# Patient Record
Sex: MALE | Race: WHITE | Employment: OTHER | URBAN - METROPOLITAN AREA
[De-identification: names, ages, dates, MRNs, and addresses within clinical notes are randomized per-mention and may not be internally consistent; named-entity substitution may affect disease eponyms.]

---

## 2022-08-27 ENCOUNTER — HOSPITAL ENCOUNTER (INPATIENT)
Age: 67
LOS: 4 days | Discharge: HOME | DRG: 180 | End: 2022-08-31
Attending: INTERNAL MEDICINE | Admitting: INTERNAL MEDICINE
Payer: MEDICARE

## 2022-08-27 ENCOUNTER — HOSPITAL ENCOUNTER (EMERGENCY)
Age: 67
Discharge: ANOTHER HOSPITAL | End: 2022-08-27
Attending: EMERGENCY MEDICINE
Payer: MEDICARE

## 2022-08-27 ENCOUNTER — APPOINTMENT (OUTPATIENT)
Dept: GENERAL RADIOLOGY | Age: 67
End: 2022-08-27
Payer: MEDICARE

## 2022-08-27 ENCOUNTER — APPOINTMENT (OUTPATIENT)
Dept: CT IMAGING | Age: 67
End: 2022-08-27
Payer: MEDICARE

## 2022-08-27 VITALS
WEIGHT: 230 LBS | HEART RATE: 92 BPM | BODY MASS INDEX: 30.48 KG/M2 | DIASTOLIC BLOOD PRESSURE: 93 MMHG | HEIGHT: 73 IN | RESPIRATION RATE: 32 BRPM | TEMPERATURE: 99.2 F | OXYGEN SATURATION: 95 % | SYSTOLIC BLOOD PRESSURE: 151 MMHG

## 2022-08-27 DIAGNOSIS — I71.43 ANEURYSM OF INFRARENAL ABDOMINAL AORTA: ICD-10-CM

## 2022-08-27 DIAGNOSIS — J90 LOCULATED PLEURAL EFFUSION: Primary | ICD-10-CM

## 2022-08-27 DIAGNOSIS — R07.81 PLEURITIC CHEST PAIN: ICD-10-CM

## 2022-08-27 DIAGNOSIS — J98.11 ATELECTASIS: ICD-10-CM

## 2022-08-27 DIAGNOSIS — J18.9 PNEUMONIA OF LEFT LOWER LOBE DUE TO INFECTIOUS ORGANISM: ICD-10-CM

## 2022-08-27 LAB
ALBUMIN SERPL-MCNC: 3.2 G/DL (ref 3.5–5.2)
ALP BLD-CCNC: 64 U/L (ref 40–129)
ALT SERPL-CCNC: 16 U/L (ref 0–40)
ANION GAP SERPL CALCULATED.3IONS-SCNC: 13 MMOL/L (ref 7–16)
APTT: 37.8 SEC (ref 24.5–35.1)
AST SERPL-CCNC: 18 U/L (ref 0–39)
BASOPHILS ABSOLUTE: 0.06 E9/L (ref 0–0.2)
BASOPHILS RELATIVE PERCENT: 0.4 % (ref 0–2)
BILIRUB SERPL-MCNC: 0.5 MG/DL (ref 0–1.2)
BUN BLDV-MCNC: 8 MG/DL (ref 6–23)
CALCIUM SERPL-MCNC: 9.3 MG/DL (ref 8.6–10.2)
CHLORIDE BLD-SCNC: 97 MMOL/L (ref 98–107)
CO2: 23 MMOL/L (ref 22–29)
CREAT SERPL-MCNC: 0.7 MG/DL (ref 0.7–1.2)
EKG ATRIAL RATE: 100 BPM
EKG P AXIS: 64 DEGREES
EKG P-R INTERVAL: 140 MS
EKG Q-T INTERVAL: 318 MS
EKG QRS DURATION: 82 MS
EKG QTC CALCULATION (BAZETT): 410 MS
EKG R AXIS: -21 DEGREES
EKG T AXIS: 76 DEGREES
EKG VENTRICULAR RATE: 100 BPM
EOSINOPHILS ABSOLUTE: 0.04 E9/L (ref 0.05–0.5)
EOSINOPHILS RELATIVE PERCENT: 0.3 % (ref 0–6)
GFR AFRICAN AMERICAN: >60
GFR NON-AFRICAN AMERICAN: >60 ML/MIN/1.73
GLUCOSE BLD-MCNC: 117 MG/DL (ref 74–99)
HCT VFR BLD CALC: 42.5 % (ref 37–54)
HEMOGLOBIN: 14.7 G/DL (ref 12.5–16.5)
IMMATURE GRANULOCYTES #: 0.11 E9/L
IMMATURE GRANULOCYTES %: 0.8 % (ref 0–5)
INR BLD: 1.2
LACTIC ACID, SEPSIS: 1.4 MMOL/L (ref 0.5–1.9)
LYMPHOCYTES ABSOLUTE: 1.32 E9/L (ref 1.5–4)
LYMPHOCYTES RELATIVE PERCENT: 9.7 % (ref 20–42)
MCH RBC QN AUTO: 30.2 PG (ref 26–35)
MCHC RBC AUTO-ENTMCNC: 34.6 % (ref 32–34.5)
MCV RBC AUTO: 87.4 FL (ref 80–99.9)
MONOCYTES ABSOLUTE: 1.22 E9/L (ref 0.1–0.95)
MONOCYTES RELATIVE PERCENT: 9 % (ref 2–12)
NEUTROPHILS ABSOLUTE: 10.85 E9/L (ref 1.8–7.3)
NEUTROPHILS RELATIVE PERCENT: 79.8 % (ref 43–80)
PDW BLD-RTO: 14.6 FL (ref 11.5–15)
PLATELET # BLD: 417 E9/L (ref 130–450)
PMV BLD AUTO: 9.5 FL (ref 7–12)
POTASSIUM REFLEX MAGNESIUM: 4 MMOL/L (ref 3.5–5)
PRO-BNP: 313 PG/ML (ref 0–125)
PROTHROMBIN TIME: 13.5 SEC (ref 9.3–12.4)
RBC # BLD: 4.86 E12/L (ref 3.8–5.8)
SARS-COV-2, NAAT: NOT DETECTED
SODIUM BLD-SCNC: 133 MMOL/L (ref 132–146)
TOTAL PROTEIN: 8 G/DL (ref 6.4–8.3)
TROPONIN, HIGH SENSITIVITY: 12 NG/L (ref 0–11)
TROPONIN, HIGH SENSITIVITY: 12 NG/L (ref 0–11)
WBC # BLD: 13.6 E9/L (ref 4.5–11.5)

## 2022-08-27 PROCEDURE — 84484 ASSAY OF TROPONIN QUANT: CPT

## 2022-08-27 PROCEDURE — 2580000003 HC RX 258: Performed by: STUDENT IN AN ORGANIZED HEALTH CARE EDUCATION/TRAINING PROGRAM

## 2022-08-27 PROCEDURE — 80053 COMPREHEN METABOLIC PANEL: CPT

## 2022-08-27 PROCEDURE — 85730 THROMBOPLASTIN TIME PARTIAL: CPT

## 2022-08-27 PROCEDURE — 96365 THER/PROPH/DIAG IV INF INIT: CPT

## 2022-08-27 PROCEDURE — 6360000002 HC RX W HCPCS: Performed by: FAMILY MEDICINE

## 2022-08-27 PROCEDURE — 96375 TX/PRO/DX INJ NEW DRUG ADDON: CPT

## 2022-08-27 PROCEDURE — 83605 ASSAY OF LACTIC ACID: CPT

## 2022-08-27 PROCEDURE — 2140000000 HC CCU INTERMEDIATE R&B

## 2022-08-27 PROCEDURE — 6360000002 HC RX W HCPCS: Performed by: EMERGENCY MEDICINE

## 2022-08-27 PROCEDURE — 6360000002 HC RX W HCPCS: Performed by: STUDENT IN AN ORGANIZED HEALTH CARE EDUCATION/TRAINING PROGRAM

## 2022-08-27 PROCEDURE — 6370000000 HC RX 637 (ALT 250 FOR IP): Performed by: FAMILY MEDICINE

## 2022-08-27 PROCEDURE — 36415 COLL VENOUS BLD VENIPUNCTURE: CPT

## 2022-08-27 PROCEDURE — 87635 SARS-COV-2 COVID-19 AMP PRB: CPT

## 2022-08-27 PROCEDURE — 2580000003 HC RX 258: Performed by: FAMILY MEDICINE

## 2022-08-27 PROCEDURE — 87040 BLOOD CULTURE FOR BACTERIA: CPT

## 2022-08-27 PROCEDURE — 96366 THER/PROPH/DIAG IV INF ADDON: CPT

## 2022-08-27 PROCEDURE — 2500000003 HC RX 250 WO HCPCS: Performed by: FAMILY MEDICINE

## 2022-08-27 PROCEDURE — 93005 ELECTROCARDIOGRAM TRACING: CPT | Performed by: STUDENT IN AN ORGANIZED HEALTH CARE EDUCATION/TRAINING PROGRAM

## 2022-08-27 PROCEDURE — 85025 COMPLETE CBC W/AUTO DIFF WBC: CPT

## 2022-08-27 PROCEDURE — 71045 X-RAY EXAM CHEST 1 VIEW: CPT

## 2022-08-27 PROCEDURE — 71275 CT ANGIOGRAPHY CHEST: CPT

## 2022-08-27 PROCEDURE — 96376 TX/PRO/DX INJ SAME DRUG ADON: CPT

## 2022-08-27 PROCEDURE — 96374 THER/PROPH/DIAG INJ IV PUSH: CPT

## 2022-08-27 PROCEDURE — 2580000003 HC RX 258: Performed by: EMERGENCY MEDICINE

## 2022-08-27 PROCEDURE — 99285 EMERGENCY DEPT VISIT HI MDM: CPT

## 2022-08-27 PROCEDURE — 6360000004 HC RX CONTRAST MEDICATION: Performed by: RADIOLOGY

## 2022-08-27 PROCEDURE — 74174 CTA ABD&PLVS W/CONTRAST: CPT

## 2022-08-27 PROCEDURE — 85610 PROTHROMBIN TIME: CPT

## 2022-08-27 PROCEDURE — 83880 ASSAY OF NATRIURETIC PEPTIDE: CPT

## 2022-08-27 PROCEDURE — 6370000000 HC RX 637 (ALT 250 FOR IP): Performed by: STUDENT IN AN ORGANIZED HEALTH CARE EDUCATION/TRAINING PROGRAM

## 2022-08-27 RX ORDER — ENOXAPARIN SODIUM 100 MG/ML
30 INJECTION SUBCUTANEOUS 2 TIMES DAILY
Status: DISCONTINUED | OUTPATIENT
Start: 2022-08-28 | End: 2022-08-31 | Stop reason: HOSPADM

## 2022-08-27 RX ORDER — NITROGLYCERIN 0.4 MG/1
0.4 TABLET SUBLINGUAL ONCE
Status: COMPLETED | OUTPATIENT
Start: 2022-08-27 | End: 2022-08-27

## 2022-08-27 RX ORDER — SODIUM CHLORIDE 9 MG/ML
INJECTION, SOLUTION INTRAVENOUS PRN
Status: DISCONTINUED | OUTPATIENT
Start: 2022-08-27 | End: 2022-08-31 | Stop reason: HOSPADM

## 2022-08-27 RX ORDER — SODIUM CHLORIDE 0.9 % (FLUSH) 0.9 %
10 SYRINGE (ML) INJECTION EVERY 12 HOURS SCHEDULED
Status: DISCONTINUED | OUTPATIENT
Start: 2022-08-27 | End: 2022-08-31 | Stop reason: HOSPADM

## 2022-08-27 RX ORDER — SODIUM CHLORIDE 9 MG/ML
INJECTION, SOLUTION INTRAVENOUS ONCE
Status: COMPLETED | OUTPATIENT
Start: 2022-08-27 | End: 2022-08-27

## 2022-08-27 RX ORDER — FENTANYL CITRATE 0.05 MG/ML
100 INJECTION, SOLUTION INTRAMUSCULAR; INTRAVENOUS ONCE
Status: COMPLETED | OUTPATIENT
Start: 2022-08-27 | End: 2022-08-27

## 2022-08-27 RX ORDER — FENTANYL CITRATE 50 UG/ML
50 INJECTION, SOLUTION INTRAMUSCULAR; INTRAVENOUS
Status: DISCONTINUED | OUTPATIENT
Start: 2022-08-27 | End: 2022-08-31 | Stop reason: HOSPADM

## 2022-08-27 RX ORDER — ASPIRIN 81 MG/1
324 TABLET, CHEWABLE ORAL ONCE
Status: COMPLETED | OUTPATIENT
Start: 2022-08-27 | End: 2022-08-27

## 2022-08-27 RX ORDER — AMLODIPINE BESYLATE 5 MG/1
5 TABLET ORAL DAILY
Status: DISCONTINUED | OUTPATIENT
Start: 2022-08-28 | End: 2022-08-31 | Stop reason: HOSPADM

## 2022-08-27 RX ORDER — ACETAMINOPHEN 325 MG/1
650 TABLET ORAL EVERY 6 HOURS PRN
Status: DISCONTINUED | OUTPATIENT
Start: 2022-08-27 | End: 2022-08-31 | Stop reason: HOSPADM

## 2022-08-27 RX ORDER — PROMETHAZINE HYDROCHLORIDE 12.5 MG/1
12.5 TABLET ORAL EVERY 6 HOURS PRN
Status: DISCONTINUED | OUTPATIENT
Start: 2022-08-27 | End: 2022-08-31 | Stop reason: HOSPADM

## 2022-08-27 RX ORDER — POLYETHYLENE GLYCOL 3350 17 G/17G
17 POWDER, FOR SOLUTION ORAL DAILY PRN
Status: DISCONTINUED | OUTPATIENT
Start: 2022-08-27 | End: 2022-08-31 | Stop reason: HOSPADM

## 2022-08-27 RX ORDER — ACETAMINOPHEN 650 MG/1
650 SUPPOSITORY RECTAL EVERY 6 HOURS PRN
Status: DISCONTINUED | OUTPATIENT
Start: 2022-08-27 | End: 2022-08-31 | Stop reason: HOSPADM

## 2022-08-27 RX ORDER — ATORVASTATIN CALCIUM 20 MG/1
20 TABLET, FILM COATED ORAL DAILY
Status: DISCONTINUED | OUTPATIENT
Start: 2022-08-28 | End: 2022-08-29

## 2022-08-27 RX ORDER — ONDANSETRON 2 MG/ML
4 INJECTION INTRAMUSCULAR; INTRAVENOUS EVERY 6 HOURS PRN
Status: DISCONTINUED | OUTPATIENT
Start: 2022-08-27 | End: 2022-08-31 | Stop reason: HOSPADM

## 2022-08-27 RX ORDER — SODIUM CHLORIDE 0.9 % (FLUSH) 0.9 %
10 SYRINGE (ML) INJECTION PRN
Status: DISCONTINUED | OUTPATIENT
Start: 2022-08-27 | End: 2022-08-31 | Stop reason: HOSPADM

## 2022-08-27 RX ORDER — OXYCODONE HYDROCHLORIDE AND ACETAMINOPHEN 5; 325 MG/1; MG/1
1 TABLET ORAL EVERY 6 HOURS PRN
Status: DISCONTINUED | OUTPATIENT
Start: 2022-08-27 | End: 2022-08-31 | Stop reason: HOSPADM

## 2022-08-27 RX ORDER — FENTANYL CITRATE 0.05 MG/ML
100 INJECTION, SOLUTION INTRAMUSCULAR; INTRAVENOUS
Status: DISCONTINUED | OUTPATIENT
Start: 2022-08-27 | End: 2022-08-27 | Stop reason: HOSPADM

## 2022-08-27 RX ORDER — FENTANYL CITRATE 0.05 MG/ML
50 INJECTION, SOLUTION INTRAMUSCULAR; INTRAVENOUS ONCE
Status: COMPLETED | OUTPATIENT
Start: 2022-08-27 | End: 2022-08-27

## 2022-08-27 RX ADMIN — FENTANYL CITRATE 100 MCG: 50 INJECTION INTRAMUSCULAR; INTRAVENOUS at 16:48

## 2022-08-27 RX ADMIN — FENTANYL CITRATE 100 MCG: 50 INJECTION INTRAMUSCULAR; INTRAVENOUS at 14:02

## 2022-08-27 RX ADMIN — FENTANYL CITRATE 50 MCG: 0.05 INJECTION, SOLUTION INTRAMUSCULAR; INTRAVENOUS at 21:39

## 2022-08-27 RX ADMIN — Medication 10 ML: at 21:41

## 2022-08-27 RX ADMIN — CEFTRIAXONE SODIUM 1000 MG: 1 INJECTION, POWDER, FOR SOLUTION INTRAMUSCULAR; INTRAVENOUS at 11:30

## 2022-08-27 RX ADMIN — AZITHROMYCIN MONOHYDRATE 500 MG: 500 INJECTION, POWDER, LYOPHILIZED, FOR SOLUTION INTRAVENOUS at 11:29

## 2022-08-27 RX ADMIN — FENTANYL CITRATE 100 MCG: 50 INJECTION INTRAMUSCULAR; INTRAVENOUS at 19:01

## 2022-08-27 RX ADMIN — OXYCODONE AND ACETAMINOPHEN 1 TABLET: 5; 325 TABLET ORAL at 22:37

## 2022-08-27 RX ADMIN — FENTANYL CITRATE 100 MCG: 50 INJECTION INTRAMUSCULAR; INTRAVENOUS at 17:59

## 2022-08-27 RX ADMIN — FENTANYL CITRATE 50 MCG: 50 INJECTION INTRAMUSCULAR; INTRAVENOUS at 08:40

## 2022-08-27 RX ADMIN — IOPAMIDOL 75 ML: 755 INJECTION, SOLUTION INTRAVENOUS at 10:04

## 2022-08-27 RX ADMIN — ASPIRIN 81 MG CHEWABLE TABLET 324 MG: 81 TABLET CHEWABLE at 08:22

## 2022-08-27 RX ADMIN — FENTANYL CITRATE 100 MCG: 50 INJECTION INTRAMUSCULAR; INTRAVENOUS at 19:51

## 2022-08-27 RX ADMIN — NITROGLYCERIN 0.4 MG: 0.4 TABLET SUBLINGUAL at 08:20

## 2022-08-27 RX ADMIN — SODIUM CHLORIDE: 9 INJECTION, SOLUTION INTRAVENOUS at 11:54

## 2022-08-27 RX ADMIN — FENTANYL CITRATE 100 MCG: 50 INJECTION INTRAMUSCULAR; INTRAVENOUS at 11:17

## 2022-08-27 RX ADMIN — DOXYCYCLINE 100 MG: 100 INJECTION, POWDER, LYOPHILIZED, FOR SOLUTION INTRAVENOUS at 21:41

## 2022-08-27 ASSESSMENT — PAIN SCALES - GENERAL
PAINLEVEL_OUTOF10: 8
PAINLEVEL_OUTOF10: 8
PAINLEVEL_OUTOF10: 9
PAINLEVEL_OUTOF10: 8
PAINLEVEL_OUTOF10: 8
PAINLEVEL_OUTOF10: 9
PAINLEVEL_OUTOF10: 10
PAINLEVEL_OUTOF10: 8
PAINLEVEL_OUTOF10: 8
PAINLEVEL_OUTOF10: 9
PAINLEVEL_OUTOF10: 9
PAINLEVEL_OUTOF10: 7
PAINLEVEL_OUTOF10: 10
PAINLEVEL_OUTOF10: 7
PAINLEVEL_OUTOF10: 10

## 2022-08-27 ASSESSMENT — PAIN DESCRIPTION - ONSET
ONSET: ON-GOING

## 2022-08-27 ASSESSMENT — PAIN DESCRIPTION - LOCATION
LOCATION: CHEST

## 2022-08-27 ASSESSMENT — PAIN DESCRIPTION - FREQUENCY
FREQUENCY: CONTINUOUS

## 2022-08-27 ASSESSMENT — ENCOUNTER SYMPTOMS
SHORTNESS OF BREATH: 0
ABDOMINAL PAIN: 0
SORE THROAT: 0
BACK PAIN: 0
VOMITING: 0
NAUSEA: 0
RHINORRHEA: 0
COUGH: 1
DIARRHEA: 0

## 2022-08-27 ASSESSMENT — PAIN DESCRIPTION - DESCRIPTORS
DESCRIPTORS: SHARP
DESCRIPTORS: SHARP
DESCRIPTORS: SHARP;ACHING;DISCOMFORT

## 2022-08-27 ASSESSMENT — PAIN DESCRIPTION - ORIENTATION
ORIENTATION: LEFT;ANTERIOR;MID
ORIENTATION: LEFT;MID;ANTERIOR
ORIENTATION: LEFT;ANTERIOR

## 2022-08-27 ASSESSMENT — PAIN DESCRIPTION - PAIN TYPE
TYPE: ACUTE PAIN

## 2022-08-27 ASSESSMENT — PAIN - FUNCTIONAL ASSESSMENT
PAIN_FUNCTIONAL_ASSESSMENT: 0-10
PAIN_FUNCTIONAL_ASSESSMENT: 0-10

## 2022-08-27 NOTE — ED PROVIDER NOTES
[address] [facility]   ED Provider Note  Department of Emergency Medicine     ED Room:       Written by: Ralph Horner DO  Patient Name: Jeronimo Uriarte  Attending Provider: Mateusz Montejo DO  Admit Date: 2022  7:35 AM  MRN: [id number]    : 1955        Chief Complaint   Patient presents with    Chest Pain     Chest pressure that started 2 days ago and today the pain is not going away. - Chief complaint    HPI   Jeronimo Uriarte is a 74 y.o. male presenting to the ED for evaluation of Chest Pain (Chest pressure that started 2 days ago and today the pain is not going away.)      History obtained from patient. Patient has a past medical history of HTN, HLD, history of intracranial hemorrhage in 2014; takes amlodipine. Patient is presenting for evaluation of chest pain that has been occurring episodically for the past 3 days but since this morning has been persistent; patient describes pain as middle and left side of his chest, feels like someone \"hit me with a sledgehammer\", worse when he breathes in deep. Symptoms are not affected by exertion or changes in position. Patient states he did take ibuprofen a few times over the past 2 days with improved symptoms but today he has not had any yet. States it is the most severe it has been today. Pain is not radiating. Denies any back pain. Denies any recent long travel; denies any lower extremity edema or tenderness, denies any history of DVT or PE. Patient does not take any blood thinning medications. Denies any history of ACS. Complaints are severe in severity and persistent. Does endorse cough, chronic but now coughing up green mucous. Denies SOB at rest but does report difficulty taking a deep breath due to pain. Review of Systems   Constitutional:  Negative for chills and fever. HENT:  Negative for rhinorrhea and sore throat. Eyes:  Negative for visual disturbance. Respiratory:  Positive for cough.  Negative for shortness of breath. Cardiovascular:  Positive for chest pain. Negative for palpitations and leg swelling. Gastrointestinal:  Negative for abdominal pain, diarrhea, nausea and vomiting. Genitourinary:  Negative for dysuria and frequency. Musculoskeletal:  Negative for back pain and myalgias. Skin:  Negative for rash and wound. Neurological:  Negative for weakness, numbness and headaches. Psychiatric/Behavioral:  Negative for confusion. All other systems reviewed and are negative. Physical Exam  Vitals and nursing note reviewed. Constitutional:       General: He is not in acute distress. Appearance: He is ill-appearing. He is not toxic-appearing. HENT:      Head: Normocephalic and atraumatic. Right Ear: External ear normal.      Left Ear: External ear normal.      Nose: Nose normal. No rhinorrhea. Mouth/Throat:      Mouth: Mucous membranes are moist.      Pharynx: Oropharynx is clear. Eyes:      Extraocular Movements: Extraocular movements intact. Conjunctiva/sclera: Conjunctivae normal.      Pupils: Pupils are equal, round, and reactive to light. Cardiovascular:      Rate and Rhythm: Normal rate and regular rhythm. Pulses: Normal pulses. Heart sounds: Normal heart sounds. Pulmonary:      Effort: Pulmonary effort is normal. No respiratory distress. Breath sounds: Rhonchi (b/l) present. No wheezing or rales. Comments: Intermittent cough    Abdominal:      General: Bowel sounds are normal.      Palpations: Abdomen is soft. Tenderness: There is no abdominal tenderness. There is no right CVA tenderness, left CVA tenderness, guarding or rebound. Musculoskeletal:         General: No tenderness. Normal range of motion. Cervical back: Normal range of motion and neck supple. Right lower leg: No edema. Left lower leg: No edema. Skin:     General: Skin is warm and dry. Capillary Refill: Capillary refill takes less than 2 seconds. Coloration: Skin is not jaundiced or pale. Findings: No rash. Neurological:      General: No focal deficit present. Mental Status: He is alert and oriented to person, place, and time. Sensory: No sensory deficit. Motor: No weakness. Gait: Gait normal.   Psychiatric:         Mood and Affect: Mood normal.         Behavior: Behavior normal.        Procedures       [facility]       ED Course as of 08/27/22 1634   Sat Aug 27, 2022   0838 Sublingual nitro did not change symptoms at all. Patient still having significant pain  50 mcg IV fentanyl ordered [VG]   0928 Pt having improved pain with certain positions, he leans forward which then gives him relief when sitting back up.   [SO]   1124 Given patient's incidentally identified 5.5 cm abdominal aortic aneurysm, and that he requires admission for evaluation with pulmonology and likely bronchoscopy, we will initiate transfer to [facility] given the availability of vascular surgery at that facility as well. [VG]   1131 Spoke with Dr. Neville Jaquez at [facility] via access center, discussed case, patient is accepted for transfer and admission at [facility]. [VG]      ED Course User Index  [SO] Francine Francois,   [VG] Giana Celestin DO         Medical Decision Making: This is a 74 y.o. male presenting for evaluation of chest pain x3 days, on the left, worse when he breathes in. Alert and oriented on arrival, ill-appearing and uncomfortable due to chest pain. EKG did not show acute ischemic changes. Patient given 1 dose of sublingual nitro without improvement. Subsequently given 1 dose of IV fentanyl with significant improvement. Ultimately, it was found that patient had left-sided infiltrate and effusion; we obtained a CTA which did show large pleural effusion on the left with some infiltrate as well. He was treated with azithromycin and Rocephin. Patient symptoms improved throughout this encounter.   Given that he was reporting initiate transfer to [facility] given the availability of vascular surgery at that facility as well. [VG]   [phone] with Dr. Slim Brownlee at [facility] via access center, discussed case, patient is accepted for transfer and admission at [facility]. [VG]      ED Course User Index  [SO] Alonso Martines DO  [VG] Bryan Crane,        --------------------------------------------- PAST HISTORY ---------------------------------------------  Past Medical History:  has a past medical history of Hepatitis C, Hyperlipidemia, Hypertension, and Intracranial hemorrhage ([facility] [address] Past Surgical History:  has a past surgical history that includes back surgery. Social History:  reports that he has been smoking cigarettes. He has a 22.50 pack-year smoking history. He has never used smokeless tobacco. He reports current alcohol use of about 8.3 standard drinks per week. He reports that he does not use drugs. Family History: family history is not on file. Unless otherwise noted, family history is non contributory. The patients home medications have been reviewed. Allergies: Patient has no known allergies.     -------------------------------------------------- RESULTS -------------------------------------------------    Lab  Results for orders placed or performed during the hospital encounter of 08/27/22   COVID-19, Rapid    Specimen: Nasopharyngeal Swab   Result Value Ref Range    SARS-CoV-2, NAAT Not Detected Not Detected   CBC with Auto Differential   Result Value Ref Range    WBC 13.6 (H) 4.5 - 11.5 E9/L    RBC 4.86 3.80 - 5.80 E12/L    Hemoglobin 14.7 12.5 - 16.5 g/dL    Hematocrit 42.5 37.0 - 54.0 %    MCV 87.4 80.0 - 99.9 fL    MCH 30.2 26.0 - 35.0 pg    MCHC 34.6 (H) 32.0 - 34.5 %    RDW 14.6 11.5 - 15.0 fL    Platelets 553 295 - 716 E9/L    MPV 9.5 7.0 - 12.0 fL    Neutrophils % 79.8 43.0 - 80.0 %    Immature Granulocytes % 0.8 0.0 - 5.0 %    Lymphocytes % 9.7 (L) 20.0 - 42.0 %    Monocytes % 9.0 2.0 - 12.0 %    Eosinophils % 0.3 0.0 - 6.0 %    Basophils % 0.4 0.0 - 2.0 %    Neutrophils Absolute 10.85 (H) 1.80 - 7.30 E9/L    Immature Granulocytes # 0.11 E9/L    Lymphocytes Absolute 1.32 (L) 1.50 - 4.00 E9/L    Monocytes Absolute 1.22 (H) 0.10 - 0.95 E9/L    Eosinophils Absolute 0.04 (L) 0.05 - 0.50 E9/L    Basophils Absolute 0.06 0.00 - 0.20 E9/L   Comprehensive Metabolic Panel w/ Reflex to MG   Result Value Ref Range    Sodium 133 132 - 146 mmol/L    Potassium reflex Magnesium 4.0 3.5 - 5.0 mmol/L    Chloride 97 (L) 98 - 107 mmol/L    CO2 23 22 - 29 mmol/L    Anion Gap 13 7 - 16 mmol/L    Glucose 117 (H) 74 - 99 mg/dL    BUN 8 6 - 23 mg/dL    Creatinine 0.7 0.7 - 1.2 mg/dL    GFR Non-African American >60 >=60 mL/min/1.73    GFR African American >60     Calcium 9.3 8.6 - 10.2 mg/dL    Total Protein 8.0 6.4 - 8.3 g/dL    Albumin 3.2 (L) 3.5 - 5.2 g/dL    Total Bilirubin 0.5 0.0 - 1.2 mg/dL    Alkaline Phosphatase 64 40 - 129 U/L    ALT 16 0 - 40 U/L    AST 18 0 - 39 U/L   Troponin   Result Value Ref Range    Troponin, High Sensitivity 12 (H) 0 - 11 ng/L   Brain Natriuretic Peptide   Result Value Ref Range    Pro- (H) 0 - 125 pg/mL   Protime-INR   Result Value Ref Range    Protime 13.5 (H) 9.3 - 12.4 sec    INR 1.2    APTT   Result Value Ref Range    aPTT 37.8 (H) 24.5 - 35.1 sec   Troponin   Result Value Ref Range    Troponin, High Sensitivity 12 (H) 0 - 11 ng/L   Lactate, Sepsis   Result Value Ref Range    Lactic Acid, Sepsis 1.4 0.5 - 1.9 mmol/L   EKG 12 Lead   Result Value Ref Range    Ventricular Rate 100 BPM    Atrial Rate 100 BPM    P-R Interval 140 ms    QRS Duration 82 ms    Q-T Interval 318 ms    QTc Calculation (Bazett) 410 ms    P Axis 64 degrees    R Axis -21 degrees    T Axis 76 degrees       Radiology  CTA CHEST W CONTRAST   Final Result   Collapse of most of the left upper lobe radiating to the left hilum. There   are mediastinal lymph nodes. Central left hilar tumor is not excluded. Recommend bronchoscopy. Loculated left pleural effusion and significant infiltrate in the left lower   lobe. Mild fatty infiltration in the liver. Infrarenal abdominal aortic aneurysm measuring 5.5 cm. Surgical consultation   is recommended. This is new since the prior study      Prostatic enlargement etiology indeterminate. CTA ABDOMEN PELVIS W CONTRAST   Final Result   Collapse of most of the left upper lobe radiating to the left hilum. There   are mediastinal lymph nodes. Central left hilar tumor is not excluded. Recommend bronchoscopy. Loculated left pleural effusion and significant infiltrate in the left lower   lobe. Mild fatty infiltration in the liver. Infrarenal abdominal aortic aneurysm measuring 5.5 cm. Surgical consultation   is recommended. This is new since the prior study      Prostatic enlargement etiology indeterminate. XR CHEST PORTABLE   Final Result   Significant size loculated left effusion is suspected. There is some areas   of scarring/atelectasis. Recommend follow-up chest CT post contrast.  Other   etiologies are not excluded             Interpreted by the radiologist unless otherwise specified.    ------------------------- NURSING NOTES AND VITALS REVIEWED ---------------------------  Date / Time Roomed:  8/27/2022  7:35 AM  ED Bed Assignment:  08/08    The nursing notes within the ED encounter and vital signs as below have been reviewed by myself.    Patient Vitals for the past 24 hrs:   BP Temp Temp src Pulse Resp SpO2 Height Weight   08/27/22 1333 (!) 136/100 -- -- -- -- -- -- --   08/27/22 1329 (!) 109/94 -- -- 94 (!) 31 96 % -- --   08/27/22 1213 (!) 147/86 -- -- 91 20 96 % 6' 1\" (1.854 m) 230 lb (104.3 kg)   08/27/22 0836 (!) 138/91 -- -- (!) 101 26 95 % -- --   08/27/22 0821 (!) 154/85 -- -- -- -- -- -- --   08/27/22 0737 (!) 157/94 -- -- -- -- -- -- 210 lb (95.3 kg)   08/27/22 0725 -- 97.4 °F (36.3 °C) Oral 93 24 98 % -- -- Oxygen Saturation Interpretation: Normal    The patients available past medical records and past encounters were reviewed. ------------------------------------------ PROGRESS NOTES ------------------------------------------  Re-evaluation(s):  Please see ED course    I have spoken with the patient and discussed todays results, in addition to providing specific details for the plan of care and counseling regarding the diagnosis and prognosis. Their questions are answered at this time and they are agreeable with the plan. I have discussed the risks and benefits of transfer and they wish to proceed with the transfer. --------------------------------- ADDITIONAL PROVIDER NOTES ---------------------------------  Consultations:  Please see ED course    Reason for transfer: Loculated pleural effusion will need bronchoscopy, incidentally identified AAA infrarenal 5.5 cm, will need surgical consult; services not available at this facility. This patient's ED course included: a personal history and physicial examination, re-evaluation prior to disposition, multiple bedside re-evaluations, IV medications, cardiac monitoring, continuous pulse oximetry, and complex medical decision making and emergency management    This patient has remained hemodynamically stable during their ED course. Please note that the withdrawal or failure to initiate urgent interventions for this patient would likely result in a life threatening deterioration or permanent disability. Accordingly this patient received 35 minutes of critical care time, excluding separately billable procedures. Clinical Impression  1. Loculated pleural effusion    2. Pleuritic chest pain    3. Pneumonia of left lower lobe due to infectious organism    4. Aneurysm of infrarenal abdominal aorta (HCC)          Disposition  Patient's disposition: Transfer to [facility]. Transferred by: ALS. Patient's condition is serious. Roland Welch D.O. PGY-3     Resident Physician     Emergency Medicine      8/27/2022 7:59 AM      NOTE: This report was transcribed using voice recognition software.  Every effort was made to ensure accuracy; however, inadvertent computerized transcription errors may be present             Roland Welch DO  Resident  08/27/22 152

## 2022-08-28 PROBLEM — I10 PRIMARY HYPERTENSION: Status: ACTIVE | Noted: 2022-08-28

## 2022-08-28 PROBLEM — Z01.810 ENCOUNTER FOR PREOPERATIVE ASSESSMENT FOR NONCORONARY CARDIAC SURGERY: Status: ACTIVE | Noted: 2022-08-28

## 2022-08-28 PROBLEM — I71.40 ABDOMINAL AORTIC ANEURYSM (AAA) WITHOUT RUPTURE (HCC): Status: ACTIVE | Noted: 2022-08-28

## 2022-08-28 LAB
ALBUMIN SERPL-MCNC: 3.1 G/DL (ref 3.5–5.2)
ALP BLD-CCNC: 70 U/L (ref 40–129)
ALT SERPL-CCNC: 12 U/L (ref 0–40)
ANION GAP SERPL CALCULATED.3IONS-SCNC: 11 MMOL/L (ref 7–16)
ANION GAP SERPL CALCULATED.3IONS-SCNC: 13 MMOL/L (ref 7–16)
ANION GAP SERPL CALCULATED.3IONS-SCNC: 14 MMOL/L (ref 7–16)
AST SERPL-CCNC: 12 U/L (ref 0–39)
BASOPHILS ABSOLUTE: 0 E9/L (ref 0–0.2)
BASOPHILS RELATIVE PERCENT: 0.5 % (ref 0–2)
BILIRUB SERPL-MCNC: 0.4 MG/DL (ref 0–1.2)
BUN BLDV-MCNC: 9 MG/DL (ref 6–23)
CALCIUM SERPL-MCNC: 9 MG/DL (ref 8.6–10.2)
CALCIUM SERPL-MCNC: 9.1 MG/DL (ref 8.6–10.2)
CALCIUM SERPL-MCNC: 9.2 MG/DL (ref 8.6–10.2)
CHLORIDE BLD-SCNC: 92 MMOL/L (ref 98–107)
CHLORIDE BLD-SCNC: 93 MMOL/L (ref 98–107)
CHLORIDE BLD-SCNC: 95 MMOL/L (ref 98–107)
CHLORIDE URINE RANDOM: 21 MMOL/L
CO2: 21 MMOL/L (ref 22–29)
CO2: 21 MMOL/L (ref 22–29)
CO2: 22 MMOL/L (ref 22–29)
CREAT SERPL-MCNC: 0.7 MG/DL (ref 0.7–1.2)
CREATININE URINE: 181 MG/DL (ref 40–278)
EOSINOPHILS ABSOLUTE: 0 E9/L (ref 0.05–0.5)
EOSINOPHILS RELATIVE PERCENT: 0.3 % (ref 0–6)
GFR AFRICAN AMERICAN: >60
GFR NON-AFRICAN AMERICAN: >60 ML/MIN/1.73
GLUCOSE BLD-MCNC: 103 MG/DL (ref 74–99)
GLUCOSE BLD-MCNC: 113 MG/DL (ref 74–99)
GLUCOSE BLD-MCNC: 130 MG/DL (ref 74–99)
HCT VFR BLD CALC: 38.7 % (ref 37–54)
HEMOGLOBIN: 13.1 G/DL (ref 12.5–16.5)
L. PNEUMOPHILA SEROGP 1 UR AG: NORMAL
LACTIC ACID: 0.9 MMOL/L (ref 0.5–2.2)
LYMPHOCYTES ABSOLUTE: 1.29 E9/L (ref 1.5–4)
LYMPHOCYTES RELATIVE PERCENT: 9.5 % (ref 20–42)
MCH RBC QN AUTO: 29.2 PG (ref 26–35)
MCHC RBC AUTO-ENTMCNC: 33.9 % (ref 32–34.5)
MCV RBC AUTO: 86.2 FL (ref 80–99.9)
METAMYELOCYTES RELATIVE PERCENT: 0.9 % (ref 0–1)
MONOCYTES ABSOLUTE: 1.55 E9/L (ref 0.1–0.95)
MONOCYTES RELATIVE PERCENT: 12.2 % (ref 2–12)
NEUTROPHILS ABSOLUTE: 10.06 E9/L (ref 1.8–7.3)
NEUTROPHILS RELATIVE PERCENT: 77.4 % (ref 43–80)
OSMOLALITY URINE: 456 MOSM/KG (ref 300–900)
OSMOLALITY: 265 MOSM/KG (ref 285–310)
OSMOLALITY: 266 MOSM/KG (ref 285–310)
PDW BLD-RTO: 14.1 FL (ref 11.5–15)
PLATELET # BLD: 437 E9/L (ref 130–450)
PMV BLD AUTO: 9 FL (ref 7–12)
POLYCHROMASIA: ABNORMAL
POTASSIUM REFLEX MAGNESIUM: 4.1 MMOL/L (ref 3.5–5)
POTASSIUM SERPL-SCNC: 4.4 MMOL/L (ref 3.5–5)
POTASSIUM SERPL-SCNC: 4.5 MMOL/L (ref 3.5–5)
POTASSIUM, UR: 26.9 MMOL/L
PROCALCITONIN: 1.46 NG/ML (ref 0–0.08)
PROTEIN PROTEIN: 43 MG/DL (ref 0–12)
PROTEIN/CREAT RATIO: 0.2
PROTEIN/CREAT RATIO: 0.2 (ref 0–0.2)
RBC # BLD: 4.49 E12/L (ref 3.8–5.8)
SODIUM BLD-SCNC: 127 MMOL/L (ref 132–146)
SODIUM BLD-SCNC: 127 MMOL/L (ref 132–146)
SODIUM BLD-SCNC: 128 MMOL/L (ref 132–146)
SODIUM URINE: <20 MMOL/L
STREP PNEUMONIAE ANTIGEN, URINE: NORMAL
TOTAL PROTEIN: 7.4 G/DL (ref 6.4–8.3)
TSH SERPL DL<=0.05 MIU/L-ACNC: 4.52 UIU/ML (ref 0.27–4.2)
URIC ACID, SERUM: 3.8 MG/DL (ref 3.4–7)
WBC # BLD: 12.9 E9/L (ref 4.5–11.5)

## 2022-08-28 PROCEDURE — 6370000000 HC RX 637 (ALT 250 FOR IP): Performed by: FAMILY MEDICINE

## 2022-08-28 PROCEDURE — 84443 ASSAY THYROID STIM HORMONE: CPT

## 2022-08-28 PROCEDURE — 83605 ASSAY OF LACTIC ACID: CPT

## 2022-08-28 PROCEDURE — 2580000003 HC RX 258: Performed by: FAMILY MEDICINE

## 2022-08-28 PROCEDURE — 80048 BASIC METABOLIC PNL TOTAL CA: CPT

## 2022-08-28 PROCEDURE — 99222 1ST HOSP IP/OBS MODERATE 55: CPT | Performed by: INTERNAL MEDICINE

## 2022-08-28 PROCEDURE — 82436 ASSAY OF URINE CHLORIDE: CPT

## 2022-08-28 PROCEDURE — 84300 ASSAY OF URINE SODIUM: CPT

## 2022-08-28 PROCEDURE — 83935 ASSAY OF URINE OSMOLALITY: CPT

## 2022-08-28 PROCEDURE — 85025 COMPLETE CBC W/AUTO DIFF WBC: CPT

## 2022-08-28 PROCEDURE — 87206 SMEAR FLUORESCENT/ACID STAI: CPT

## 2022-08-28 PROCEDURE — 84133 ASSAY OF URINE POTASSIUM: CPT

## 2022-08-28 PROCEDURE — 87449 NOS EACH ORGANISM AG IA: CPT

## 2022-08-28 PROCEDURE — 84550 ASSAY OF BLOOD/URIC ACID: CPT

## 2022-08-28 PROCEDURE — 6360000002 HC RX W HCPCS: Performed by: FAMILY MEDICINE

## 2022-08-28 PROCEDURE — 87070 CULTURE OTHR SPECIMN AEROBIC: CPT

## 2022-08-28 PROCEDURE — 84156 ASSAY OF PROTEIN URINE: CPT

## 2022-08-28 PROCEDURE — APPSS60 APP SPLIT SHARED TIME 46-60 MINUTES: Performed by: NURSE PRACTITIONER

## 2022-08-28 PROCEDURE — 99223 1ST HOSP IP/OBS HIGH 75: CPT | Performed by: SURGERY

## 2022-08-28 PROCEDURE — 2580000003 HC RX 258: Performed by: INTERNAL MEDICINE

## 2022-08-28 PROCEDURE — 83930 ASSAY OF BLOOD OSMOLALITY: CPT

## 2022-08-28 PROCEDURE — 2140000000 HC CCU INTERMEDIATE R&B

## 2022-08-28 PROCEDURE — 80053 COMPREHEN METABOLIC PANEL: CPT

## 2022-08-28 PROCEDURE — 82570 ASSAY OF URINE CREATININE: CPT

## 2022-08-28 PROCEDURE — 84145 PROCALCITONIN (PCT): CPT

## 2022-08-28 PROCEDURE — 2500000003 HC RX 250 WO HCPCS: Performed by: FAMILY MEDICINE

## 2022-08-28 PROCEDURE — 36415 COLL VENOUS BLD VENIPUNCTURE: CPT

## 2022-08-28 RX ORDER — SODIUM CHLORIDE 9 MG/ML
INJECTION, SOLUTION INTRAVENOUS CONTINUOUS
Status: DISCONTINUED | OUTPATIENT
Start: 2022-08-28 | End: 2022-08-30

## 2022-08-28 RX ADMIN — CEFTRIAXONE 1000 MG: 1 INJECTION, POWDER, FOR SOLUTION INTRAMUSCULAR; INTRAVENOUS at 13:17

## 2022-08-28 RX ADMIN — ENOXAPARIN SODIUM 30 MG: 100 INJECTION SUBCUTANEOUS at 10:34

## 2022-08-28 RX ADMIN — OXYCODONE AND ACETAMINOPHEN 1 TABLET: 5; 325 TABLET ORAL at 04:40

## 2022-08-28 RX ADMIN — Medication 10 ML: at 10:35

## 2022-08-28 RX ADMIN — OXYCODONE AND ACETAMINOPHEN 1 TABLET: 5; 325 TABLET ORAL at 23:46

## 2022-08-28 RX ADMIN — DOXYCYCLINE 100 MG: 100 INJECTION, POWDER, LYOPHILIZED, FOR SOLUTION INTRAVENOUS at 21:39

## 2022-08-28 RX ADMIN — FENTANYL CITRATE 50 MCG: 0.05 INJECTION, SOLUTION INTRAMUSCULAR; INTRAVENOUS at 16:09

## 2022-08-28 RX ADMIN — Medication 10 ML: at 21:38

## 2022-08-28 RX ADMIN — OXYCODONE AND ACETAMINOPHEN 1 TABLET: 5; 325 TABLET ORAL at 10:33

## 2022-08-28 RX ADMIN — ATORVASTATIN CALCIUM 20 MG: 20 TABLET, FILM COATED ORAL at 10:34

## 2022-08-28 RX ADMIN — FENTANYL CITRATE 50 MCG: 0.05 INJECTION, SOLUTION INTRAMUSCULAR; INTRAVENOUS at 02:55

## 2022-08-28 RX ADMIN — DOXYCYCLINE 100 MG: 100 INJECTION, POWDER, LYOPHILIZED, FOR SOLUTION INTRAVENOUS at 10:36

## 2022-08-28 RX ADMIN — OXYCODONE AND ACETAMINOPHEN 1 TABLET: 5; 325 TABLET ORAL at 17:05

## 2022-08-28 RX ADMIN — SODIUM CHLORIDE: 9 INJECTION, SOLUTION INTRAVENOUS at 16:11

## 2022-08-28 RX ADMIN — AMLODIPINE BESYLATE 5 MG: 5 TABLET ORAL at 10:34

## 2022-08-28 RX ADMIN — ENOXAPARIN SODIUM 30 MG: 100 INJECTION SUBCUTANEOUS at 21:38

## 2022-08-28 ASSESSMENT — PAIN DESCRIPTION - PAIN TYPE
TYPE: ACUTE PAIN

## 2022-08-28 ASSESSMENT — PAIN SCALES - GENERAL
PAINLEVEL_OUTOF10: 1
PAINLEVEL_OUTOF10: 7
PAINLEVEL_OUTOF10: 6
PAINLEVEL_OUTOF10: 7
PAINLEVEL_OUTOF10: 6

## 2022-08-28 ASSESSMENT — PAIN DESCRIPTION - FREQUENCY
FREQUENCY: CONTINUOUS

## 2022-08-28 ASSESSMENT — PAIN DESCRIPTION - DESCRIPTORS
DESCRIPTORS: SHARP;TIGHTNESS;STABBING
DESCRIPTORS: SHARP
DESCRIPTORS: ACHING;DISCOMFORT
DESCRIPTORS: SHARP

## 2022-08-28 ASSESSMENT — PAIN DESCRIPTION - ONSET
ONSET: ON-GOING

## 2022-08-28 ASSESSMENT — PAIN DESCRIPTION - ORIENTATION
ORIENTATION: LEFT;ANTERIOR;MID
ORIENTATION: LEFT;ANTERIOR;MID
ORIENTATION: RIGHT
ORIENTATION: LEFT

## 2022-08-28 ASSESSMENT — PAIN DESCRIPTION - LOCATION
LOCATION: CHEST
LOCATION: ABDOMEN
LOCATION: CHEST

## 2022-08-28 NOTE — CONSULTS
VASCULAR SURGERY CONSULT NOTE    Reason for Consult:  AAA 5.5cm    HPI:    Karen Ge is a 74 y.o. male who is admitted to the hospital on 8/27 after transfer from [facility] presented with chest pain ongoing for 3 days. Worse with inspiration, and also reported severe tearing chest pain. .  Work-up showed patient had left-sided infiltrate and effusion, left upper lobe collapse and large loculated pleural effusion. Started on azithromycin, Rocephin. CTA revealed no evidence of dissection but showed incidentally identified 5.5 cm infrarenal AAA. Pt complained of LUQ pain and left sided neck pain for 3-4 days, intermittently, without any precipitating factors. Took a few advil that helped alleviate the pain. Denies any. Pt denies and CP. Denies N/V or changes in bowel habits. Has to sit upright to sleep because of bad LUQ pain w coughing or laying back. Denies any hx of claudication or exertional pain/symptoms, vision loss, headaches or lightheadedness. Never seen vascular surgery or cardiology before. [facility] Vascular surgery is consulted for evaluation and treatment of patient's incidentally found 5.5cm infrarenal AAA. PMH: htn  PSH: EGD and Colonoscopy 3mo ago, normal. Several excisions on face and ears for basal cell. No other surgeries.    SocHx: 1/2 ppd 15+ years, social etoh beer only, no drugs        ROS: Negative if blank [], Positive if [x]  General Vascular   [] Fevers [] Claudication (Blocks)   [] Chills [] Rest Pain   [] Weight Loss [] Tissue Loss   [] Chest Pain [] Clotting Disorder   [x] SOB at rest [x] Leg Swelling   [x] SOB with exertion [] DVT/PE      [] Nausea    [] Vomiting [] Stroke/TIA   [] Abdominal Pain [] Focal weakness   [] Melena [] Slurred Speech   [] Hematochezia [] Vision Changes   [] Hematuria    [] Dysuria [] Hx of Central Catheters   [] Wears Glasses/Contacts [] Dialysis and If so date initiated   [] Blindness    [] Right Hand Dominant   [] Difficulty BMI 30.34 kg/m²   CONSTITUTIONAL:  awake, alert, cooperative, no apparent distress, and appears stated age  NEURO:  Normal  EYES:  lids and lashes normal, sclera clear and conjunctiva normal  ENT:  normocepalic, without obvious abnormality  NECK:  supple, symmetrical, trachea midline, no jugular venous distension, carotid pulse 2+  LUNGS:  moderately increased work of breathing  CARDIOVASCULAR:  regular rate and rhythm  ABDOMEN:  firm, protuberant, tenderness noted in the left upper quadrant, Aorta is not palpable  SKIN:  normal skin color, texture, turgor    EXTREMITIES:    R UE Swelling absent   Incisions absent         5/5 Strength, Intact Sensation    L UE Swelling absent   Incisions absent         5/5 Strength, Intact Sensation    R LE Edema present     Incisions absent    Varicose veins present med malleolus   Wounds absent    Normal Capillary Refill   5/5 Strength, Intact Sensation    L LE Edema present     Incisions absent    Varicose veins present med malleolus   Wounds absent   Normal Capillary Refill   5/5 Strength, Intact Sensation    R brachial 2 L brachial 2   R radial 2 L radial 2   R posterior tibial 0 L posterior tibial 0   R dorsalis pedis 0 L dorsalis pedis 0       LABS:    Lab Results   Component Value Date    WBC 12.9 (H) 08/28/2022    HGB 13.1 08/28/2022    HCT 38.7 08/28/2022     08/28/2022    PROTIME 13.5 (H) 08/27/2022    INR 1.2 08/27/2022    K 4.1 08/28/2022    BUN 9 08/28/2022    CREATININE 0.7 08/28/2022       RADIOLOGY:  XR CHEST PORTABLE    Result Date: 8/27/2022  EXAMINATION: ONE XRAY VIEW OF THE CHEST 8/27/2022 8:27 am COMPARISON: 11/23/2014 HISTORY: ORDERING SYSTEM PROVIDED HISTORY: chest pain TECHNOLOGIST PROVIDED HISTORY: Reason for exam:->chest pain FINDINGS: There is some scarring/atelectasis in the left hemithorax and significant sized loculated effusion. Right lung is clear. Heart size is normal.     Significant size loculated left effusion is suspected.   There is some areas of scarring/atelectasis. Recommend follow-up chest CT post contrast.  Other etiologies are not excluded     CTA CHEST W CONTRAST    Result Date: 8/27/2022  EXAMINATION: CTA OF THE CHEST; CTA OF THE ABDOMEN AND PELVIS WITH CONTRAST 8/27/2022 9:55 am TECHNIQUE: CTA of the chest was performed after the administration of intravenous contrast.  Multiplanar reformatted images are provided for review. MIP images are provided for review. Automated exposure control, iterative reconstruction, and/or weight based adjustment of the mA/kV was utilized to reduce the radiation dose to as low as reasonably achievable.; CTA of the abdomen and pelvis was performed with the administration of intravenous contrast. Multiplanar reformatted images are provided for review. MIP images are provided for review. Automated exposure control, iterative reconstruction, and/or weight based adjustment of the mA/kV was utilized to reduce the radiation dose to as low as reasonably achievable. COMPARISON: 11/23/2014 HISTORY: ORDERING SYSTEM PROVIDED HISTORY: chest pain; eval dissection TECHNOLOGIST PROVIDED HISTORY: Reason for exam:->chest pain; eval dissection Decision Support Exception - unselect if not a suspected or confirmed emergency medical condition->Emergency Medical Condition (MA) FINDINGS: Pulmonary Arteries: Pulmonary arteries are suboptimally opacified for evaluation. No obvious evidence of intraluminal filling defect to suggest pulmonary embolism. Main pulmonary artery is normal in caliber. Mediastinum: There are superior mediastinal lymph nodes on the left. Largest measures 2 cm. .  The heart and pericardium demonstrate no acute abnormality. There is no acute abnormality of the thoracic aorta. Some low-attenuation areas in the thyroid gland are not felt to be significant. There is mild coronary arterial vascular plaque. There is some plaque in the descending thoracic aorta.   No evidence of thoracic aortic dissection or aneurysm. Lungs/pleura: There is collapse of much of the left upper lobe. This radiates to the left hilar region. .  There is a moderate size left pleural effusion with loculated components. There is interstitial and parenchymal infiltrate in the left lower lobe. Abdomen and pelvis: There is fatty infiltration in the liver. Spleen and pancreas are normal.  Kidneys are unremarkable. Origin of the celiac axis and SMA and single renal artery to each kidney is unremarkable. There is an infrarenal abdominal aortic aneurysm measuring 5.5 cm. This tapers at the bifurcation. There is mild arterial megaly of the common iliac arteries bilaterally. There is borderline wall prominence of the bladder. There is prostatic enlargement etiology indeterminate. No abdominal or pelvic lymphadenopathy or fluid collections are noted. Bowel loops are unremarkable. There are mild degenerative changes in the thoracic and lumbar spine. Soft Tissues/Bones: No acute bone or soft tissue abnormality. Collapse of most of the left upper lobe radiating to the left hilum. There are mediastinal lymph nodes. Central left hilar tumor is not excluded. Recommend bronchoscopy. Loculated left pleural effusion and significant infiltrate in the left lower lobe. Mild fatty infiltration in the liver. Infrarenal abdominal aortic aneurysm measuring 5.5 cm. Surgical consultation is recommended. This is new since the prior study Prostatic enlargement etiology indeterminate. CTA ABDOMEN PELVIS W CONTRAST    Result Date: 8/27/2022  EXAMINATION: CTA OF THE CHEST; CTA OF THE ABDOMEN AND PELVIS WITH CONTRAST 8/27/2022 9:55 am TECHNIQUE: CTA of the chest was performed after the administration of intravenous contrast.  Multiplanar reformatted images are provided for review. MIP images are provided for review.  Automated exposure control, iterative reconstruction, and/or weight based adjustment of the mA/kV was utilized to reduce the radiation dose to as low as reasonably achievable.; CTA of the abdomen and pelvis was performed with the administration of intravenous contrast. Multiplanar reformatted images are provided for review. MIP images are provided for review. Automated exposure control, iterative reconstruction, and/or weight based adjustment of the mA/kV was utilized to reduce the radiation dose to as low as reasonably achievable. COMPARISON: 11/23/2014 HISTORY: ORDERING SYSTEM PROVIDED HISTORY: chest pain; eval dissection TECHNOLOGIST PROVIDED HISTORY: Reason for exam:->chest pain; eval dissection Decision Support Exception - unselect if not a suspected or confirmed emergency medical condition->Emergency Medical Condition (MA) FINDINGS: Pulmonary Arteries: Pulmonary arteries are suboptimally opacified for evaluation. No obvious evidence of intraluminal filling defect to suggest pulmonary embolism. Main pulmonary artery is normal in caliber. Mediastinum: There are superior mediastinal lymph nodes on the left. Largest measures 2 cm. .  The heart and pericardium demonstrate no acute abnormality. There is no acute abnormality of the thoracic aorta. Some low-attenuation areas in the thyroid gland are not felt to be significant. There is mild coronary arterial vascular plaque. There is some plaque in the descending thoracic aorta. No evidence of thoracic aortic dissection or aneurysm. Lungs/pleura: There is collapse of much of the left upper lobe. This radiates to the left hilar region. .  There is a moderate size left pleural effusion with loculated components. There is interstitial and parenchymal infiltrate in the left lower lobe. Abdomen and pelvis: There is fatty infiltration in the liver. Spleen and pancreas are normal.  Kidneys are unremarkable. Origin of the celiac axis and SMA and single renal artery to each kidney is unremarkable. There is an infrarenal abdominal aortic aneurysm measuring 5.5 cm. This tapers at the bifurcation.   There is mild arterial megaly of the common iliac arteries bilaterally. There is borderline wall prominence of the bladder. There is prostatic enlargement etiology indeterminate. No abdominal or pelvic lymphadenopathy or fluid collections are noted. Bowel loops are unremarkable. There are mild degenerative changes in the thoracic and lumbar spine. Soft Tissues/Bones: No acute bone or soft tissue abnormality. Collapse of most of the left upper lobe radiating to the left hilum. There are mediastinal lymph nodes. Central left hilar tumor is not excluded. Recommend bronchoscopy. Loculated left pleural effusion and significant infiltrate in the left lower lobe. Mild fatty infiltration in the liver. Infrarenal abdominal aortic aneurysm measuring 5.5 cm. Surgical consultation is recommended. This is new since the prior study Prostatic enlargement etiology indeterminate. ASSESSMENT/PLAN:   5.0cm incidentally found infrarenal abdominal aortic aneurysm, community acquired pneumonia    Defer to pulmonology for pleural effusion, CAP  Cardiology consult  F/u w Dr. Catalina Meyer for possible outpatient EVAR     Gabe Beard DO  Surgery Resident PGY-1  8/28/2022  7:37 AM    Pt seen and examined    Denies abdominal or back pain    Pt is assx from 5.1 cm AAA    Cardiology for risk stratification    Once actue issues are fully addressed than would plan on evar as outpt in future    Discussed open vs. Evar with pt  Agreeable to evar in future    I reviewed the procedure with the patient and family as available. I discussed the procedure, risks, benefits, complications, and alternatives of the procedure. They understand and consent.   All questions were answered    Tootie Purcell MD

## 2022-08-28 NOTE — PROGRESS NOTES
CTA of Abd and Pelvis Reviewed   Preop (mm)/Patency   Proximal Aorta 31   At Renals 26   R Renal artery higher   L Renal artery lower   AAA 51   Aortic Bifurcation 27   R Common Iliac 18   R External Iliac 8   R Internal Iliac patent   R Common Femoral 11   L Common Iliac 16   L External Iliac 8   L Internal Iliac patent   L Common Femoral 10   Plan  EVAR    Patient would be candidate for stent graft in future - would be done as outpt as pt is assx    31 graft  20 limb right  18 limb left    Katie Pendleton MD

## 2022-08-28 NOTE — PROGRESS NOTES
This patient is on medication that requires renal, weight, and/or indication dose adjustment. Date Body Weight IBW  Adjusted BW SCr  CrCl Dialysis status   8/27/2022   Ideal body weight: 79.9 kg (176 lb 2.4 oz)  Adjusted ideal body weight: 89.7 kg (197 lb 11 oz) Serum creatinine: 0.7 mg/dL 08/27/22 0814  Estimated creatinine clearance: 133 mL/min N/a       Pharmacy has dose-adjusted the following medication(s):    Date Previous Order Adjusted Order   8/27/2022 Lovenox 40mg daily Lovenox 30mg twice daily       These changes were made per protocol according to the [facility] Clinical Guidance for Pharmacists. *Please note this dose may need readjusted if patient's condition changes. Please contact pharmacy with any questions regarding these changes.     HELENA Wright [facility]  8/27/2022  9:19 PM

## 2022-08-28 NOTE — CONSULTS
Pulmonary Consultation    Admit Date: 8/27/2022  Requesting Physician: No primary care provider on file. Reason for consultation:  Upper lobe mass/atelectasis  HPI:  The patient is a 82-year-old white male who presented to [address] yesterday because of chest pain and some shortness of breath. The pain is localized to the left side of the chest and is aggravated by deep inspiration. There is been associated cough productive of clear mucus but there is been no associated hemoptysis. Patient is a lifelong smoker continuing to smoke up until just a few days ago. In the emergency room at [place], [place], the patient underwent a CT scan of the chest.  As seen below, evidenced complete atelectasis of the left upper lobe with a suggestion of endobronchial obstruction. The patient does admit to weight loss but denies any bone pain. PMH:    Past Medical History:   Diagnosis Date    Hepatitis C     Hyperlipidemia     Hypertension     Intracranial hemorrhage (HCC)      PSH:   Past Surgical History:   Procedure Laterality Date    BACK SURGERY         Review of Systems:   Constitutional: As noted in the HPI. Eyes: No visual changes or diplopia. No scleral icterus. ENT: No headaches, hearing loss or vertigo. No nasal congestion, or sore throat. Cardiovascular: No chest pain, dyspnea on exertion, or palpitations. Respiratory: See above  Gastrointestinal: No abdominal pain, nausea or emesis. No diarrhea or rectal bleeding or melena. No change in bowel habits. Genitourinary: No dysuria, urinary frequency, or incontinence. No hematuria. Musculoskeletal: No gait disturbance, weakness or joint complaints. Integumentary: No rash or pruritis. No abnormal pigmentation, hair or nail changes. Neurological: No headache, diplopia, dizziness, tremor, change in muscle strength, numbness or tingling.  No change in gait, balance, coordination, mood, affect, memory, mentation, behavior. Psychiatric: No anxiety or depression. Endocrine: No temperature intolerance, excessive thirst, fluid intake, urinary frequency, excessive appetite, or recent weight change. Hematologic/Lymphatic: No abnormal bruising or bleeding, blood clots or swollen lymph nodes. No anemia, fever, chills, night sweats, or swollen glands. Allergic/Immunologic: No seasonal or perenial allergies. No history of hives or atopic dermatitis. Social History:  Alcohol: No  Tobacco:   Ongoing  Employment:  no silica or asbestos exposure  Family:  No family history of lung disease    Medications:   sodium chloride        amLODIPine  5 mg Oral Daily    atorvastatin  20 mg Oral Daily    sodium chloride flush  10 mL IntraVENous 2 times per day    enoxaparin  30 mg SubCUTAneous BID    cefTRIAXone (ROCEPHIN) IV  1,000 mg IntraVENous Q24H    doxycycline (VIBRAMYCIN) IV  100 mg IntraVENous Q12H       Vitals:  Tmax:  VITALS:  BP (!) 142/87   Pulse 81   Temp 98.4 °F (36.9 °C) (Oral)   Resp 18   Ht 6' 1\" (1.854 m)   Wt 230 lb (104.3 kg)   SpO2 91%   BMI 30.34 kg/m²   24HR INTAKE/OUTPUT:    Intake/Output Summary (Last 24 hours) at 2022 1009  Last data filed at 2022 0402  Gross per 24 hour   Intake 100 ml   Output --   Net 100 ml     CURRENT PULSE OXIMETRY:  SpO2: 91 %  24HR PULSE OXIMETRY RANGE:  SpO2  Av.5 %  Min: 91 %  Max: 96 %    EXAM:  General: No distress. Alert. Eyes: PERRL. No sclera icterus. No conjunctival injection. ENT: No discharge. Pharynx clear. Neck: Trachea midline. Normal thyroid. No jvd, no hjr. Resp: No wheezing. No accessory muscle use. Left anterior rales. No rhonchi. CV: Regular rate. Regular rhythm. No murmur No rub. Abd: Non-tender. Non-distended. No masses. No organmegaly. Normal bowel sounds. Skin: Warm and dry. No nodule on exposed extremities. No rash on exposed extremities. Lymph: No cervical LAD. No supraclavicular LAD. Ext: No joint deformity. No clubbing.  No cyanosis. No edema  Neuro: Awake. Follows commands. Positive pupils/gag/corneals. Normal pain response. Lab Results:  CBC:   Recent Labs     08/27/22  0814 08/28/22 0417   WBC 13.6* 12.9*   HGB 14.7 13.1   HCT 42.5 38.7   MCV 87.4 86.2    437       BMP:  Recent Labs     08/27/22  0814 08/28/22 0417    127*   K 4.0 4.1   CL 97* 95*   CO2 23 21*   BUN 8 9   CREATININE 0.7 0.7    ALB:3,BILIDIR:3,BILITOT:3,ALKPHOS:3)@    PT/INR:   Recent Labs     08/27/22 0814   PROTIME 13.5*   INR 1.2       Cultures:  Sputum: not available  Blood: not available    ABG:   No results for input(s): PH, PO2, PCO2, HCO3, BE, O2SAT, METHB, O2HB, COHB, O2CON, HHB, THB in the last 72 hours. Films:     No orders to display   . Assessment:  Left upper lobe collapse with associated volume loss and likely endobronchial obstruction. Malignancy is of primary concern. Plan:  Bronchoscopy with biopsy      Thanks for letting us see this patient in consultation. Total time in reviewing the previous admissions and records, reviewing the current x-rays, labs, and discussing with clinical staff including nursing and physicians, exceeded 50 minutes. Please contact us with any questions. Office [phone] or after hours through [employer], x [phone]. Please note that voice recognition technology was used (while wearing a Covid mandated mask) in the preparation of this note and make therefore it may contain inadvertent transcription errors. If the patient is a COVID 19 isolation patient, the above physical exam reflects that of the examining physician for the day. Karen Cameron MD,  M.D., F.C.C.P.     [facility] [address] [facility], [address] [place] [address]

## 2022-08-28 NOTE — PROGRESS NOTES
Received into [address] via cart with [facility] form [facility]. Tele applied. Oriented to environment. Assessment per flowsheet.

## 2022-08-28 NOTE — CONSULTS
[facility] MD Larisa Coronel, MD Gabi Sheikh, FELICIANO Mendoza, CAROLE  Consultation  Patient's Name: Suleman Russell  3:46 PM  8/28/2022    Nephrologist: Larisa Tristan MD    Reason for Consult: Hyponatremia  Requesting Physician:  No primary care provider on file. Chief Complaint: Cough    History Obtained From: Patient, chart    History of Present Ilness:    Mr. Nato Mccracken is a pleasant 62-year-old gentleman presented to the emergency department with progressive cough productive of thick-yellowish occasionally greenish sputum without hemoptysis. He notes he has \"bad\" chest pain with coughing and with deep inspiration localizing to his anterior ribs bilaterally. No fever or chill. No dyspnea on room air. Easy fatigability but denies dyspnea with exertion, as well. No anterior or substernal chest pain. No palpitations. No abdominal pain or cramping. He has been diagnosed with pneumonia and started empirically on antibiotics including ceftriaxone and doxycycline. May have received a dose of azithromycin yesterday, as well. Serum sodium presentation yesterday morning was 133 mmol/L, dropping to 127 mmol/L as of this morning. Notes that he does not \"drink a lot of water. \"  Denies nausea or vomiting, recent diarrhea, headache or lightheadedness, shortness of breath, peripheral swelling. He has taken quite a bit of ibuprofen in the last several days due to pain with deep breaths, but otherwise does not usually do so. Denies dysuria or hematuria. No SSRI. No thiazide that he knows of (he does take an antihypertensive medication, but he does not recall the name). Past Medical History:   Diagnosis Date    Hepatitis C     Hyperlipidemia     Hypertension     Intracranial hemorrhage ([facility] [address]        Past Surgical History:   Procedure Laterality Date    BACK SURGERY         History reviewed. No pertinent family history.      reports that he has been smoking cigarettes. He has a 22.50 pack-year smoking history. He has never used smokeless tobacco. He reports current alcohol use of about 8.3 standard drinks per week. He reports that he does not use drugs. Allergies:  Patient has no known allergies. Current Medications:    perflutren lipid microspheres (DEFINITY) injection 1.65 mg, ONCE PRN  amLODIPine (NORVASC) tablet 5 mg, Daily  atorvastatin (LIPITOR) tablet 20 mg, Daily  sodium chloride flush 0.9 % injection 10 mL, 2 times per day  sodium chloride flush 0.9 % injection 10 mL, PRN  0.9 % sodium chloride infusion, PRN  enoxaparin Sodium (LOVENOX) injection 30 mg, BID  promethazine (PHENERGAN) tablet 12.5 mg, Q6H PRN   Or  ondansetron (ZOFRAN) injection 4 mg, Q6H PRN  polyethylene glycol (GLYCOLAX) packet 17 g, Daily PRN  acetaminophen (TYLENOL) tablet 650 mg, Q6H PRN   Or  acetaminophen (TYLENOL) suppository 650 mg, Q6H PRN  cefTRIAXone (ROCEPHIN) 1,000 mg in sterile water 10 mL IV syringe, Q24H  doxycycline (VIBRAMYCIN) 100 mg in dextrose 5 % 100 mL IVPB ([id number]), Q12H  oxyCODONE-acetaminophen (PERCOCET) 5-325 MG per tablet 1 tablet, Q6H PRN  fentaNYL (SUBLIMAZE) injection 50 mcg, Q2H PRN        Review of Systems:   Pertinent items are noted in HPI.     Physical exam:   BP (!) 142/87   Pulse 81   Temp 98.4 °F (36.9 °C) (Oral)   Resp 18   Ht 6' 1\" (1.854 m)   Wt 230 lb (104.3 kg)   SpO2 91%   BMI 30.34 kg/m²   Age-appropriate white gentleman in no apparent distress  NC/AT EOMI sclera and conjunctiva clear and anicteric mucous membranes are moist  Neck soft supple trachea midline no bruit no JVD  Coarse breath sounds, crackles left base, no wheeze  Regular rhythm normal S1 and S2 no murmur no rub  Abdomen soft nontender nondistended normal active bowel sounds  Distal extremities are without edema  No rash or lesion on visible portions of integument  Pulses 2-3+ x4  Moves all 4 extremities  Cranial nerves II through XII grossly intact  Awake, alert, interactive and appropriate      Data:   Labs:  CBC with Differential:    Lab Results   Component Value Date/Time    WBC 12.9 08/28/2022 04:17 AM    RBC 4.49 08/28/2022 04:17 AM    HGB 13.1 08/28/2022 04:17 AM    HCT 38.7 08/28/2022 04:17 AM     08/28/2022 04:17 AM    MCV 86.2 08/28/2022 04:17 AM    MCH 29.2 08/28/2022 04:17 AM    MCHC 33.9 08/28/2022 04:17 AM    RDW 14.1 08/28/2022 04:17 AM    SEGSPCT 71 04/01/2013 09:30 AM    METASPCT 0.9 08/28/2022 04:17 AM    LYMPHOPCT 9.5 08/28/2022 04:17 AM    MONOPCT 12.2 08/28/2022 04:17 AM    BASOPCT 0.5 08/28/2022 04:17 AM    MONOSABS 1.55 08/28/2022 04:17 AM    LYMPHSABS 1.29 08/28/2022 04:17 AM    EOSABS 0.00 08/28/2022 04:17 AM    BASOSABS 0.00 08/28/2022 04:17 AM     CMP:    Lab Results   Component Value Date/Time     08/28/2022 01:49 PM    K 4.4 08/28/2022 01:49 PM    K 4.1 08/28/2022 04:17 AM    CL 92 08/28/2022 01:49 PM    CO2 22 08/28/2022 01:49 PM    BUN 9 08/28/2022 01:49 PM    CREATININE 0.7 08/28/2022 01:49 PM    GFRAA >60 08/28/2022 01:49 PM    LABGLOM >60 08/28/2022 01:49 PM    GLUCOSE 103 08/28/2022 01:49 PM    PROT 7.4 08/28/2022 04:17 AM    LABALBU 3.1 08/28/2022 04:17 AM    CALCIUM 9.2 08/28/2022 01:49 PM    BILITOT 0.4 08/28/2022 04:17 AM    ALKPHOS 70 08/28/2022 04:17 AM    AST 12 08/28/2022 04:17 AM    ALT 12 08/28/2022 04:17 AM     Ionized Calcium:  No results found for: IONCA  Magnesium:  No results found for: MG  Phosphorus:  No results found for: PHOS  U/A:    Lab Results   Component Value Date/Time    COLORU DARK YELLOW 04/01/2013 09:30 AM    PHUR 5.5 04/01/2013 09:30 AM    WBCUA >20 04/01/2013 09:30 AM    RBCUA 10-20 04/01/2013 09:30 AM    BACTERIA MANY 04/01/2013 09:30 AM    CLARITYU CLOUDY 04/01/2013 09:30 AM    SPECGRAV 1.015 04/01/2013 09:30 AM    LEUKOCYTESUR MODERATE 04/01/2013 09:30 AM    UROBILINOGEN 2.0 04/01/2013 09:30 AM    BILIRUBINUR SMALL 04/01/2013 09:30 AM    BLOODU LARGE 04/01/2013 09:30 AM GLUCOSEU NEGATIVE 04/01/2013 09:30 AM         Imaging:  No orders to display       Assessment  Hyponatremia, hypoosmolar, euvolemic versus mildly hypovolemic. Urinary indices are consistent with hypovolemic hyponatremia. Urine osmolality is pending at the time of this dictation. Given the pneumonia and interstitial infiltration, consider an SIADH-like syndrome, though doubt it    Recommendations  NS drip at conservative rate  Follow labs, UO  No SSRIs or thiazides or NSAIDs while here  Continue supportive care      Thank you for the opportunity to participate in the care of your pleasant patient. We look forward to following along with you.         Electronically signed by Juli Castro MD on 8/28/2022
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
## 2022-08-28 NOTE — PROGRESS NOTES
Dr. Nya Coyne notifies of consult thru answering service. Instructed to add patient to list that he will see patient in morning.

## 2022-08-28 NOTE — H&P
Hospitalist History & Physical      PCP: No primary care provider on file. Date of Service: Pt seen/examined on 8/27/2022     Chief Complaint:  had no chief complaint listed for this encounter. History Of Present Illness:    Mr. Daja Warner, a 74y.o. year old male  who  has a past medical history of Hepatitis C, Hyperlipidemia, Hypertension, and Intracranial hemorrhage ([address] Patient transferred from [address] [place] [address] where he presented with chest pain that been ongoing for 3 days. Worse with inspiration. EKG did not show any acute changes. Work-up revealed the patient had left-sided infiltrate and effusion as well as left upper lobe collapse. Also large loculated pleural effusion. Patient was started on azithromycin and Rocephin. Patient reported severe tearing chest pain. CTA revealed no evidence of dissection but did show an incidentally identified 5.5 cm infrarenal abdominal aortic aneurysm. Patient was then transferred to [facility] for further evaluation by vascular surgery and pulmonology. Past Medical History:   Diagnosis Date    Hepatitis C     Hyperlipidemia     Hypertension     Intracranial hemorrhage ([address]        Past Surgical History:   Procedure Laterality Date    BACK SURGERY         Prior to Admission medications    Medication Sig Start Date End Date Taking? Authorizing Provider   amLODIPine (NORVASC) 5 MG tablet Take 5 mg by mouth daily. Historical Provider, MD   atorvastatin (LIPITOR) 40 MG tablet Take 20 mg by mouth daily. Historical Provider, MD         Allergies:  Patient has no known allergies. Social History:    TOBACCO:   reports that he has been smoking cigarettes. He has a 22.50 pack-year smoking history. He has never used smokeless tobacco.  ETOH:   reports current alcohol use of about 8.3 standard drinks per week. Family History:    Reviewed in detail and negative for DM, CAD, Cancer, CVA. Positive as follows\"  History reviewed.  No pertinent family history. REVIEW OF SYSTEMS:   Pertinent positives as noted in the HPI. All other systems reviewed and negative. PHYSICAL EXAM:  BP (!) 150/91   Pulse 96   Temp 99.2 °F (37.3 °C) (Oral)   Resp 22   SpO2 96%   General appearance: No apparent distress, appears stated age and cooperative. HEENT: Normal cephalic, atraumatic without obvious deformity. Pupils equal, round, and reactive to light. Extra ocular muscles intact. Conjunctivae/corneas clear. Neck: Supple, with full range of motion. No jugular venous distention. Trachea midline. Respiratory: Coarse bilaterally  Cardiovascular: Regular rate and rhythm  Abdomen: Soft, nontender, nondistended  Musculoskeletal: No clubbing, cyanosis, edema of bilateral lower extremities. Brisk capillary refill. Skin: Normal skin color. No rashes or lesions. Neurologic:  Neurovascularly intact without any focal sensory/motor deficits. Cranial nerves: II-XII intact, grossly non-focal.  Psychiatric: Alert and oriented, thought content appropriate, normal insight    Reviewed EKG and CXR personally      CBC:   Recent Labs     08/27/22 0814   WBC 13.6*   RBC 4.86   HGB 14.7   HCT 42.5   MCV 87.4   RDW 14.6        BMP:   Recent Labs     08/27/22 0814      K 4.0   CL 97*   CO2 23   BUN 8   CREATININE 0.7     LFT:  Recent Labs     08/27/22 0814   PROT 8.0   ALKPHOS 64   ALT 16   AST 18   BILITOT 0.5     CE:  No results for input(s): Pamla Clines in the last 72 hours. PT/INR:   Recent Labs     08/27/22 0814   INR 1.2   APTT 37.8*     BNP: No results for input(s): BNP in the last 72 hours.   ESR: No results found for: SEDRATE  CRP: No results found for: CRP  D Dimer: No results found for: DDIMER   Folate and B12: No results found for: [id number], No results found for: FOLATE  Lactic Acid:   Lab Results   Component Value Date    LACTA 2.2 11/23/2014     Thyroid Studies: No results found for: TSH, L5IIQEO, Z1SATIC, THYROIDAB    Oupatient labs:  Lab Results   Component Value Date    INR 1.2 08/27/2022       Urinalysis:    Lab Results   Component Value Date/Time    NITRU POSITIVE 04/01/2013 09:30 AM    WBCUA >20 04/01/2013 09:30 AM    BACTERIA MANY 04/01/2013 09:30 AM    RBCUA 10-20 04/01/2013 09:30 AM    BLOODU LARGE 04/01/2013 09:30 AM    SPECGRAV 1.015 04/01/2013 09:30 AM    GLUCOSEU NEGATIVE 04/01/2013 09:30 AM       Imaging:  XR CHEST PORTABLE    Result Date: 8/27/2022  EXAMINATION: ONE XRAY VIEW OF THE CHEST 8/27/2022 8:27 am COMPARISON: 11/23/2014 HISTORY: ORDERING SYSTEM PROVIDED HISTORY: chest pain TECHNOLOGIST PROVIDED HISTORY: Reason for exam:->chest pain FINDINGS: There is some scarring/atelectasis in the left hemithorax and significant sized loculated effusion. Right lung is clear. Heart size is normal.     Significant size loculated left effusion is suspected. There is some areas of scarring/atelectasis. Recommend follow-up chest CT post contrast.  Other etiologies are not excluded     CTA CHEST W CONTRAST    Result Date: 8/27/2022  EXAMINATION: CTA OF THE CHEST; CTA OF THE ABDOMEN AND PELVIS WITH CONTRAST 8/27/2022 9:55 am TECHNIQUE: CTA of the chest was performed after the administration of intravenous contrast.  Multiplanar reformatted images are provided for review. MIP images are provided for review. Automated exposure control, iterative reconstruction, and/or weight based adjustment of the mA/kV was utilized to reduce the radiation dose to as low as reasonably achievable.; CTA of the abdomen and pelvis was performed with the administration of intravenous contrast. Multiplanar reformatted images are provided for review. MIP images are provided for review. Automated exposure control, iterative reconstruction, and/or weight based adjustment of the mA/kV was utilized to reduce the radiation dose to as low as reasonably achievable.  COMPARISON: 11/23/2014 HISTORY: ORDERING SYSTEM PROVIDED HISTORY: chest pain; eval dissection TECHNOLOGIST PROVIDED HISTORY: Reason for exam:->chest pain; eval dissection Decision Support Exception - unselect if not a suspected or confirmed emergency medical condition->Emergency Medical Condition (MA) FINDINGS: Pulmonary Arteries: Pulmonary arteries are suboptimally opacified for evaluation. No obvious evidence of intraluminal filling defect to suggest pulmonary embolism. Main pulmonary artery is normal in caliber. Mediastinum: There are superior mediastinal lymph nodes on the left. Largest measures 2 cm. .  The heart and pericardium demonstrate no acute abnormality. There is no acute abnormality of the thoracic aorta. Some low-attenuation areas in the thyroid gland are not felt to be significant. There is mild coronary arterial vascular plaque. There is some plaque in the descending thoracic aorta. No evidence of thoracic aortic dissection or aneurysm. Lungs/pleura: There is collapse of much of the left upper lobe. This radiates to the left hilar region. .  There is a moderate size left pleural effusion with loculated components. There is interstitial and parenchymal infiltrate in the left lower lobe. Abdomen and pelvis: There is fatty infiltration in the liver. Spleen and pancreas are normal.  Kidneys are unremarkable. Origin of the celiac axis and SMA and single renal artery to each kidney is unremarkable. There is an infrarenal abdominal aortic aneurysm measuring 5.5 cm. This tapers at the bifurcation. There is mild arterial megaly of the common iliac arteries bilaterally. There is borderline wall prominence of the bladder. There is prostatic enlargement etiology indeterminate. No abdominal or pelvic lymphadenopathy or fluid collections are noted. Bowel loops are unremarkable. There are mild degenerative changes in the thoracic and lumbar spine. Soft Tissues/Bones: No acute bone or soft tissue abnormality. Collapse of most of the left upper lobe radiating to the left hilum.   There are mediastinal lymph nodes. Central left hilar tumor is not excluded. Recommend bronchoscopy. Loculated left pleural effusion and significant infiltrate in the left lower lobe. Mild fatty infiltration in the liver. Infrarenal abdominal aortic aneurysm measuring 5.5 cm. Surgical consultation is recommended. This is new since the prior study Prostatic enlargement etiology indeterminate. CTA ABDOMEN PELVIS W CONTRAST    Result Date: 8/27/2022  EXAMINATION: CTA OF THE CHEST; CTA OF THE ABDOMEN AND PELVIS WITH CONTRAST 8/27/2022 9:55 am TECHNIQUE: CTA of the chest was performed after the administration of intravenous contrast.  Multiplanar reformatted images are provided for review. MIP images are provided for review. Automated exposure control, iterative reconstruction, and/or weight based adjustment of the mA/kV was utilized to reduce the radiation dose to as low as reasonably achievable.; CTA of the abdomen and pelvis was performed with the administration of intravenous contrast. Multiplanar reformatted images are provided for review. MIP images are provided for review. Automated exposure control, iterative reconstruction, and/or weight based adjustment of the mA/kV was utilized to reduce the radiation dose to as low as reasonably achievable. COMPARISON: 11/23/2014 HISTORY: ORDERING SYSTEM PROVIDED HISTORY: chest pain; eval dissection TECHNOLOGIST PROVIDED HISTORY: Reason for exam:->chest pain; eval dissection Decision Support Exception - unselect if not a suspected or confirmed emergency medical condition->Emergency Medical Condition (MA) FINDINGS: Pulmonary Arteries: Pulmonary arteries are suboptimally opacified for evaluation. No obvious evidence of intraluminal filling defect to suggest pulmonary embolism. Main pulmonary artery is normal in caliber. Mediastinum: There are superior mediastinal lymph nodes on the left. Largest measures 2 cm. .  The heart and pericardium demonstrate no acute abnormality.  There is no acute abnormality of the thoracic aorta. Some low-attenuation areas in the thyroid gland are not felt to be significant. There is mild coronary arterial vascular plaque. There is some plaque in the descending thoracic aorta. No evidence of thoracic aortic dissection or aneurysm. Lungs/pleura: There is collapse of much of the left upper lobe. This radiates to the left hilar region. .  There is a moderate size left pleural effusion with loculated components. There is interstitial and parenchymal infiltrate in the left lower lobe. Abdomen and pelvis: There is fatty infiltration in the liver. Spleen and pancreas are normal.  Kidneys are unremarkable. Origin of the celiac axis and SMA and single renal artery to each kidney is unremarkable. There is an infrarenal abdominal aortic aneurysm measuring 5.5 cm. This tapers at the bifurcation. There is mild arterial megaly of the common iliac arteries bilaterally. There is borderline wall prominence of the bladder. There is prostatic enlargement etiology indeterminate. No abdominal or pelvic lymphadenopathy or fluid collections are noted. Bowel loops are unremarkable. There are mild degenerative changes in the thoracic and lumbar spine. Soft Tissues/Bones: No acute bone or soft tissue abnormality. Collapse of most of the left upper lobe radiating to the left hilum. There are mediastinal lymph nodes. Central left hilar tumor is not excluded. Recommend bronchoscopy. Loculated left pleural effusion and significant infiltrate in the left lower lobe. Mild fatty infiltration in the liver. Infrarenal abdominal aortic aneurysm measuring 5.5 cm. Surgical consultation is recommended. This is new since the prior study Prostatic enlargement etiology indeterminate.        ASSESSMENT:  -Left upper lung collapse  -Loculated pleural effusion  -Community-acquired pneumonia  -5.5 cm infrarenal abdominal aorta aneurysm  -Leukocytosis  -Hypertension  -Hyperlipidemia      PLAN:  FEN admit to medicine  -Consult vascular surgery  -Consult pulmonology  -N.p.o. after midnight  -Rocephin and doxycycline  -Respiratory cultures  -Telemetry  -Continue home medications        Diet: No diet orders on file  Code Status: Prior  Surrogate decision maker confirmed with patient:   Extended Emergency Contact Information  Primary Emergency Contact: Martha Marsh  Home Phone: [phone]  Relation: Other  Secondary Emergency Contact: Marielle Singh  Address: [address] [place] [address], [address] Phone: [phone]  Relation: Spouse    DVT Prophylaxis: []Lovenox []Heparin []PCD [] [address] []Encouraged ambulation  Disposition: []Med/Surg [] Intermediate [] ICU/CCU  Admit status: [] Observation [] Inpatient     +++++++++++++++++++++++++++++++++++++++++++++++++  Andrew Hough DO  +++++++++++++++++++++++++++++++++++++++++++++++++  NOTE: This report was transcribed using voice recognition software. Every effort was made to ensure accuracy; however, inadvertent computerized transcription errors may be present.

## 2022-08-28 NOTE — PLAN OF CARE
Problem: Discharge Planning  Goal: Discharge to home or other facility with appropriate resources  Outcome: Progressing  Flowsheets (Taken 8/27/2022 2036)  Discharge to home or other facility with appropriate resources:   Identify barriers to discharge with patient and caregiver   Arrange for needed discharge resources and transportation as appropriate   Identify discharge learning needs (meds, wound care, etc)   Refer to discharge planning if patient needs post-hospital services based on physician order or complex needs related to functional status, cognitive ability or social support system   Arrange for interpreters to assist at discharge as needed     Problem: Chronic Conditions and Co-morbidities  Goal: Patient's chronic conditions and co-morbidity symptoms are monitored and maintained or improved  Outcome: Progressing

## 2022-08-29 ENCOUNTER — ANESTHESIA (OUTPATIENT)
Dept: ENDOSCOPY | Age: 67
DRG: 180 | End: 2022-08-29
Payer: MEDICARE

## 2022-08-29 ENCOUNTER — ANESTHESIA EVENT (OUTPATIENT)
Dept: ENDOSCOPY | Age: 67
DRG: 180 | End: 2022-08-29
Payer: MEDICARE

## 2022-08-29 PROBLEM — J90 LOCULATED PLEURAL EFFUSION: Status: ACTIVE | Noted: 2022-08-29

## 2022-08-29 LAB
ALBUMIN SERPL-MCNC: 2.6 G/DL (ref 3.5–5.2)
ALP BLD-CCNC: 55 U/L (ref 40–129)
ALT SERPL-CCNC: 16 U/L (ref 0–40)
ANION GAP SERPL CALCULATED.3IONS-SCNC: 10 MMOL/L (ref 7–16)
AST SERPL-CCNC: 24 U/L (ref 0–39)
BASOPHILS ABSOLUTE: 0.11 E9/L (ref 0–0.2)
BASOPHILS RELATIVE PERCENT: 0.9 % (ref 0–2)
BILIRUB SERPL-MCNC: 0.4 MG/DL (ref 0–1.2)
BUN BLDV-MCNC: 9 MG/DL (ref 6–23)
CALCIUM SERPL-MCNC: 8.7 MG/DL (ref 8.6–10.2)
CHLORIDE BLD-SCNC: 95 MMOL/L (ref 98–107)
CO2: 24 MMOL/L (ref 22–29)
CREAT SERPL-MCNC: 0.7 MG/DL (ref 0.7–1.2)
EOSINOPHILS ABSOLUTE: 0.11 E9/L (ref 0.05–0.5)
EOSINOPHILS RELATIVE PERCENT: 0.9 % (ref 0–6)
GFR AFRICAN AMERICAN: >60
GFR NON-AFRICAN AMERICAN: >60 ML/MIN/1.73
GLUCOSE BLD-MCNC: 102 MG/DL (ref 74–99)
HCT VFR BLD CALC: 33.8 % (ref 37–54)
HEMOGLOBIN: 12.4 G/DL (ref 12.5–16.5)
LYMPHOCYTES ABSOLUTE: 0.96 E9/L (ref 1.5–4)
LYMPHOCYTES RELATIVE PERCENT: 7.8 % (ref 20–42)
MAGNESIUM: 1.8 MG/DL (ref 1.6–2.6)
MCH RBC QN AUTO: 33.1 PG (ref 26–35)
MCHC RBC AUTO-ENTMCNC: 36.7 % (ref 32–34.5)
MCV RBC AUTO: 90.1 FL (ref 80–99.9)
MONOCYTES ABSOLUTE: 1.32 E9/L (ref 0.1–0.95)
MONOCYTES RELATIVE PERCENT: 11.3 % (ref 2–12)
NEUTROPHILS ABSOLUTE: 9.48 E9/L (ref 1.8–7.3)
NEUTROPHILS RELATIVE PERCENT: 79.1 % (ref 43–80)
NUCLEATED RED BLOOD CELLS: 0.9 /100 WBC
OVALOCYTES: ABNORMAL
PDW BLD-RTO: 14.5 FL (ref 11.5–15)
PHOSPHORUS: 3.5 MG/DL (ref 2.5–4.5)
PLATELET # BLD: 430 E9/L (ref 130–450)
PMV BLD AUTO: 8.9 FL (ref 7–12)
POIKILOCYTES: ABNORMAL
POLYCHROMASIA: ABNORMAL
POTASSIUM REFLEX MAGNESIUM: 4.6 MMOL/L (ref 3.5–5)
POTASSIUM SERPL-SCNC: 4.6 MMOL/L (ref 3.5–5)
RBC # BLD: 3.75 E12/L (ref 3.8–5.8)
ROULEAUX: ABNORMAL
SODIUM BLD-SCNC: 129 MMOL/L (ref 132–146)
TOTAL PROTEIN: 6.9 G/DL (ref 6.4–8.3)
WBC # BLD: 12 E9/L (ref 4.5–11.5)

## 2022-08-29 PROCEDURE — 6370000000 HC RX 637 (ALT 250 FOR IP): Performed by: FAMILY MEDICINE

## 2022-08-29 PROCEDURE — 3700000000 HC ANESTHESIA ATTENDED CARE: Performed by: INTERNAL MEDICINE

## 2022-08-29 PROCEDURE — 3700000001 HC ADD 15 MINUTES (ANESTHESIA): Performed by: INTERNAL MEDICINE

## 2022-08-29 PROCEDURE — 7100000001 HC PACU RECOVERY - ADDTL 15 MIN: Performed by: INTERNAL MEDICINE

## 2022-08-29 PROCEDURE — 88341 IMHCHEM/IMCYTCHM EA ADD ANTB: CPT

## 2022-08-29 PROCEDURE — 7100000000 HC PACU RECOVERY - FIRST 15 MIN: Performed by: INTERNAL MEDICINE

## 2022-08-29 PROCEDURE — 6360000002 HC RX W HCPCS: Performed by: NURSE ANESTHETIST, CERTIFIED REGISTERED

## 2022-08-29 PROCEDURE — 3609011300 HC BRONCHOSCOPY BRONCHIAL/ENDOBRNCL BX 1+ SITES: Performed by: INTERNAL MEDICINE

## 2022-08-29 PROCEDURE — 99233 SBSQ HOSP IP/OBS HIGH 50: CPT | Performed by: INTERNAL MEDICINE

## 2022-08-29 PROCEDURE — 2500000003 HC RX 250 WO HCPCS: Performed by: INTERNAL MEDICINE

## 2022-08-29 PROCEDURE — 0BB88ZX EXCISION OF LEFT UPPER LOBE BRONCHUS, VIA NATURAL OR ARTIFICIAL OPENING ENDOSCOPIC, DIAGNOSTIC: ICD-10-PCS | Performed by: INTERNAL MEDICINE

## 2022-08-29 PROCEDURE — 83735 ASSAY OF MAGNESIUM: CPT

## 2022-08-29 PROCEDURE — 36415 COLL VENOUS BLD VENIPUNCTURE: CPT

## 2022-08-29 PROCEDURE — 99232 SBSQ HOSP IP/OBS MODERATE 35: CPT

## 2022-08-29 PROCEDURE — 88305 TISSUE EXAM BY PATHOLOGIST: CPT

## 2022-08-29 PROCEDURE — 84100 ASSAY OF PHOSPHORUS: CPT

## 2022-08-29 PROCEDURE — 2580000003 HC RX 258

## 2022-08-29 PROCEDURE — 85025 COMPLETE CBC W/AUTO DIFF WBC: CPT

## 2022-08-29 PROCEDURE — 6360000002 HC RX W HCPCS: Performed by: FAMILY MEDICINE

## 2022-08-29 PROCEDURE — 2140000000 HC CCU INTERMEDIATE R&B

## 2022-08-29 PROCEDURE — 2709999900 HC NON-CHARGEABLE SUPPLY: Performed by: INTERNAL MEDICINE

## 2022-08-29 PROCEDURE — 2700000000 HC OXYGEN THERAPY PER DAY

## 2022-08-29 PROCEDURE — 80053 COMPREHEN METABOLIC PANEL: CPT

## 2022-08-29 PROCEDURE — 2500000003 HC RX 250 WO HCPCS: Performed by: FAMILY MEDICINE

## 2022-08-29 PROCEDURE — 6370000000 HC RX 637 (ALT 250 FOR IP): Performed by: INTERNAL MEDICINE

## 2022-08-29 PROCEDURE — 88342 IMHCHEM/IMCYTCHM 1ST ANTB: CPT

## 2022-08-29 PROCEDURE — 2580000003 HC RX 258: Performed by: FAMILY MEDICINE

## 2022-08-29 RX ORDER — SODIUM CHLORIDE 9 MG/ML
INJECTION, SOLUTION INTRAVENOUS PRN
Status: CANCELLED | OUTPATIENT
Start: 2022-08-29

## 2022-08-29 RX ORDER — SODIUM CHLORIDE 0.9 % (FLUSH) 0.9 %
5-40 SYRINGE (ML) INJECTION PRN
Status: CANCELLED | OUTPATIENT
Start: 2022-08-29

## 2022-08-29 RX ORDER — PROPOFOL 10 MG/ML
INJECTION, EMULSION INTRAVENOUS PRN
Status: DISCONTINUED | OUTPATIENT
Start: 2022-08-29 | End: 2022-08-29 | Stop reason: SDUPTHER

## 2022-08-29 RX ORDER — SODIUM CHLORIDE 0.9 % (FLUSH) 0.9 %
5-40 SYRINGE (ML) INJECTION EVERY 12 HOURS SCHEDULED
Status: CANCELLED | OUTPATIENT
Start: 2022-08-29

## 2022-08-29 RX ORDER — SODIUM CHLORIDE 9 MG/ML
INJECTION, SOLUTION INTRAVENOUS CONTINUOUS PRN
Status: DISCONTINUED | OUTPATIENT
Start: 2022-08-29 | End: 2022-08-29 | Stop reason: SDUPTHER

## 2022-08-29 RX ORDER — LIDOCAINE HYDROCHLORIDE 10 MG/ML
INJECTION, SOLUTION EPIDURAL; INFILTRATION; INTRACAUDAL; PERINEURAL PRN
Status: DISCONTINUED | OUTPATIENT
Start: 2022-08-29 | End: 2022-08-29 | Stop reason: ALTCHOICE

## 2022-08-29 RX ORDER — ATORVASTATIN CALCIUM 40 MG/1
40 TABLET, FILM COATED ORAL DAILY
Status: DISCONTINUED | OUTPATIENT
Start: 2022-08-29 | End: 2022-08-31 | Stop reason: HOSPADM

## 2022-08-29 RX ADMIN — ENOXAPARIN SODIUM 30 MG: 100 INJECTION SUBCUTANEOUS at 08:35

## 2022-08-29 RX ADMIN — OXYCODONE AND ACETAMINOPHEN 1 TABLET: 5; 325 TABLET ORAL at 19:00

## 2022-08-29 RX ADMIN — ENOXAPARIN SODIUM 30 MG: 100 INJECTION SUBCUTANEOUS at 21:48

## 2022-08-29 RX ADMIN — CEFTRIAXONE 1000 MG: 1 INJECTION, POWDER, FOR SOLUTION INTRAMUSCULAR; INTRAVENOUS at 12:42

## 2022-08-29 RX ADMIN — OXYCODONE AND ACETAMINOPHEN 1 TABLET: 5; 325 TABLET ORAL at 12:42

## 2022-08-29 RX ADMIN — Medication 10 ML: at 08:36

## 2022-08-29 RX ADMIN — ATORVASTATIN CALCIUM 40 MG: 40 TABLET, FILM COATED ORAL at 08:36

## 2022-08-29 RX ADMIN — Medication 10 ML: at 21:49

## 2022-08-29 RX ADMIN — PROPOFOL 340 MG: 10 INJECTION, EMULSION INTRAVENOUS at 14:40

## 2022-08-29 RX ADMIN — DOXYCYCLINE 100 MG: 100 INJECTION, POWDER, LYOPHILIZED, FOR SOLUTION INTRAVENOUS at 10:51

## 2022-08-29 RX ADMIN — DOXYCYCLINE 100 MG: 100 INJECTION, POWDER, LYOPHILIZED, FOR SOLUTION INTRAVENOUS at 21:49

## 2022-08-29 RX ADMIN — AMLODIPINE BESYLATE 5 MG: 5 TABLET ORAL at 08:36

## 2022-08-29 RX ADMIN — OXYCODONE AND ACETAMINOPHEN 1 TABLET: 5; 325 TABLET ORAL at 05:52

## 2022-08-29 RX ADMIN — SODIUM CHLORIDE: 9 INJECTION, SOLUTION INTRAVENOUS at 14:10

## 2022-08-29 ASSESSMENT — PAIN DESCRIPTION - ORIENTATION
ORIENTATION: LEFT

## 2022-08-29 ASSESSMENT — PAIN SCALES - GENERAL
PAINLEVEL_OUTOF10: 7
PAINLEVEL_OUTOF10: 6
PAINLEVEL_OUTOF10: 0

## 2022-08-29 ASSESSMENT — PAIN DESCRIPTION - LOCATION
LOCATION: ABDOMEN

## 2022-08-29 ASSESSMENT — PAIN DESCRIPTION - DESCRIPTORS
DESCRIPTORS: ACHING;DISCOMFORT;NAGGING
DESCRIPTORS: ACHING

## 2022-08-29 NOTE — CARE COORDINATION
8/29 Care Coordination: Pt presented to the emergency department for evaluation of Chest Pain. Patient has a past medical history of HTN, HLD, history of intracranial hemorrhage in 2014. CT scan showed evidenced complete atelectasis of the left upper lobe with a suggestion of endobronchial obstruction. CTA revealed no evidence of dissection but did show an incidentally identified 5.5 cm infrarenal abdominal aortic aneurysm. Patient was then transferred to [facility] for further evaluation by vascular surgery and pulmonology. Plan for bronch today. Called room no answer. Per note pt was Independent PTA, works on his [employer]. Will try to visit pt later. CM/SW will continue to follow for discharge planning.    Larry CARRERON,RN-CV-BC  [phone]
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
## 2022-08-29 NOTE — OP NOTE
Operative Note      Patient: Adrienne Mesa  YOB: 1955  MRN: [id number]    Date of Procedure: 8/29/2022    Pre-Op Diagnosis: Atelectasis [J98.11]    Post-Op Diagnosis:  endobronchial mass       Procedure(s):  BRONCHOSCOPY BIOPSY BRONCHUS    Surgeon(s):  Magali Hauser MD    Assistant:   * No surgical staff found *    Anesthesia: Monitor Anesthesia Care    Estimated Blood Loss (mL): Minimal    Complications: None    Specimens:   ID Type Source Tests Collected by Time Destination   A : ILENE BX Tissue Bronchial Biopsy SURGICAL PATHOLOGY Magali Hauser MD 8/29/2022 1444        Implants:  * No implants in log *      Drains: * No LDAs found *    Findings: endobronchial mass obstructing left upper lobe bronchi    Detailed Description of Procedure:   Bronchoscope was passed through the oral cavity. Vocal cords were visualized and inspected, they moved symmetrically. Scope was passed through the cords and all airways were visualized and inspected. Minimal whitish secretions were seen in all airways and suctioned. An obstructing endobronchial mass was seen at the left upper lobe. TBB were done and samples to be sent to pathology. Minimal bleeding seen from biopsy, washing done, no further bleeding seen prior to removal of scope. Pt tolerated the procedure, will be sent to PACU for observation.     Electronically signed by Shorty Salazar MD on 8/29/2022 at 2:56 PM

## 2022-08-29 NOTE — PROGRESS NOTES
Hospitalist Progress Note      SYNOPSIS: Patient admitted on 2022 for Pneumonia      SUBJECTIVE:    Patient seen and examined. Discussed CT abdomen and CT chest results and need for bronchoscopy. Records reviewed. Mr. Juan Diego Billings, a 74y.o. year old male  who  has a past medical history of Hepatitis C, Hyperlipidemia, Hypertension, and Intracranial hemorrhage ([facility] [address] Patient transferred from [facility] where he presented with chest pain that been ongoing for 3 days. Worse with inspiration. EKG did not show any acute changes. Work-up revealed the patient had left-sided infiltrate and effusion as well as left upper lobe collapse. Also large loculated pleural effusion. Patient was started on azithromycin and Rocephin. Patient reported severe tearing chest pain. CTA revealed no evidence of dissection but did show an incidentally identified 5.5 cm infrarenal abdominal aortic aneurysm. Patient was then transferred to [facility] for further evaluation by vascular surgery and pulmonology. Stable overnight. No other overnight issues reported. Temp (24hrs), Av.7 °F (37.1 °C), Min:98.4 °F (36.9 °C), Max:98.8 °F (37.1 °C)    DIET: Diet NPO Exceptions are: Sips of Water with Meds  Diet NPO Exceptions are: Sips of Water with Meds  CODE: Full Code    Intake/Output Summary (Last 24 hours) at 2022 1213  Last data filed at 2022 2307  Gross per 24 hour   Intake 0 ml   Output --   Net 0 ml       OBJECTIVE:    /75   Pulse 71   Temp 98.8 °F (37.1 °C)   Resp 16   Ht 6' 1\" (1.854 m)   Wt 230 lb (104.3 kg)   SpO2 95%   BMI 30.34 kg/m²     General appearance: No apparent distress, appears stated age and cooperative. HEENT:  Conjunctivae/corneas clear. Neck: Supple. No jugular venous distention.    Respiratory: Clear to auscultation bilaterally, normal respiratory effort  Cardiovascular: Regular rate rhythm, normal S1-S2  Abdomen: Soft, nontender, nondistended  Musculoskeletal: No clubbing, cyanosis, no bilateral lower extremity edema. Brisk capillary refill. Skin:  No rashes  on visible skin  Neurologic: awake, alert and following commands       Assessment  1. Large left loculated effusion with left upper lung collapse  2. Community-acquired pneumonia  3.  5.5 cm infrarenal abdominal aortic aneurysm  4. Hyponatremia  6. Hypertension    Plan   1. slight improvement in sodium 129 today. CT abdomen pelvis ordered by vascular surgery given infrarenal abdominal aortic aneurysm per vascular surgery will likely benefit from stent graft in the future. CT abdomen pelvis and CT thorax with contrast showed cholelithiasis with left upper lobe artery to the left helium with mistakenly feeds central hilar left smoker not excluded recommended bronchoscopy. Loculated left effusion with severe infiltrates in the left lung. 5.5 cm infrarenal abdominal aortic aneurysm. 2. Cardiology was consulted for preoperative cardiac evaluation. Echocardiogram ordered. Once stable from a respiratory standpoint he would likely benefit from stress test prior to vascular surgery. Nephrology following for hyponatremia. 3. Per pulmonology plan for bronchoscopy with biopsy given concern for malignancy concern left upper lobe lung collapse with possible endobronchial obstruction.           DISPOSITION:     Medications:  REVIEWED DAILY    Infusion Medications    sodium chloride 75 mL/hr at 08/29/22 0835    sodium chloride       Scheduled Medications    atorvastatin  40 mg Oral Daily    amLODIPine  5 mg Oral Daily    sodium chloride flush  10 mL IntraVENous 2 times per day    enoxaparin  30 mg SubCUTAneous BID    cefTRIAXone (ROCEPHIN) IV  1,000 mg IntraVENous Q24H    doxycycline (VIBRAMYCIN) IV  100 mg IntraVENous Q12H     PRN Meds: regadenoson, perflutren lipid microspheres, sodium chloride flush, sodium chloride, promethazine **OR** ondansetron, polyethylene glycol, acetaminophen **OR** acetaminophen, oxyCODONE-acetaminophen, fentanNYL    Labs:     Recent Labs     08/27/22  0814 08/28/22 0417 08/29/22 0413   WBC 13.6* 12.9* 12.0*   HGB 14.7 13.1 12.4*   HCT 42.5 38.7 33.8*    437 430       Recent Labs     08/28/22  1349 08/28/22  1952 08/29/22 0413   * 128* 129*   K 4.4 4.5 4.6  4.6   CL 92* 93* 95*   CO2 22 21* 24   BUN 9 9 9   CREATININE 0.7 0.7 0.7   CALCIUM 9.2 9.1 8.7   PHOS  --   --  3.5       Recent Labs     08/27/22  0814 08/28/22 0417 08/29/22 0413   PROT 8.0 7.4 6.9   ALKPHOS 64 70 55   ALT 16 12 16   AST 18 12 24   BILITOT 0.5 0.4 0.4       Recent Labs     08/27/22 0814   INR 1.2       No results for input(s): CKTOTAL, TROPONINI in the last 72 hours. Chronic labs:    Lab Results   Component Value Date    TSH 4.520 (H) 08/28/2022    INR 1.2 08/27/2022       Radiology: REVIEWED DAILY    +++++++++++++++++++++++++++++++++++++++++++++++++  Sascha Dhaliwal MD  [facility] Physician - [address], [place]  +++++++++++++++++++++++++++++++++++++++++++++++++  NOTE: This report was transcribed using voice recognition software. Every effort was made to ensure accuracy; however, inadvertent computerized transcription errors may be present.

## 2022-08-29 NOTE — PROGRESS NOTES
Vascular Surgery Progress Note    Pt is being seen in f/u today regarding AAA 5.5 cm    Subjective  Pt s/e. Resting comfortably in bed. Denies pain. Going for bronchoscopy today. Needs ECHO and he says he is going for stress test tomorrow. Understands his AAA will be repaired as an outpt.      Current Medications:    sodium chloride 75 mL/hr at 08/29/22 0835    sodium chloride        regadenoson, perflutren lipid microspheres, sodium chloride flush, sodium chloride, promethazine **OR** ondansetron, polyethylene glycol, acetaminophen **OR** acetaminophen, oxyCODONE-acetaminophen, fentanNYL    atorvastatin  40 mg Oral Daily    amLODIPine  5 mg Oral Daily    sodium chloride flush  10 mL IntraVENous 2 times per day    enoxaparin  30 mg SubCUTAneous BID    cefTRIAXone (ROCEPHIN) IV  1,000 mg IntraVENous Q24H    doxycycline (VIBRAMYCIN) IV  100 mg IntraVENous Q12H      PHYSICAL EXAM:    /75   Pulse 71   Temp 98.8 °F (37.1 °C)   Resp 16   Ht 6' 1\" (1.854 m)   Wt 230 lb (104.3 kg)   SpO2 95%   BMI 30.34 kg/m²     Intake/Output Summary (Last 24 hours) at 8/29/2022 1114  Last data filed at 8/28/2022 2307  Gross per 24 hour   Intake 0 ml   Output --   Net 0 ml        Gen Awake, alert, oriented x3, in no apparent distress   CVS S1S2   Resp No increased work of breathing   Abd Soft, non-tender, non-distended   R LE DP/PT biphasic, fem 2+   2+ edema  L LE DP/PT biphasic, fem 2+   2+ edema    LABS:    Lab Results   Component Value Date    WBC 12.0 (H) 08/29/2022    HGB 12.4 (L) 08/29/2022    HCT 33.8 (L) 08/29/2022     08/29/2022    PROTIME 13.5 (H) 08/27/2022    INR 1.2 08/27/2022    APTT 37.8 (H) 08/27/2022    K 4.6 08/29/2022    K 4.6 08/29/2022    BUN 9 08/29/2022    CREATININE 0.7 08/29/2022     A/P PNA with loculated effusion, Assx 5.1 cm AAA  Continue statin  Emphasized importance of tobacco cessation  For bronchoscopy today with pulmonary  Hyponatremia: Na 129 - nephrology following  Abx per primary: IV rocephin and doxy  Discussed with patient symptoms of abdominal pain, back pain and need to go to ER immediately if they if any symptoms developed  Will plan on EVAR in the future once acute issues resolved  Per Cardiology will need Stress test and ECHO prior to EVAR    Jenni Perry, BRITTNEY - CNP

## 2022-08-30 ENCOUNTER — APPOINTMENT (OUTPATIENT)
Dept: NUCLEAR MEDICINE | Age: 67
DRG: 180 | End: 2022-08-30
Attending: INTERNAL MEDICINE
Payer: MEDICARE

## 2022-08-30 ENCOUNTER — APPOINTMENT (OUTPATIENT)
Dept: NON INVASIVE DIAGNOSTICS | Age: 67
DRG: 180 | End: 2022-08-30
Attending: INTERNAL MEDICINE
Payer: MEDICARE

## 2022-08-30 PROBLEM — J98.11 ATELECTASIS: Status: ACTIVE | Noted: 2022-08-30

## 2022-08-30 LAB
ALBUMIN SERPL-MCNC: 2.4 G/DL (ref 3.5–5.2)
ALP BLD-CCNC: 59 U/L (ref 40–129)
ALT SERPL-CCNC: 27 U/L (ref 0–40)
ANION GAP SERPL CALCULATED.3IONS-SCNC: 10 MMOL/L (ref 7–16)
AST SERPL-CCNC: 38 U/L (ref 0–39)
BASOPHILS ABSOLUTE: 0.04 E9/L (ref 0–0.2)
BASOPHILS RELATIVE PERCENT: 0.3 % (ref 0–2)
BILIRUB SERPL-MCNC: 0.4 MG/DL (ref 0–1.2)
BUN BLDV-MCNC: 7 MG/DL (ref 6–23)
CALCIUM SERPL-MCNC: 8.7 MG/DL (ref 8.6–10.2)
CHLORIDE BLD-SCNC: 100 MMOL/L (ref 98–107)
CO2: 23 MMOL/L (ref 22–29)
CREAT SERPL-MCNC: 0.6 MG/DL (ref 0.7–1.2)
CULTURE, RESPIRATORY: NORMAL
EOSINOPHILS ABSOLUTE: 0.12 E9/L (ref 0.05–0.5)
EOSINOPHILS RELATIVE PERCENT: 1 % (ref 0–6)
GFR AFRICAN AMERICAN: >60
GFR NON-AFRICAN AMERICAN: >60 ML/MIN/1.73
GLUCOSE BLD-MCNC: 99 MG/DL (ref 74–99)
HCT VFR BLD CALC: 33.2 % (ref 37–54)
HEMOGLOBIN: 11.9 G/DL (ref 12.5–16.5)
IMMATURE GRANULOCYTES #: 0.11 E9/L
IMMATURE GRANULOCYTES %: 1 % (ref 0–5)
LV EF: 60 %
LV EF: 65 %
LVEF MODALITY: NORMAL
LVEF MODALITY: NORMAL
LYMPHOCYTES ABSOLUTE: 1.38 E9/L (ref 1.5–4)
LYMPHOCYTES RELATIVE PERCENT: 12 % (ref 20–42)
MAGNESIUM: 1.9 MG/DL (ref 1.6–2.6)
MCH RBC QN AUTO: 32.2 PG (ref 26–35)
MCHC RBC AUTO-ENTMCNC: 35.8 % (ref 32–34.5)
MCV RBC AUTO: 90 FL (ref 80–99.9)
MONOCYTES ABSOLUTE: 1.61 E9/L (ref 0.1–0.95)
MONOCYTES RELATIVE PERCENT: 14 % (ref 2–12)
NEUTROPHILS ABSOLUTE: 8.28 E9/L (ref 1.8–7.3)
NEUTROPHILS RELATIVE PERCENT: 71.7 % (ref 43–80)
PDW BLD-RTO: 14.6 FL (ref 11.5–15)
PHOSPHORUS: 3.7 MG/DL (ref 2.5–4.5)
PLATELET # BLD: 452 E9/L (ref 130–450)
PMV BLD AUTO: 8.7 FL (ref 7–12)
POLYCHROMASIA: ABNORMAL
POTASSIUM REFLEX MAGNESIUM: 4.3 MMOL/L (ref 3.5–5)
POTASSIUM SERPL-SCNC: 4.3 MMOL/L (ref 3.5–5)
RBC # BLD: 3.69 E12/L (ref 3.8–5.8)
SMEAR, RESPIRATORY: NORMAL
SODIUM BLD-SCNC: 133 MMOL/L (ref 132–146)
TOTAL PROTEIN: 6.6 G/DL (ref 6.4–8.3)
WBC # BLD: 11.5 E9/L (ref 4.5–11.5)

## 2022-08-30 PROCEDURE — 36415 COLL VENOUS BLD VENIPUNCTURE: CPT

## 2022-08-30 PROCEDURE — 6360000002 HC RX W HCPCS: Performed by: INTERNAL MEDICINE

## 2022-08-30 PROCEDURE — 2140000000 HC CCU INTERMEDIATE R&B

## 2022-08-30 PROCEDURE — 93016 CV STRESS TEST SUPVJ ONLY: CPT | Performed by: INTERNAL MEDICINE

## 2022-08-30 PROCEDURE — 83735 ASSAY OF MAGNESIUM: CPT

## 2022-08-30 PROCEDURE — 6370000000 HC RX 637 (ALT 250 FOR IP): Performed by: INTERNAL MEDICINE

## 2022-08-30 PROCEDURE — 85025 COMPLETE CBC W/AUTO DIFF WBC: CPT

## 2022-08-30 PROCEDURE — A9500 TC99M SESTAMIBI: HCPCS | Performed by: RADIOLOGY

## 2022-08-30 PROCEDURE — 93017 CV STRESS TEST TRACING ONLY: CPT

## 2022-08-30 PROCEDURE — 93018 CV STRESS TEST I&R ONLY: CPT | Performed by: INTERNAL MEDICINE

## 2022-08-30 PROCEDURE — 99232 SBSQ HOSP IP/OBS MODERATE 35: CPT

## 2022-08-30 PROCEDURE — 84100 ASSAY OF PHOSPHORUS: CPT

## 2022-08-30 PROCEDURE — 80048 BASIC METABOLIC PNL TOTAL CA: CPT

## 2022-08-30 PROCEDURE — 80053 COMPREHEN METABOLIC PANEL: CPT

## 2022-08-30 PROCEDURE — 2700000000 HC OXYGEN THERAPY PER DAY

## 2022-08-30 PROCEDURE — 6370000000 HC RX 637 (ALT 250 FOR IP): Performed by: FAMILY MEDICINE

## 2022-08-30 PROCEDURE — 2500000003 HC RX 250 WO HCPCS: Performed by: INTERNAL MEDICINE

## 2022-08-30 PROCEDURE — 78452 HT MUSCLE IMAGE SPECT MULT: CPT

## 2022-08-30 PROCEDURE — 99233 SBSQ HOSP IP/OBS HIGH 50: CPT | Performed by: INTERNAL MEDICINE

## 2022-08-30 PROCEDURE — 3430000000 HC RX DIAGNOSTIC RADIOPHARMACEUTICAL: Performed by: RADIOLOGY

## 2022-08-30 PROCEDURE — 2580000003 HC RX 258: Performed by: INTERNAL MEDICINE

## 2022-08-30 PROCEDURE — 93306 TTE W/DOPPLER COMPLETE: CPT

## 2022-08-30 PROCEDURE — 78452 HT MUSCLE IMAGE SPECT MULT: CPT | Performed by: INTERNAL MEDICINE

## 2022-08-30 RX ORDER — DOXYCYCLINE HYCLATE 100 MG/1
100 CAPSULE ORAL EVERY 12 HOURS SCHEDULED
Status: DISCONTINUED | OUTPATIENT
Start: 2022-08-30 | End: 2022-08-31 | Stop reason: HOSPADM

## 2022-08-30 RX ADMIN — OXYCODONE AND ACETAMINOPHEN 1 TABLET: 5; 325 TABLET ORAL at 01:27

## 2022-08-30 RX ADMIN — ATORVASTATIN CALCIUM 40 MG: 40 TABLET, FILM COATED ORAL at 12:47

## 2022-08-30 RX ADMIN — ENOXAPARIN SODIUM 30 MG: 100 INJECTION SUBCUTANEOUS at 19:48

## 2022-08-30 RX ADMIN — Medication 10 ML: at 12:47

## 2022-08-30 RX ADMIN — OXYCODONE AND ACETAMINOPHEN 1 TABLET: 5; 325 TABLET ORAL at 08:20

## 2022-08-30 RX ADMIN — DOXYCYCLINE HYCLATE 100 MG: 100 CAPSULE ORAL at 19:48

## 2022-08-30 RX ADMIN — OXYCODONE AND ACETAMINOPHEN 1 TABLET: 5; 325 TABLET ORAL at 14:47

## 2022-08-30 RX ADMIN — AMLODIPINE BESYLATE 5 MG: 5 TABLET ORAL at 12:46

## 2022-08-30 RX ADMIN — Medication 11.3 MILLICURIE: at 08:35

## 2022-08-30 RX ADMIN — Medication 36 MILLICURIE: at 09:50

## 2022-08-30 RX ADMIN — ENOXAPARIN SODIUM 30 MG: 100 INJECTION SUBCUTANEOUS at 12:46

## 2022-08-30 RX ADMIN — OXYCODONE AND ACETAMINOPHEN 1 TABLET: 5; 325 TABLET ORAL at 20:53

## 2022-08-30 RX ADMIN — Medication 10 ML: at 19:49

## 2022-08-30 RX ADMIN — DOXYCYCLINE 100 MG: 100 INJECTION, POWDER, LYOPHILIZED, FOR SOLUTION INTRAVENOUS at 12:57

## 2022-08-30 RX ADMIN — REGADENOSON 0.4 MG: 0.08 INJECTION, SOLUTION INTRAVENOUS at 09:50

## 2022-08-30 RX ADMIN — CEFTRIAXONE 1000 MG: 1 INJECTION, POWDER, FOR SOLUTION INTRAMUSCULAR; INTRAVENOUS at 12:46

## 2022-08-30 ASSESSMENT — PAIN DESCRIPTION - DESCRIPTORS
DESCRIPTORS: ACHING;SHOOTING;CRAMPING
DESCRIPTORS: SHARP;SHOOTING;DISCOMFORT

## 2022-08-30 ASSESSMENT — PAIN SCALES - GENERAL
PAINLEVEL_OUTOF10: 7
PAINLEVEL_OUTOF10: 6
PAINLEVEL_OUTOF10: 7
PAINLEVEL_OUTOF10: 9
PAINLEVEL_OUTOF10: 0

## 2022-08-30 ASSESSMENT — PAIN DESCRIPTION - LOCATION
LOCATION: OTHER (COMMENT)
LOCATION: ABDOMEN;FLANK
LOCATION: RIB CAGE

## 2022-08-30 ASSESSMENT — PAIN DESCRIPTION - ORIENTATION
ORIENTATION: LEFT
ORIENTATION: LEFT
ORIENTATION: RIGHT

## 2022-08-30 NOTE — PROGRESS NOTES
Perfusion imaging reviewed and demonstrates no evidence of perfusion at base with normal left ventricular systolic function suggesting patient is an appropriate candidate for medical management as well as an acceptable candidate for endovascular repair of his abdominal aortic aneurysm. Awaiting results of biopsy of endobronchial lesion to guide additional management recommendations and potential prognostic considerations. From a cardiovascular standpoint, no additional assessment is indicated with recommended ongoing medical management as presently outlined with needs of risk factor modification to reduce risk of progressive atherosclerosis. We will further evaluate during the course of hospitalization should additional cardiovascular difficulties or concerns arise with recommended follow-up with the [facility] and will provide additional assistance as requested at the time of his aneurysm resection.

## 2022-08-30 NOTE — ANESTHESIA POSTPROCEDURE EVALUATION
Department of Anesthesiology  Postprocedure Note    Patient: Paris Lefort  MRN: [id number]  YOB: 1955  Date of evaluation: 8/30/2022      Procedure Summary     Date: 08/29/22 Room / Location: [facility] 03 / [facility]    Anesthesia Start: 4497 Anesthesia Stop: 1500    Procedure: BRONCHOSCOPY BIOPSY BRONCHUS Diagnosis:       Atelectasis      (Atelectasis [J98.11])    Surgeons: Siomara Cody MD Responsible Provider: Juan Alberto Combs MD    Anesthesia Type: MAC ASA Status: 3          Anesthesia Type: No value filed. Mayito Phase I: Mayito Score: 10    Mayito Phase II:        Anesthesia Post Evaluation    Patient location during evaluation: PACU  Patient participation: complete - patient participated  Level of consciousness: awake and alert  Airway patency: patent  Nausea & Vomiting: no nausea and no vomiting  Complications: no  Cardiovascular status: hemodynamically stable  Respiratory status: acceptable  Hydration status: euvolemic  There was medical reason for not using a multimodal analgesia pain management approach.

## 2022-08-30 NOTE — PROGRESS NOTES
Vascular Surgery Progress Note    Pt is being seen in f/u today regarding AAA 5.5 cm    Subjective  Pt s/e. Sitting up at the edge of the bed. Legs are swollen because he hasn't been walking much and has had them dangling over the side of the bed. He denies any pain in the abdomen or back.      Current Medications:    sodium chloride 75 mL/hr at 08/29/22 0835    sodium chloride        perflutren lipid microspheres, sodium chloride flush, sodium chloride, promethazine **OR** ondansetron, polyethylene glycol, acetaminophen **OR** acetaminophen, oxyCODONE-acetaminophen, fentanNYL    atorvastatin  40 mg Oral Daily    amLODIPine  5 mg Oral Daily    sodium chloride flush  10 mL IntraVENous 2 times per day    enoxaparin  30 mg SubCUTAneous BID    cefTRIAXone (ROCEPHIN) IV  1,000 mg IntraVENous Q24H    doxycycline (VIBRAMYCIN) IV  100 mg IntraVENous Q12H      PHYSICAL EXAM:    BP (!) 152/98   Pulse 90   Temp 98.4 °F (36.9 °C) (Oral)   Resp 18   Ht 6' 1\" (1.854 m)   Wt 230 lb (104.3 kg)   SpO2 100%   BMI 30.34 kg/m²   No intake or output data in the 24 hours ending 08/30/22 1220       Gen Awake, alert, oriented x3, in no apparent distress   CVS S1S2   Resp No increased work of breathing   Abd Soft, non-tender, non-distended   R LE DP/PT biphasic, fem 2+   2+ edema, strength 5/5  L LE DP/PT biphasic, fem 2+   2+ edema, strength 5/5    LABS:    Lab Results   Component Value Date    WBC 11.5 08/30/2022    HGB 11.9 (L) 08/30/2022    HCT 33.2 (L) 08/30/2022     (H) 08/30/2022    PROTIME 13.5 (H) 08/27/2022    INR 1.2 08/27/2022    APTT 37.8 (H) 08/27/2022    K 4.3 08/30/2022    K 4.3 08/30/2022    BUN 7 08/30/2022    CREATININE 0.6 (L) 08/30/2022     A/P PNA with loculated effusion, Assx 5.1 cm AAA  Continue statin, start plavix soon  Emphasized importance of tobacco cessation  S/p bronchoscopy with biopsy 8/29  Hyponatremia: Na 133 - resolved  Abx per primary: IV rocephin and doxy  Discussed with patient symptoms of abdominal pain, back pain and need to go to ER immediately if any symptoms developed  Will schedule EVAR as an outpatient once acute issues resolved  Per Cardiology will need Stress test and ECHO prior to EVAR  Stress test done today    BRITTNEY Soni - CNP

## 2022-08-30 NOTE — PROGRESS NOTES
[facility]  [address] [facility], [address], [address] [facility]      Pulmonary Progress Note      SUBJECTIVE:  sitting up at side of bed on 2 li NC, claims doing some better with breathing and coughing less since after bronchoscopy.      OBJECTIVE    Medications    Continuous Infusions:   sodium chloride 75 mL/hr at 22 0835    sodium chloride         Scheduled Meds:   doxycycline hyclate  100 mg Oral 2 times per day    atorvastatin  40 mg Oral Daily    amLODIPine  5 mg Oral Daily    sodium chloride flush  10 mL IntraVENous 2 times per day    enoxaparin  30 mg SubCUTAneous BID    cefTRIAXone (ROCEPHIN) IV  1,000 mg IntraVENous Q24H       PRN Meds:perflutren lipid microspheres, sodium chloride flush, sodium chloride, promethazine **OR** ondansetron, polyethylene glycol, acetaminophen **OR** acetaminophen, oxyCODONE-acetaminophen, fentanNYL    Physical    VITALS:  BP (!) 132/90   Pulse 87   Temp 98.5 °F (36.9 °C) (Oral)   Resp 20   Ht 6' 1\" (1.854 m)   Wt 230 lb (104.3 kg)   SpO2 98%   BMI 30.34 kg/m²     24HR INTAKE/OUTPUT:      Intake/Output Summary (Last 24 hours) at 2022 1617  Last data filed at 2022 1030  Gross per 24 hour   Intake 0 ml   Output --   Net 0 ml       24HR PULSE OXIMETRY RANGE:    SpO2  Av %  Min: 94 %  Max: 100 %    General appearance: alert, appears stated age, and cooperative  Lungs: rhonchi bibasilar minimal  Heart: regular rate and rhythm, S1, S2 normal, no murmur, click, rub or gallop  Abdomen: soft, non-tender; bowel sounds normal; no masses,  no organomegaly  Extremities: edema bipedal  Neurologic: Mental status: Alert, oriented, thought content appropriate    Data    CBC:   Recent Labs     22   WBC 12.9* 12.0* 11.5   HGB 13.1 12.4* 11.9*   HCT 38.7 33.8* 33.2*   MCV 86.2 90.1 90.0    430 452*       BMP:  Recent Labs     22 08/30/22  0413   * 129* 133   K 4.5 4.6  4.6 4.3  4.3   CL 93* 95* 100   CO2 21* 24 23   PHOS  --  3.5 3.7   BUN 9 9 7   CREATININE 0.7 0.7 0.6*    ALB:3,BILIDIR:3,BILITOT:3,ALKPHOS:3)@    PT/INR: No results for input(s): PROTIME, INR in the last 72 hours. ABG:   No results for input(s): PH, PO2, PCO2, HCO3, BE, O2SAT, METHB, O2HB, COHB, O2CON, HHB, THB in the last 72 hours. Radiology/Other tests reviewed: none    Assessment:     Principal Problem:    Pneumonia  Active Problems:    Encounter for preoperative assessment for noncoronary cardiac surgery    AAA (abdominal aortic aneurysm) without rupture (HCC)    Primary hypertension    Loculated pleural effusion    Atelectasis  Resolved Problems:    * No resolved hospital problems. *      Plan:       Await biopsy results on endobronchial mass, should be able to ffup with this as out-pt, discussed with pt, may be more malignant than benign at this point  Cont with oxygen use, will need to qualify for home use prior to discharge, discussed with nurse  No lung medications, observe, can get CELESTE MDI prn if wants to, doesn't appear may need it anyway as doing better while here  OOB to chair, ambulate with assistance      Time at the bedside, reviewing labs and radiographs, reviewing notes and consultations, discussing with staff and family was more than 35 minutes. Thanks for letting us see this patient in consultation. Please contact us with any questions. Office [phone] or after hours through [employer], x [phone].
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
## 2022-08-30 NOTE — PROCEDURES
Lexiscan Stress EKG Report:    Dx CP    Baseline EKG: normal sinus rhythm, nonspecific ST and T waves changes. Stress EKG: No ST-T changes. Arrhythmias: None. Symptoms: None. Summary:  Unremarkable lexiscan stress EKG. See separate report for stress perfusion results. Narendra Rahman D.O.   Cardiologist  Cardiology, [facility]

## 2022-08-30 NOTE — PROGRESS NOTES
Notification of IV to PO conversion: This patient's order for doxycycline IV has been changed to PO as approved by the Pharmacy & Therapeutics and Medical Executive Committees. If the patient should become strict NPO while on this therapy, contact the prescriber for further orders.

## 2022-08-30 NOTE — PROGRESS NOTES
[facility] MD Marika Heller, MD Enid Dozier, FELICIANO Holt, CAROLE  Progress Note    8/29/2022    SUBJECTIVE:   8/29: Off the floor at the time of my visit this afternoon, and endoscopy      PROBLEM LIST:    Principal Problem:    Pneumonia  Active Problems:    Encounter for preoperative assessment for noncoronary cardiac surgery    Abdominal aortic aneurysm (AAA) without rupture ([address]    Primary hypertension    Loculated pleural effusion  Resolved Problems:    * No resolved hospital problems. *         DIET:    Diet NPO Exceptions are: Sips of Water with Meds  ADULT DIET;  Regular; Low Fat/Low Chol/High Fiber/PETROS     MEDS (scheduled):    atorvastatin  40 mg Oral Daily    amLODIPine  5 mg Oral Daily    sodium chloride flush  10 mL IntraVENous 2 times per day    enoxaparin  30 mg SubCUTAneous BID    cefTRIAXone (ROCEPHIN) IV  1,000 mg IntraVENous Q24H    doxycycline (VIBRAMYCIN) IV  100 mg IntraVENous Q12H       MEDS (infusions):   sodium chloride 75 mL/hr at 08/29/22 0835    sodium chloride         MEDS (prn):  regadenoson, perflutren lipid microspheres, sodium chloride flush, sodium chloride, promethazine **OR** ondansetron, polyethylene glycol, acetaminophen **OR** acetaminophen, oxyCODONE-acetaminophen, fentanNYL    PHYSICAL EXAM:     Patient Vitals for the past 24 hrs:   BP Temp Temp src Pulse Resp SpO2   08/29/22 1530 (!) 141/72 -- -- 81 24 93 %   08/29/22 1515 (!) 135/94 -- -- 88 24 99 %   08/29/22 1459 (!) 142/99 97.5 °F (36.4 °C) -- 85 20 95 %   08/29/22 0834 131/75 98.8 °F (37.1 °C) -- 71 16 95 %   08/29/22 0552 -- -- -- -- 20 --   08/29/22 0115 -- -- -- -- -- 93 %   08/29/22 0100 (!) 129/93 98.4 °F (36.9 °C) Oral 84 18 (!) 87 %   08/28/22 2346 -- -- -- -- 16 --   @      Intake/Output Summary (Last 24 hours) at 8/29/2022 2015  Last data filed at 8/28/2022 2307  Gross per 24 hour   Intake 0 ml   Output --   Net 0 ml         Wt Readings from Last 3 Encounters:   08/28/22 230 lb (104.3 kg)   08/27/22 230 lb (104.3 kg)   01/16/15 210 lb (95.3 kg)     PE  Off the floor at the time of my visit this afternoon, and endoscopy      DATA:    Recent Labs     08/27/22  0814 08/28/22 0417 08/29/22 0413   WBC 13.6* 12.9* 12.0*   HGB 14.7 13.1 12.4*   HCT 42.5 38.7 33.8*   MCV 87.4 86.2 90.1    437 430     Recent Labs     08/27/22  0814 08/28/22  0417 08/28/22 0417 08/28/22  1349 08/28/22 1952 08/29/22 0413    127*  --  127* 128* 129*   K 4.0 4.1   < > 4.4 4.5 4.6  4.6   CL 97* 95*  --  92* 93* 95*   CO2 23 21*  --  22 21* 24   MG  --   --   --   --   --  1.8   PHOS  --   --   --   --   --  3.5   BUN 8 9  --  9 9 9   CREATININE 0.7 0.7  --  0.7 0.7 0.7   ALT 16 12  --   --   --  16   AST 18 12  --   --   --  24   BILITOT 0.5 0.4  --   --   --  0.4   ALKPHOS 64 70  --   --   --  55    < > = values in this interval not displayed. Lab Results   Component Value Date    LABPROT 0.2 08/28/2022    LABPROT 0.2 08/28/2022       Assessment  Hyponatremia, hypoosmolar, euvolemic versus mildly hypovolemic. Urinary indices are consistent with hypovolemic hyponatremia. Urine osmolality is pending at the time of this dictation.   Given the pneumonia and interstitial infiltration, consider an SIADH-like syndrome, though doubt it     Improved on IV normal saline drip    Recommendations  Continue NS drip at conservative rate  Follow labs, UO  No SSRIs or thiazides or NSAIDs while here  Continue supportive care         Electronically signed by Claudette Belt, MD on 8/29/2022

## 2022-08-30 NOTE — PROGRESS NOTES
Hospitalist Progress Note      SYNOPSIS: Patient admitted on 2022 for Pneumonia    Mr. Khoi Ho, a 74y.o. year old male  who  has a past medical history of Hepatitis C, Hyperlipidemia, Hypertension, and Intracranial hemorrhage ([facility] [address] Patient transferred from [facility] where he presented with chest pain that been ongoing for 3 days. Worse with inspiration. EKG did not show any acute changes. Work-up revealed the patient had left-sided infiltrate and effusion as well as left upper lobe collapse. Also large loculated pleural effusion. Patient was started on azithromycin and Rocephin. Patient reported severe tearing chest pain. CTA revealed no evidence of dissection but did show an incidentally identified 5.5 cm infrarenal abdominal aortic aneurysm. Patient was then transferred to [facility] for further evaluation by vascular surgery and pulmonology. SUBJECTIVE:    Patient assessed at bedside, alert, oriented, answering questions appropriately  Bronchoscopy completed yesterday, biopsy samples pending  Exercise stress test completed today  Patient for echo today possibly  Patient on supplemental oxygen during assessment, states he does not need it but is using it since its there. Sodium improving 129 -->133          Stable overnight. No other overnight issues reported. Temp (24hrs), Av.9 °F (36.6 °C), Min:97.4 °F (36.3 °C), Max:98.9 °F (37.2 °C)    DIET: Diet NPO Exceptions are: Sips of Water with Meds  CODE: Full Code  No intake or output data in the 24 hours ending 22 0844      OBJECTIVE:    /84   Pulse 84   Temp 98.9 °F (37.2 °C) (Oral)   Resp 20   Ht 6' 1\" (1.854 m)   Wt 230 lb (104.3 kg)   SpO2 100%   BMI 30.34 kg/m²     General appearance: No apparent distress, appears stated age and cooperative. HEENT:  Conjunctivae/corneas clear. Neck: Supple. No jugular venous distention.    Respiratory: Diminished to auscultation bilateral lower bases and left upper lobe, normal respiratory effort  Cardiovascular: Regular rate rhythm, normal S1-S2  Abdomen: Soft, nontender, nondistended  Musculoskeletal: No clubbing, cyanosis, no bilateral lower extremity edema. Brisk capillary refill. Skin:  No rashes  on visible skin  Neurologic: awake, alert and following commands       Assessment  1. Large left loculated effusion with left upper lung collapse  2. Community-acquired pneumonia  3.  5.5 cm infrarenal abdominal aortic aneurysm  4. Hyponatremia  6. Hypertension    Plan   1. Improvement in sodium to 133 today from 129 yesterday. CT abdomen pelvis ordered by vascular surgery given infrarenal abdominal aortic aneurysm per vascular surgery will likely benefit from stent graft in the future. CT abdomen pelvis and CT thorax with contrast showed cholelithiasis with left upper lobe artery to the left helium with mistakenly feeds central hilar left smoker not excluded recommended bronchoscopy. Loculated left effusion with severe infiltrates in the left lung. 5.5 cm infrarenal abdominal aortic aneurysm. 2. Cardiology was consulted for preoperative cardiac evaluation. Echocardiogram ordered. Stress test completed 8/29 prior to vascular surgery. Nephrology following for hyponatremia which is improving    3. Per pulmonology bronchoscopy completed 8/29 and biopsy samples sent to pathology given concern for malignancy concern left upper lobe lung collapse with possible endobronchial obstruction.           DISPOSITION:     Medications:  REVIEWED DAILY    Infusion Medications    sodium chloride 75 mL/hr at 08/29/22 0835    sodium chloride       Scheduled Medications    atorvastatin  40 mg Oral Daily    amLODIPine  5 mg Oral Daily    sodium chloride flush  10 mL IntraVENous 2 times per day    enoxaparin  30 mg SubCUTAneous BID    cefTRIAXone (ROCEPHIN) IV  1,000 mg IntraVENous Q24H    doxycycline (VIBRAMYCIN) IV  100 mg IntraVENous Q12H     PRN Meds: technetium sestamibi, regadenoson, perflutren lipid microspheres, sodium chloride flush, sodium chloride, promethazine **OR** ondansetron, polyethylene glycol, acetaminophen **OR** acetaminophen, oxyCODONE-acetaminophen, fentanNYL    Labs:     Recent Labs     08/28/22 0417 08/29/22 0413 08/30/22 0413   WBC 12.9* 12.0* 11.5   HGB 13.1 12.4* 11.9*   HCT 38.7 33.8* 33.2*    430 452*       Recent Labs     08/28/22 1952 08/29/22 0413 08/30/22 0413   * 129* 133   K 4.5 4.6  4.6 4.3  4.3   CL 93* 95* 100   CO2 21* 24 23   BUN 9 9 7   CREATININE 0.7 0.7 0.6*   CALCIUM 9.1 8.7 8.7   PHOS  --  3.5 3.7       Recent Labs     08/28/22 0417 08/29/22 0413 08/30/22 0413   PROT 7.4 6.9 6.6   ALKPHOS 70 55 59   ALT 12 16 27   AST 12 24 38   BILITOT 0.4 0.4 0.4       No results for input(s): INR in the last 72 hours. No results for input(s): Luiza Fincastle in the last 72 hours. Chronic labs:    Lab Results   Component Value Date    TSH 4.520 (H) 08/28/2022    INR 1.2 08/27/2022       Radiology: REVIEWED DAILY    +++++++++++++++++++++++++++++++++++++++++++++++++  Ethelyn Schaumann, APRN - CNP  [facility] - [address] [facility], [place]  +++++++++++++++++++++++++++++++++++++++++++++++++  NOTE: This report was transcribed using voice recognition software. Every effort was made to ensure accuracy; however, inadvertent computerized transcription errors may be present.

## 2022-08-30 NOTE — PROGRESS NOTES
PULSE OX ON ROOM AIR SITTING 88%   PULSE OX ON ROOM AIR AMBULATING 74%   PULSE OX ON 3 LITERS SITTING RECOVERY 93%   PULSE OX ON 3 LITERS AMBULATING RECOVERY 90%

## 2022-08-31 VITALS
TEMPERATURE: 98.8 F | OXYGEN SATURATION: 97 % | HEIGHT: 73 IN | WEIGHT: 230 LBS | DIASTOLIC BLOOD PRESSURE: 79 MMHG | SYSTOLIC BLOOD PRESSURE: 152 MMHG | RESPIRATION RATE: 16 BRPM | HEART RATE: 76 BPM | BODY MASS INDEX: 30.48 KG/M2

## 2022-08-31 LAB
ALBUMIN SERPL-MCNC: 2.6 G/DL (ref 3.5–5.2)
ALP BLD-CCNC: 65 U/L (ref 40–129)
ALT SERPL-CCNC: 49 U/L (ref 0–40)
ANION GAP SERPL CALCULATED.3IONS-SCNC: 11 MMOL/L (ref 7–16)
ANISOCYTOSIS: ABNORMAL
AST SERPL-CCNC: 61 U/L (ref 0–39)
BASOPHILS ABSOLUTE: 0.05 E9/L (ref 0–0.2)
BASOPHILS RELATIVE PERCENT: 0.4 % (ref 0–2)
BILIRUB SERPL-MCNC: 0.4 MG/DL (ref 0–1.2)
BUN BLDV-MCNC: 7 MG/DL (ref 6–23)
CALCIUM SERPL-MCNC: 8.8 MG/DL (ref 8.6–10.2)
CHLORIDE BLD-SCNC: 94 MMOL/L (ref 98–107)
CO2: 26 MMOL/L (ref 22–29)
CREAT SERPL-MCNC: 0.6 MG/DL (ref 0.7–1.2)
EOSINOPHILS ABSOLUTE: 0.14 E9/L (ref 0.05–0.5)
EOSINOPHILS RELATIVE PERCENT: 1.2 % (ref 0–6)
GFR AFRICAN AMERICAN: >60
GFR NON-AFRICAN AMERICAN: >60 ML/MIN/1.73
GLUCOSE BLD-MCNC: 99 MG/DL (ref 74–99)
HCT VFR BLD CALC: 35 % (ref 37–54)
HEMOGLOBIN: 12.2 G/DL (ref 12.5–16.5)
IMMATURE GRANULOCYTES #: 0.11 E9/L
IMMATURE GRANULOCYTES %: 0.9 % (ref 0–5)
LYMPHOCYTES ABSOLUTE: 2.06 E9/L (ref 1.5–4)
LYMPHOCYTES RELATIVE PERCENT: 17.4 % (ref 20–42)
MAGNESIUM: 2 MG/DL (ref 1.6–2.6)
MCH RBC QN AUTO: 30 PG (ref 26–35)
MCHC RBC AUTO-ENTMCNC: 34.9 % (ref 32–34.5)
MCV RBC AUTO: 86 FL (ref 80–99.9)
MONOCYTES ABSOLUTE: 1.69 E9/L (ref 0.1–0.95)
MONOCYTES RELATIVE PERCENT: 14.3 % (ref 2–12)
NEUTROPHILS ABSOLUTE: 7.76 E9/L (ref 1.8–7.3)
NEUTROPHILS RELATIVE PERCENT: 65.8 % (ref 43–80)
OVALOCYTES: ABNORMAL
PDW BLD-RTO: 14 FL (ref 11.5–15)
PHOSPHORUS: 3.5 MG/DL (ref 2.5–4.5)
PLATELET # BLD: 513 E9/L (ref 130–450)
PMV BLD AUTO: 8.5 FL (ref 7–12)
POIKILOCYTES: ABNORMAL
POLYCHROMASIA: ABNORMAL
POTASSIUM REFLEX MAGNESIUM: 4.2 MMOL/L (ref 3.5–5)
RBC # BLD: 4.07 E12/L (ref 3.8–5.8)
SODIUM BLD-SCNC: 131 MMOL/L (ref 132–146)
SPHEROCYTES: ABNORMAL
TOTAL PROTEIN: 6.8 G/DL (ref 6.4–8.3)
WBC # BLD: 11.8 E9/L (ref 4.5–11.5)

## 2022-08-31 PROCEDURE — 84100 ASSAY OF PHOSPHORUS: CPT

## 2022-08-31 PROCEDURE — 2580000003 HC RX 258: Performed by: INTERNAL MEDICINE

## 2022-08-31 PROCEDURE — 85025 COMPLETE CBC W/AUTO DIFF WBC: CPT

## 2022-08-31 PROCEDURE — 94729 DIFFUSING CAPACITY: CPT

## 2022-08-31 PROCEDURE — 36415 COLL VENOUS BLD VENIPUNCTURE: CPT

## 2022-08-31 PROCEDURE — 6370000000 HC RX 637 (ALT 250 FOR IP): Performed by: INTERNAL MEDICINE

## 2022-08-31 PROCEDURE — 94060 EVALUATION OF WHEEZING: CPT

## 2022-08-31 PROCEDURE — 94726 PLETHYSMOGRAPHY LUNG VOLUMES: CPT

## 2022-08-31 PROCEDURE — 83735 ASSAY OF MAGNESIUM: CPT

## 2022-08-31 PROCEDURE — 6360000002 HC RX W HCPCS: Performed by: INTERNAL MEDICINE

## 2022-08-31 PROCEDURE — 80053 COMPREHEN METABOLIC PANEL: CPT

## 2022-08-31 RX ORDER — ATORVASTATIN CALCIUM 40 MG/1
40 TABLET, FILM COATED ORAL DAILY
Qty: 30 TABLET | Refills: 3 | Status: SHIPPED | OUTPATIENT
Start: 2022-09-01

## 2022-08-31 RX ORDER — CLOPIDOGREL BISULFATE 75 MG/1
75 TABLET ORAL DAILY
Qty: 30 TABLET | Refills: 0 | Status: SHIPPED | OUTPATIENT
Start: 2022-08-31 | End: 2022-10-21 | Stop reason: SDUPTHER

## 2022-08-31 RX ORDER — DOXYCYCLINE HYCLATE 100 MG/1
100 CAPSULE ORAL EVERY 12 HOURS SCHEDULED
Qty: 6 CAPSULE | Refills: 0 | Status: SHIPPED | OUTPATIENT
Start: 2022-08-31 | End: 2022-09-03

## 2022-08-31 RX ORDER — ALBUTEROL SULFATE 90 UG/1
2 AEROSOL, METERED RESPIRATORY (INHALATION) 4 TIMES DAILY PRN
Qty: 18 G | Refills: 5 | Status: SHIPPED | OUTPATIENT
Start: 2022-08-31

## 2022-08-31 RX ADMIN — CEFTRIAXONE 1000 MG: 1 INJECTION, POWDER, FOR SOLUTION INTRAMUSCULAR; INTRAVENOUS at 13:48

## 2022-08-31 RX ADMIN — AMLODIPINE BESYLATE 5 MG: 5 TABLET ORAL at 09:20

## 2022-08-31 RX ADMIN — DOXYCYCLINE HYCLATE 100 MG: 100 CAPSULE ORAL at 09:21

## 2022-08-31 RX ADMIN — ATORVASTATIN CALCIUM 40 MG: 40 TABLET, FILM COATED ORAL at 09:21

## 2022-08-31 RX ADMIN — OXYCODONE AND ACETAMINOPHEN 1 TABLET: 5; 325 TABLET ORAL at 04:13

## 2022-08-31 RX ADMIN — Medication 10 ML: at 09:21

## 2022-08-31 RX ADMIN — ENOXAPARIN SODIUM 30 MG: 100 INJECTION SUBCUTANEOUS at 09:20

## 2022-08-31 RX ADMIN — OXYCODONE AND ACETAMINOPHEN 1 TABLET: 5; 325 TABLET ORAL at 10:35

## 2022-08-31 ASSESSMENT — PAIN DESCRIPTION - LOCATION
LOCATION: OTHER (COMMENT)
LOCATION: RIB CAGE

## 2022-08-31 ASSESSMENT — PAIN DESCRIPTION - ORIENTATION
ORIENTATION: LEFT
ORIENTATION: LEFT

## 2022-08-31 ASSESSMENT — PAIN - FUNCTIONAL ASSESSMENT: PAIN_FUNCTIONAL_ASSESSMENT: ACTIVITIES ARE NOT PREVENTED

## 2022-08-31 ASSESSMENT — PAIN DESCRIPTION - DESCRIPTORS
DESCRIPTORS: DISCOMFORT
DESCRIPTORS: ACHING

## 2022-08-31 ASSESSMENT — PAIN SCALES - GENERAL
PAINLEVEL_OUTOF10: 5
PAINLEVEL_OUTOF10: 6

## 2022-08-31 NOTE — PROGRESS NOTES
[facility] MD Bethel Connor, MD Kishan Lennon, CNP   Renetta Holt, CAROLE  Progress Note    8/30/2022    SUBJECTIVE:   8/29: Off the floor at the time of my visit this afternoon, and endoscopy    8/30: Seen this afternoon. Feeling much better today. Pain at the procedure site. Good appetite and intake. PROBLEM LIST:    Principal Problem:    Pneumonia  Active Problems:    Encounter for preoperative assessment for noncoronary cardiac surgery    AAA (abdominal aortic aneurysm) without rupture (HCC)    Primary hypertension    Loculated pleural effusion    Atelectasis  Resolved Problems:    * No resolved hospital problems. *         DIET:    ADULT DIET;  Regular; Low Fat/Low Chol/High Fiber/PETROS     MEDS (scheduled):    doxycycline hyclate  100 mg Oral 2 times per day    atorvastatin  40 mg Oral Daily    amLODIPine  5 mg Oral Daily    sodium chloride flush  10 mL IntraVENous 2 times per day    enoxaparin  30 mg SubCUTAneous BID    cefTRIAXone (ROCEPHIN) IV  1,000 mg IntraVENous Q24H       MEDS (infusions):   sodium chloride 75 mL/hr at 08/29/22 0835    sodium chloride         MEDS (prn):  perflutren lipid microspheres, sodium chloride flush, sodium chloride, promethazine **OR** ondansetron, polyethylene glycol, acetaminophen **OR** acetaminophen, oxyCODONE-acetaminophen, fentanNYL    PHYSICAL EXAM:     Patient Vitals for the past 24 hrs:   BP Temp Temp src Pulse Resp SpO2   08/30/22 1937 (!) 158/82 98.2 °F (36.8 °C) Oral -- 18 96 %   08/30/22 1533 (!) 132/90 98.5 °F (36.9 °C) Oral 87 20 98 %   08/30/22 1517 -- -- -- -- 16 --   08/30/22 1215 (!) 152/98 98.4 °F (36.9 °C) Oral 90 18 100 %   08/30/22 0748 137/84 98.9 °F (37.2 °C) Oral 84 20 100 %   08/30/22 0157 -- -- -- -- 20 --   08/29/22 2145 (!) 140/67 97.4 °F (36.3 °C) -- 91 20 94 %   @      Intake/Output Summary (Last 24 hours) at 8/30/2022 2125  Last data filed at 8/30/2022 1030  Gross per 24 hour Intake 0 ml   Output --   Net 0 ml         Wt Readings from Last 3 Encounters:   08/28/22 230 lb (104.3 kg)   08/27/22 230 lb (104.3 kg)   01/16/15 210 lb (95.3 kg)     PE  in no apparent distress  NC/AT EOMI sclera and conjunctiva clear and anicteric mucous membranes are moist  Neck soft supple trachea midline no bruit no JVD  Coarse breath sounds, CTA bilaterally, except decreased BS on left side  Regular rhythm normal S1 and S2 no murmur no rub  Abdomen soft nontender nondistended normal active bowel sounds  Distal extremities are without edema  No rash or lesion on visible portions of integument  Pulses 2-3+ x4  Moves all 4 extremities  Cranial nerves II through XII grossly intact  Awake, alert, interactive and appropriate    DATA:    Recent Labs     08/28/22 0417 08/29/22 0413 08/30/22 0413   WBC 12.9* 12.0* 11.5   HGB 13.1 12.4* 11.9*   HCT 38.7 33.8* 33.2*   MCV 86.2 90.1 90.0    430 452*     Recent Labs     08/28/22  0417 08/28/22  1349 08/28/22  1952 08/29/22 0413 08/30/22  0413   *   < > 128* 129* 133   K 4.1   < > 4.5 4.6  4.6 4.3  4.3   CL 95*   < > 93* 95* 100   CO2 21*   < > 21* 24 23   MG  --   --   --  1.8 1.9   PHOS  --   --   --  3.5 3.7   BUN 9   < > 9 9 7   CREATININE 0.7   < > 0.7 0.7 0.6*   ALT 12  --   --  16 27   AST 12  --   --  24 38   BILITOT 0.4  --   --  0.4 0.4   ALKPHOS 70  --   --  55 59    < > = values in this interval not displayed. Lab Results   Component Value Date    LABPROT 0.2 08/28/2022    LABPROT 0.2 08/28/2022       Assessment  Hyponatremia, hypoosmolar, euvolemic versus mildly hypovolemic. Urinary indices are consistent with hypovolemic hyponatremia. Urine osmolality is pending at the time of this dictation.   Given the pneumonia and interstitial infiltration, consider an SIADH-like syndrome, though doubt it     Improved on IV normal saline drip    Recommendations  Stop IV fluid  Encourage oral intake  Follow labs, UO  No SSRIs or thiazides or NSAIDs while here  Continue supportive care         Electronically signed by Anaya Easton MD on 8/30/2022

## 2022-08-31 NOTE — CARE COORDINATION
Care Coordination: Discussed in nursing rounds. Will need ambulating pulse ox to determine home going needs. Plan for dc today. Will need DME orders and choice. Pfts should not hold dc, can have done as outpt as well per NP    Meredith Dies OX ON ROOM AIR SITTING____%  PULSE OX ON ROOM AIR AMBULATING ____%  PULSE OX ON ____LITERS SITTING RECOVERY ____%  PULSE OX ON ____LITERS AMBULATING RECOVERY ___%  RN/ Please document Pulse Ox in PG note the following way;(Cut and Paste, Fill in Blanks). If home o2 is needed.   If > 4 ltr needed to recover, please show insignificant recovery on 4 ltr and then increase for recovery rate Patient given results of semen analysis-no sperm. Patient also requesting referral for his Bactrim. Refill sent.

## 2022-08-31 NOTE — DISCHARGE SUMMARY
Hospitalist Discharge Summary    Patient ID: Kemar Goetz   Patient : 1955  Patient's PCP: No primary care provider on file. Admit Date: 2022   Admitting Physician: Reginald Mcmahon MD    Discharge Date:  2022   Discharge Physician: BRITTNEY Joyner CNP   Discharge Condition: Stable  Discharge Disposition: [facility] course in brief:  Mr. Kemar Goetz, a 74y.o. year old male  who  has a past medical history of Hepatitis C, Hyperlipidemia, Hypertension, and Intracranial hemorrhage ([facility] [address] Patient transferred from [address] [place] [address] where he presented with chest pain that been ongoing for 3 days. Worse with inspiration. EKG did not show any acute changes. Work-up revealed the patient had left-sided infiltrate and effusion as well as left upper lobe collapse. Also large loculated pleural effusion. Patient was started on azithromycin and Rocephin. Patient reported severe tearing chest pain. CTA revealed no evidence of dissection but did show an incidentally identified 5.5 cm infrarenal abdominal aortic aneurysm. Patient was then transferred to [facility] for further evaluation by vascular surgery and pulmonology. Patient remained without complaints of dyspnea throughout admission, intermittently using supplemental nasal cannula oxygen \"because its there. \"  Patient tolerated bronchoscopy well. Pulmonology input is for the patient to go home on supplemental oxygen continuously. Patient ordered 3LPM o2 NC continuous DME at home. Patient to initiate plavix 75mg daily tomorrow , and complete the regimen of doxycycline. Patient to follow up with PCP as soon as possible and pulmonology to discuss biopsy results.             Consults:   IP CONSULT TO VASCULAR SURGERY  IP CONSULT TO PULMONOLOGY  IP CONSULT TO NEPHROLOGY  IP CONSULT TO CARDIOLOGY    Discharge Diagnoses:  Large left loculated effusion with left upper lung collapse  Community acquired pneumonia  5.5 cm infrarenal abdominal aortic aneurysm  Hyponatremia  Hypertension       Discharge Instructions / Follow up: Follow-up with PCP within 1 week of discharge. Follow-up with consultants as indicated by them. Compliance with medications as prescribed on discharge. Future Appointments   Date Time Provider Neeraj Bedolla   10/10/2022  1:45 PM Jacques Daily MD [facility]       The patient's condition is stable. At this time the patient is without objective evidence of an acute process requiring continuing hospitalization or inpatient management. They are stable for discharge with outpatient follow-up. I have spoken with the patient and discussed the results of the current hospitalization, in addition to providing specific details for the plan of care and counseling regarding the diagnosis and prognosis. The plan has been discussed in detail and they are aware of the specific conditions for emergent return, as well as the importance of follow-up. Their questions are answered at this time and they are agreeable with the plan for discharge to home. Continued appropriate risk factor modification of blood pressure, diabetes and serum lipids will remain essential to reducing risk of future atherosclerotic development    Activity: activity as tolerated    Physical exam:  General appearance: No apparent distress, appears stated age and cooperative. HEENT: Conjunctivae/corneas clear. Mucous membranes moist.  Neck: Supple. No JVD. Respiratory:  Diminished to auscultation bilaterally. Normal respiratory effort. Cardiovascular:  RRR. S1, S2 without MRG. PV: Pulses palpable. No edema. Abdomen: Soft, non-tender, non-distended. +BS  Musculoskeletal: No obvious deformities. Skin: Normal skin color. No rashes or lesions. Good turgor. Neurologic:  Grossly non-focal. Awake, alert, following commands.    Psychiatric: Alert and oriented, thought content appropriate, normal insight and judgement    Significant labs:  CBC:   Recent Labs     08/29/22 0413 08/30/22 0413 08/31/22 0409   WBC 12.0* 11.5 11.8*   RBC 3.75* 3.69* 4.07   HGB 12.4* 11.9* 12.2*   HCT 33.8* 33.2* 35.0*   MCV 90.1 90.0 86.0   RDW 14.5 14.6 14.0    452* 513*     BMP:   Recent Labs     08/29/22 0413 08/30/22 0413 08/31/22 0409   * 133 131*   K 4.6  4.6 4.3  4.3 4.2   CL 95* 100 94*   CO2 24 23 26   BUN 9 7 7   CREATININE 0.7 0.6* 0.6*   MG 1.8 1.9 2.0   PHOS 3.5 3.7 3.5     LFT:  Recent Labs     08/29/22 0413 08/30/22 0413 08/31/22 0409   PROT 6.9 6.6 6.8   ALKPHOS 55 59 65   ALT 16 27 49*   AST 24 38 61*   BILITOT 0.4 0.4 0.4     PT/INR: No results for input(s): INR, APTT in the last 72 hours. BNP: No results for input(s): BNP in the last 72 hours.   Hgb A1C: No results found for: LABA1C  Folate and B12: No results found for: FLQELSYL57, No results found for: FOLATE  Thyroid Studies:   Lab Results   Component Value Date    TSH 4.520 (H) 08/28/2022       Urinalysis:    Lab Results   Component Value Date/Time    NITRU POSITIVE 04/01/2013 09:30 AM    WBCUA >20 04/01/2013 09:30 AM    BACTERIA MANY 04/01/2013 09:30 AM    RBCUA 10-20 04/01/2013 09:30 AM    BLOODU LARGE 04/01/2013 09:30 AM    SPECGRAV 1.015 04/01/2013 09:30 AM    GLUCOSEU NEGATIVE 04/01/2013 09:30 AM       Imaging:  Echo Complete    Result Date: 8/31/2022  Transthoracic Echocardiography Report (TTE)  Demographics   Patient Name    Lauro Spaulding Gender            Male                  R   Medical Record  [id number]      Room Number       6402  Number   Account #       [de-identified]     Procedure Date    08/30/2022   Corporate ID                  Ordering          Dao Jeffries MD                                Physician   Accession       [id number]    Referring  Number                        Physician   Date of Birth   1955 [address] [facility]   Age             74 year(s)    Interpreting      [address] Physician         Physician Cardiology                                                  Leon Chavez MD                                 Any Other  Procedure Type of Study   TTE procedure:Echo Complete W/Doppler & Color Flow. Procedure Date Date: 08/30/2022 Start: 04:01 PM Study Location: Portable Technical Quality: Adequate visualization Indications:Left ventricle systolic dysfunction. Patient Status: Routine Height: 73 inches Weight: 230 pounds BSA: 2.28 m^2 BMI: 30.34 kg/m^2 BP: 140/67 mmHg  Findings   Left Ventricle  Mildly dilated left ventricle. Mild left ventricular concentric hypertrophy noted. No regional wall motion abnormalities seen. Normal left ventricular ejection fraction. Ejection fraction is visually estimated at 65%. Indeterminate diastolic function. Right Ventricle  The right ventricle was not clearly visualized. Mildly dilated right ventricle. Right ventricle global systolic function is normal .   Left Atrium  The left atrium is mildly dilated. Interatrial septum appears intact. Right Atrium  Mildly enlarged right atrium size. Mitral Valve  Normal mitral valve structure and function. Tricuspid Valve  The tricuspid valve was not well visualized. Aortic Valve  The aortic valve leaflets were not well visualized. Mild aortic regurgitation is noted. Pulmonic Valve  The pulmonic valve was not well visualized. Pericardial Effusion  No evidence of pericardial effusion. Aorta  Aortic root dimension within normal limits. Dilation of the ascending aorta. Conclusions   Summary  Technically suboptimal and limited study. Mildly dilated left ventricle. Mild left ventricular concentric hypertrophy noted. No regional wall motion abnormalities seen. Normal left ventricular ejection fraction. The left atrium is mildly dilated. Mildly dilated right ventricle. Mildly enlarged right atrium size. Mild aortic regurgitation is noted.   Dilation of the ascending aorta.   Signature   ----------------------------------------------------------------  Electronically signed by Shantell Hammer MD(Interpreting  physician) on 08/31/2022 05:27 AM  ----------------------------------------------------------------  M-Mode/2D Measurements & Calculations   LV Diastolic    LV Systolic Dimension: 3.8   AV Cusp Separation: 2 cmLA  Dimension: 5.9  cm                           Dimension: 4.5 cmAO Root  cm              LV Volume Diastolic: 226 ml  Dimension: 3.7 cm  LV FS:35.6 %    LV Volume Systolic: 44.5 ml  LV PW           LV EDV/LV EDV Index: 980  Diastolic: 1.2  UD/44 QQ/U^0LN ESV/LV ESV  cm              Index: 62.8 ml/28ml/ m^2     RV Diastolic Dimension: 3.5  Septum          EF Calculated: 64.3 %        cm  Diastolic: 1.2  LV Mass Index: 134 l/min*m^2  cm                                           Ascending Aorta: 3.9 cm                                               LA volume/Index: 87.2 ml  LV Mass: 305.45 LVOT: 2.1 cm                 /38.1ml/m^2  g                                            RA Area: 21.8 cm^2  Doppler Measurements & Calculations   MV Peak E-Wave: 1.1  AV Peak Velocity:     LVOT Peak Velocity: 1.61 m/s  m/s                  2.05 m/s              LVOT Mean Velocity: 0.99 m/s  MV Peak A-Wave: 0.86 AV Peak Gradient:     LVOT Peak Gradient: 10.4  m/s                  16.83 mmHg            mmHgLVOT Mean Gradient: 4.6  MV E/A Ratio: 1.28   AV Mean Velocity:     mmHg  MV Peak Gradient:    1.29 m/s              Estimated RVSP: 38.8 mmHg  5.9 mmHg             AV Mean Gradient: 7.7 Estimated RAP:3 mmHg  MV Mean Gradient:    mmHg  3.1 mmHg             AV VTI: 31.6 cm  MV Mean Velocity:    AV Area               TR Velocity:2.99 m/s  0.84 m/s             (Continuity):2.93     TR Gradient:35.83 mmHg  MV Deceleration      cm^2                  PV Peak Velocity: 1.23 m/s  Time: 174.6 msec                           PV Peak Gradient: 6.04 mmHg  MV P1/2t: 59.6 msec  LVOT VTI: 26.7 cm     PV Mean Velocity: 0.88 m/s  MVA by PHT:3.69 cm^2 IVRT: 87.7 msec       PV Mean Gradient: 3.4 mmHg  MV Area              Estimated PASP: 38.83  (continuity): 2.7    mmHg  cm^2  MV E' Septal  Velocity: 0.12 m/s  MV E' Lateral  Velocity: 13 m/s  [email or web address] [id number]    XR CHEST PORTABLE    Result Date: 8/27/2022  EXAMINATION: ONE XRAY VIEW OF THE CHEST 8/27/2022 8:27 am COMPARISON: 11/23/2014 HISTORY: ORDERING SYSTEM PROVIDED HISTORY: chest pain TECHNOLOGIST PROVIDED HISTORY: Reason for exam:->chest pain FINDINGS: There is some scarring/atelectasis in the left hemithorax and significant sized loculated effusion. Right lung is clear. Heart size is normal.     Significant size loculated left effusion is suspected. There is some areas of scarring/atelectasis. Recommend follow-up chest CT post contrast.  Other etiologies are not excluded     CTA CHEST W CONTRAST    Result Date: 8/27/2022  EXAMINATION: CTA OF THE CHEST; CTA OF THE ABDOMEN AND PELVIS WITH CONTRAST 8/27/2022 9:55 am TECHNIQUE: CTA of the chest was performed after the administration of intravenous contrast.  Multiplanar reformatted images are provided for review. MIP images are provided for review. Automated exposure control, iterative reconstruction, and/or weight based adjustment of the mA/kV was utilized to reduce the radiation dose to as low as reasonably achievable.; CTA of the abdomen and pelvis was performed with the administration of intravenous contrast. Multiplanar reformatted images are provided for review. MIP images are provided for review. Automated exposure control, iterative reconstruction, and/or weight based adjustment of the mA/kV was utilized to reduce the radiation dose to as low as reasonably achievable.  COMPARISON: 11/23/2014 HISTORY: ORDERING SYSTEM PROVIDED HISTORY: chest pain; eval dissection TECHNOLOGIST PROVIDED HISTORY: Reason for exam:->chest pain; eval dissection Decision Support Exception - unselect if not a suspected or confirmed emergency medical condition->Emergency Medical Condition (MA) FINDINGS: Pulmonary Arteries: Pulmonary arteries are suboptimally opacified for evaluation. No obvious evidence of intraluminal filling defect to suggest pulmonary embolism. Main pulmonary artery is normal in caliber. Mediastinum: There are superior mediastinal lymph nodes on the left. Largest measures 2 cm. .  The heart and pericardium demonstrate no acute abnormality. There is no acute abnormality of the thoracic aorta. Some low-attenuation areas in the thyroid gland are not felt to be significant. There is mild coronary arterial vascular plaque. There is some plaque in the descending thoracic aorta. No evidence of thoracic aortic dissection or aneurysm. Lungs/pleura: There is collapse of much of the left upper lobe. This radiates to the left hilar region. .  There is a moderate size left pleural effusion with loculated components. There is interstitial and parenchymal infiltrate in the left lower lobe. Abdomen and pelvis: There is fatty infiltration in the liver. Spleen and pancreas are normal.  Kidneys are unremarkable. Origin of the celiac axis and SMA and single renal artery to each kidney is unremarkable. There is an infrarenal abdominal aortic aneurysm measuring 5.5 cm. This tapers at the bifurcation. There is mild arterial megaly of the common iliac arteries bilaterally. There is borderline wall prominence of the bladder. There is prostatic enlargement etiology indeterminate. No abdominal or pelvic lymphadenopathy or fluid collections are noted. Bowel loops are unremarkable. There are mild degenerative changes in the thoracic and lumbar spine. Soft Tissues/Bones: No acute bone or soft tissue abnormality. Collapse of most of the left upper lobe radiating to the left hilum. There are mediastinal lymph nodes. Central left hilar tumor is not excluded. Recommend bronchoscopy. Loculated left pleural effusion and significant infiltrate in the left lower lobe. Mild fatty infiltration in the liver. Infrarenal abdominal aortic aneurysm measuring 5.5 cm. Surgical consultation is recommended. This is new since the prior study Prostatic enlargement etiology indeterminate. CTA ABDOMEN PELVIS W CONTRAST    Result Date: 8/27/2022  EXAMINATION: CTA OF THE CHEST; CTA OF THE ABDOMEN AND PELVIS WITH CONTRAST 8/27/2022 9:55 am TECHNIQUE: CTA of the chest was performed after the administration of intravenous contrast.  Multiplanar reformatted images are provided for review. MIP images are provided for review. Automated exposure control, iterative reconstruction, and/or weight based adjustment of the mA/kV was utilized to reduce the radiation dose to as low as reasonably achievable.; CTA of the abdomen and pelvis was performed with the administration of intravenous contrast. Multiplanar reformatted images are provided for review. MIP images are provided for review. Automated exposure control, iterative reconstruction, and/or weight based adjustment of the mA/kV was utilized to reduce the radiation dose to as low as reasonably achievable. COMPARISON: 11/23/2014 HISTORY: ORDERING SYSTEM PROVIDED HISTORY: chest pain; eval dissection TECHNOLOGIST PROVIDED HISTORY: Reason for exam:->chest pain; eval dissection Decision Support Exception - unselect if not a suspected or confirmed emergency medical condition->Emergency Medical Condition (MA) FINDINGS: Pulmonary Arteries: Pulmonary arteries are suboptimally opacified for evaluation. No obvious evidence of intraluminal filling defect to suggest pulmonary embolism. Main pulmonary artery is normal in caliber. Mediastinum: There are superior mediastinal lymph nodes on the left. Largest measures 2 cm. .  The heart and pericardium demonstrate no acute abnormality.  There is no acute abnormality of the thoracic aorta. Some low-attenuation areas in the thyroid gland are not felt to be significant. There is mild coronary arterial vascular plaque. There is some plaque in the descending thoracic aorta. No evidence of thoracic aortic dissection or aneurysm. Lungs/pleura: There is collapse of much of the left upper lobe. This radiates to the left hilar region. .  There is a moderate size left pleural effusion with loculated components. There is interstitial and parenchymal infiltrate in the left lower lobe. Abdomen and pelvis: There is fatty infiltration in the liver. Spleen and pancreas are normal.  Kidneys are unremarkable. Origin of the celiac axis and SMA and single renal artery to each kidney is unremarkable. There is an infrarenal abdominal aortic aneurysm measuring 5.5 cm. This tapers at the bifurcation. There is mild arterial megaly of the common iliac arteries bilaterally. There is borderline wall prominence of the bladder. There is prostatic enlargement etiology indeterminate. No abdominal or pelvic lymphadenopathy or fluid collections are noted. Bowel loops are unremarkable. There are mild degenerative changes in the thoracic and lumbar spine. Soft Tissues/Bones: No acute bone or soft tissue abnormality. Collapse of most of the left upper lobe radiating to the left hilum. There are mediastinal lymph nodes. Central left hilar tumor is not excluded. Recommend bronchoscopy. Loculated left pleural effusion and significant infiltrate in the left lower lobe. Mild fatty infiltration in the liver. Infrarenal abdominal aortic aneurysm measuring 5.5 cm. Surgical consultation is recommended. This is new since the prior study Prostatic enlargement etiology indeterminate. NM Cardiac Stress Test Nuclear Imaging    Result Date: 8/30/2022  Indication:  Chest Pain. Clinical History:   Patient has no known historyof coronary artery disease.  IMAGING: Myocardial perfusion imaging was performed at rest 30-35 minutes following the intravenous injection of 11.3 mCi of (Tc-Sestamibi) followed by 10 ml of Normal Saline. At peak exercise, the patient was injected intravenously with 36mCi of (Tc-Sestamibi) followed by 10 ml of Normal Saline. Gated post-stress tomographic imaging was performed 20-25 minutes after stress. FINDINGS: The overall quality of the study was fair. Left ventricular cavity size was noted to be normal. Rotational analog analysis demonstrated inferior gut uptake and anterior soft tissue attenuation. The gated SPECT stress imaging in the short, vertical long, and horizontal long axis demonstrated normal homogeneous tracer distribution throughout the myocardium. The resting images are normal Gated SPECT left ventricular ejection fraction was calculated to be 60%, with normal wall motion. The myocardial perfusion imaging was normal with attenuation. Overall left ventricular systolic function was normal without regional wall motion abnormalities. Overall low risk study.        Discharge Medications:      Medication List        START taking these medications      clopidogrel 75 MG tablet  Commonly known as: PLAVIX  Take 1 tablet by mouth daily     doxycycline hyclate 100 MG capsule  Commonly known as: VIBRAMYCIN  Take 1 capsule by mouth every 12 hours for 6 doses            CONTINUE taking these medications      amLODIPine 5 MG tablet  Commonly known as: NORVASC     atorvastatin 40 MG tablet  Commonly known as: LIPITOR               Where to Get Your Medications        You can get these medications from any pharmacy    Bring a paper prescription for each of these medications  clopidogrel 75 MG tablet  doxycycline hyclate 100 MG capsule         Time Spent on discharge is more than 45 minutes in the examination, evaluation, counseling and review of medications and discharge plan.    +++++++++++++++++++++++++++++++++++++++++++++++++  Ethelyn Schaumann, [address] [place], [place]  +++++++++++++++++++++++++++++++++++++++++++++++++  NOTE: This report was transcribed using voice recognition software. Every effort was made to ensure accuracy; however, inadvertent computerized transcription errors may be present. Attestation: final pathology confirms lung carcinoma.

## 2022-08-31 NOTE — PROGRESS NOTES
[facility] [address]  [address], [address]  DIANNA STEWARD [facility], [address] [place] [address]      Pulmonary Progress Note      SUBJECTIVE:  sitting up at side of bed on 2 li NC, claims doing some better with breathing and coughing, possible discharge today     OBJECTIVE    Medications    Continuous Infusions:   sodium chloride         Scheduled Meds:   doxycycline hyclate  100 mg Oral 2 times per day    atorvastatin  40 mg Oral Daily    amLODIPine  5 mg Oral Daily    sodium chloride flush  10 mL IntraVENous 2 times per day    enoxaparin  30 mg SubCUTAneous BID    cefTRIAXone (ROCEPHIN) IV  1,000 mg IntraVENous Q24H       PRN Meds:perflutren lipid microspheres, sodium chloride flush, sodium chloride, promethazine **OR** ondansetron, polyethylene glycol, acetaminophen **OR** acetaminophen, oxyCODONE-acetaminophen, fentanNYL    Physical    VITALS:  /82   Pulse 71   Temp 98.1 °F (36.7 °C) (Oral)   Resp 18   Ht 6' 1\" (1.854 m)   Wt 230 lb (104.3 kg)   SpO2 98%   BMI 30.34 kg/m²     24HR INTAKE/OUTPUT:      Intake/Output Summary (Last 24 hours) at 2022 1435  Last data filed at 2022 0411  Gross per 24 hour   Intake 360 ml   Output --   Net 360 ml         24HR PULSE OXIMETRY RANGE:    SpO2  Av.8 %  Min: 95 %  Max: 98 %    General appearance: alert, appears stated age, and cooperative  Lungs: rhonchi bibasilar minimal  Heart: regular rate and rhythm, S1, S2 normal, no murmur, click, rub or gallop  Abdomen: soft, non-tender; bowel sounds normal; no masses,  no organomegaly  Extremities: edema bipedal  Neurologic: Mental status: Alert, oriented, thought content appropriate    Data    CBC:   Recent Labs     22   WBC 12.0* 11.5 11.8*   HGB 12.4* 11.9* 12.2*   HCT 33.8* 33.2* 35.0*   MCV 90.1 90.0 86.0    452* 513*         BMP:  Recent Labs     22  0409   * 133 131*   K 4.6 4.6 4.3  4.3 4.2   CL 95* 100 94*   CO2 24 23 26   PHOS 3.5 3.7 3.5   BUN 9 7 7   CREATININE 0.7 0.6* 0.6*      ALB:3,BILIDIR:3,BILITOT:3,ALKPHOS:3)@    PT/INR: No results for input(s): PROTIME, INR in the last 72 hours. ABG:   No results for input(s): PH, PO2, PCO2, HCO3, BE, O2SAT, METHB, O2HB, COHB, O2CON, HHB, THB in the last 72 hours. Radiology/Other tests reviewed: none    Assessment:     Principal Problem:    Pneumonia  Active Problems:    Encounter for preoperative assessment for noncoronary cardiac surgery    AAA (abdominal aortic aneurysm) without rupture (HCC)    Primary hypertension    Loculated pleural effusion    Atelectasis  Resolved Problems:    * No resolved hospital problems. *      Plan:       Await biopsy results on endobronchial mass, should be able to ffup with this as out-pt, discussed with pt, may be more malignant than benign at this point  Cont with oxygen use, will need to qualify for home use prior to discharge, discussed with nurse  CELESTE MDI prn  OOB to chair, ambulate with assistance  Can be discharged from pulmonary pov      Time at the bedside, reviewing labs and radiographs, reviewing notes and consultations, discussing with staff and family was more than 35 minutes. Thanks for letting us see this patient in consultation. Please contact us with any questions. Office [phone] or after hours through [employer], x [phone].

## 2022-08-31 NOTE — CARE COORDINATION
Patient to discharge home today. Met with patient at bedside, his wife will be transporting home and he reports no home going needs. Choiced patient for home oxygen, needs 3L NC; patient has no preference on DME company. Referral made to Saranya Westbrook at [facility] and portable tank to be delivered to the patient's room. The Plan for Transition of Care is related to the following treatment goals: discharge planning    The Patient and/or patient representative Faisalvickie Hall was provided with a choice of provider and agrees   with the discharge plan. [x] Yes [] No    Freedom of choice list was provided with basic dialogue that supports the patient's individualized plan of care/goals, treatment preferences and shares the quality data associated with the providers.  [x] Yes [] No     Jaquan Quiros, MSW, LSW [phone]

## 2022-09-01 LAB
BLOOD CULTURE, ROUTINE: NORMAL
CULTURE, BLOOD 2: NORMAL

## 2022-09-01 NOTE — PROGRESS NOTES
[facility] MD Sarah Miller Chi, MD Keren Baumgartner, MD Dartha Smalls, CNP   Renetta Holt, CAROLE  Progress Note    8/31/2022    SUBJECTIVE:   8/29: Off the floor at the time of my visit this afternoon, and endoscopy    8/30: Seen this afternoon. Feeling much better today. Pain at the procedure site. Good appetite and intake. 8/31: No new complaint ROS unremarkable    PROBLEM LIST:    Principal Problem:    Pneumonia  Active Problems:    Encounter for preoperative assessment for noncoronary cardiac surgery    AAA (abdominal aortic aneurysm) without rupture (HCC)    Primary hypertension    Loculated pleural effusion    Atelectasis  Resolved Problems:    * No resolved hospital problems.  *         DIET:    No diet orders on file     MEDS (scheduled):   REM      MEDS (infusions):  REM      MEDS (prn):  REM    PHYSICAL EXAM:     Patient Vitals for the past 24 hrs:   BP Temp Temp src Pulse Resp SpO2   08/31/22 1507 (!) 152/79 98.8 °F (37.1 °C) Oral 76 16 97 %   08/31/22 1105 -- -- -- -- 18 --   08/31/22 1035 -- -- -- -- 17 --   08/31/22 0745 138/82 98.1 °F (36.7 °C) Oral 71 16 98 %   08/31/22 0119 (!) 176/95 98.6 °F (37 °C) -- 80 18 95 %   @      Intake/Output Summary (Last 24 hours) at 8/31/2022 2323  Last data filed at 8/31/2022 0411  Gross per 24 hour   Intake 360 ml   Output --   Net 360 ml         Wt Readings from Last 3 Encounters:   08/28/22 230 lb (104.3 kg)   08/27/22 230 lb (104.3 kg)   01/16/15 210 lb (95.3 kg)     PE  in no apparent distress  NC/AT EOMI sclera and conjunctiva clear and anicteric mucous membranes are moist  Neck soft supple trachea midline no bruit no JVD  Coarse breath sounds, CTA bilaterally, except decreased BS on left side  Regular rhythm normal S1 and S2 no murmur no rub  Abdomen soft nontender nondistended normal active bowel sounds  Distal extremities are without edema  No rash or lesion on visible portions of integument  Pulses 2-3+ x4  Moves all 4 extremities  Cranial nerves II through XII grossly intact  Awake, alert, interactive and appropriate    DATA:    Recent Labs     08/29/22 0413 08/30/22 0413 08/31/22  0409   WBC 12.0* 11.5 11.8*   HGB 12.4* 11.9* 12.2*   HCT 33.8* 33.2* 35.0*   MCV 90.1 90.0 86.0    452* 513*     Recent Labs     08/29/22 0413 08/30/22 0413 08/31/22 0409   * 133 131*   K 4.6  4.6 4.3  4.3 4.2   CL 95* 100 94*   CO2 24 23 26   MG 1.8 1.9 2.0   PHOS 3.5 3.7 3.5   BUN 9 7 7   CREATININE 0.7 0.6* 0.6*   ALT 16 27 49*   AST 24 38 61*   BILITOT 0.4 0.4 0.4   ALKPHOS 55 59 65       Lab Results   Component Value Date    LABPROT 0.2 08/28/2022    LABPROT 0.2 08/28/2022       Assessment  Hyponatremia, hypoosmolar, euvolemic versus mildly hypovolemic. Urinary indices are consistent with hypovolemic hyponatremia. Urine osmolality is pending at the time of this dictation. Given the pneumonia and interstitial infiltration, consider an SIADH-like syndrome, though doubt it     Improved on IV normal saline drip.   Stable since stopping the IV fluid    Recommendations  Okay for discharge from renal standpoint  BMP 3 days  No SSRIs or thiazides or NSAIDs  Continue supportive care         Electronically signed by Caren Mancera MD on 8/31/2022

## 2022-09-15 NOTE — PROCEDURES
[address]                  [address] [place],  [facility]                               PULMONARY FUNCTION    PATIENT NAME: Berta Hernandez                    :        1955  MED REC NO:   [id number]                            ROOM:       [facility]  ACCOUNT NO:   [de-identified]                           ADMIT DATE: 2022  PROVIDER:     Latonia Sheldon MD    DATE OF PROCEDURE:  2022    INDICATION:  A 71-year-old male, 73 inches height, 230 pounds. FINDINGS:  FVC is 1.87, 38% predicted prebronchodilator and 2.09, 42%  predicted postbronchodilator. FEV1 is 1.07, 28% predicted  prebronchodilator and 1.29, 34% predicted postbronchodilator. Ratio is  57, 75% predicted prebronchodilator and 62, 81% predicted  postbronchodilator. FJC37-57% is 0.42, 14% predicted prebronchodilator  and 0.88, 30% predicted postbronchodilator. MVV is 48, 33% predicted. SVC is 1.93, 39% predicted prebronchodilator and 2.13, 43% predicted  postbronchodilator. RV is 1.94, 77% predicted prebronchodilator and  1.66, 66% predicted postbronchodilator. TLC is 3.87, 50% predicted  prebronchodilator and 3.8, 49% predicted postbronchodilator. Ratio is  50, 147% predicted prebronchodilator and 44, 128% predicted  postbronchodilator. DLCO is 8.68, 23% predicted and ratio 4.2, 88%  predicted. IMPRESSION:  Very severe obstructive lung disease with air trapping and  a significant bronchodilator response. There is a very severe reduction  in DLCO which may indicate parenchymal or vascular damage. Please  correlate clinically.         Mason Liz MD    D: 2022 16:50:33       T: 2022 23:50:05     [email or web address]  Job#: [id number]     Doc#: [id number]    CC:

## 2022-10-01 NOTE — PROGRESS NOTES
Physician Progress Note      PATIENTDipti Farr  REMBERTO #:                  [id number]  :                       1955  ADMIT DATE:       2022 8:48 PM  [address] [place] DATE:        2022 4:44 PM  RESPONDING  PROVIDER #:        Dipti Farr MD          QUERY TEXT:    Pt admitted with pneumonia, obstructing endobronchial mass left upper lung   lobe and noted to have surgical pathology consistent with Left Upper Lung Lobe   Carcinoma. If possible, please document in progress notes and d/c summary a   correlating diagnosis to explain pathology findings: The medical record reflects the following:  Risk Factors: endobronchial mass  obstructing left upper lobe bronchi,   atelectasis, loculated pleural effusion  Clinical Indicators:  Surgical Pathology Report: Lung, left upper lobe,   transbronchial biopsy: Minute focus of poorly differentiated carcinoma   involving bronchial connective tissue. Pulmonary alveolar parenchyma is not   present for evaluation. A minute focus of subepithelial tumor is present   within bronchial  connective tissue. Immunostains are performed to evaluate the nature of the   cells. The cells are positive for pankeratin, but negative for P40, TTF-1,   chromogranin, and synaptophysin. The definitive classification of  carcinoma cannot be determined in this limited specimen based on the   immunostain results.  Pulmonology PN- may be more malignant than benign at   this point.  DC summary- Patient to follow up with PCP as soon as   possible and pulmonology to discuss biopsy results.   Treatment: Bronchoscopy with biopsy of bronchus, pathology, consults,   monitoring    Thank you,  Raissa Rivera RN, CRCR  Clinical Documentation Improvement  [phone]  Options provided:  -- Left Upper Lung Lobe Carcinoma  -- Left Upper Lung Lobe Carcinoma  -- Other - I will add my own diagnosis  -- Disagree - Not applicable / Not valid  -- Disagree - Clinically unable to determine / Unknown  -- Refer to Clinical Documentation Reviewer    PROVIDER RESPONSE TEXT:    This patient has a obstructing endobronchial mass consistent with Left Upper   Lung Lobe Carcinoma.     Query created by: Chioma Rothman on 9/27/2022 8:51 AM      Electronically signed by:  Sheila Knapp MD 10/1/2022 11:47 AM

## 2022-10-07 ENCOUNTER — TELEPHONE (OUTPATIENT)
Dept: VASCULAR SURGERY | Age: 67
End: 2022-10-07

## 2022-10-10 ENCOUNTER — OFFICE VISIT (OUTPATIENT)
Dept: VASCULAR SURGERY | Age: 67
End: 2022-10-10
Payer: MEDICARE

## 2022-10-10 ENCOUNTER — PREP FOR PROCEDURE (OUTPATIENT)
Dept: VASCULAR SURGERY | Age: 67
End: 2022-10-10

## 2022-10-10 VITALS — BODY MASS INDEX: 30.48 KG/M2 | WEIGHT: 230 LBS | HEIGHT: 73 IN

## 2022-10-10 DIAGNOSIS — I71.43 INFRARENAL ABDOMINAL AORTIC ANEURYSM (AAA) WITHOUT RUPTURE: Primary | ICD-10-CM

## 2022-10-10 PROCEDURE — 1123F ACP DISCUSS/DSCN MKR DOCD: CPT

## 2022-10-10 PROCEDURE — 99212 OFFICE O/P EST SF 10 MIN: CPT

## 2022-10-10 NOTE — PROGRESS NOTES
Vascular Surgery Outpatient Followup    PCP : No primary care provider on file. HISTORY OF PRESENT ILLNESS:    The patient is a 74 y.o. male who is here in regards to f/u of his 5.1 cm AAA. Patient denies hx of abdominal or back pain. Pt was admitted to the hospital on 8/27/22 with chest pain. Work up revealed he had left sided infiltrate and effusion, as well as left upper lobe collapse. He also had a large loculated pleural effusion. A CTA revealed an incidental finding of 5.1 cm AAA. Pt presents today to discuss surgical options. Past Medical History:        Diagnosis Date    Hepatitis C     Hyperlipidemia     Hypertension     Intracranial hemorrhage ([facility] [address]      Past Surgical History:        Procedure Laterality Date    BACK SURGERY      BRONCHOSCOPY N/A 8/29/2022    BRONCHOSCOPY BIOPSY BRONCHUS performed by Delfino Ann MD at [facility] ENDOSCOPY     Current Medications:   Current Outpatient Medications   Medication Sig Dispense Refill    clopidogrel (PLAVIX) 75 MG tablet Take 1 tablet by mouth daily 30 tablet 0    albuterol sulfate HFA (VENTOLIN HFA) 108 (90 Base) MCG/ACT inhaler Inhale 2 puffs into the lungs 4 times daily as needed for Wheezing 18 g 5    atorvastatin (LIPITOR) 40 MG tablet Take 1 tablet by mouth daily 30 tablet 3    amLODIPine (NORVASC) 5 MG tablet Take 5 mg by mouth daily. No current facility-administered medications for this visit. Allergies:  Patient has no known allergies. Social History     Socioeconomic History    Marital status:      Spouse name: Not on file    Number of children: Not on file    Years of education: Not on file    Highest education level: Not on file   Occupational History    Not on file   Tobacco Use    Smoking status: Every Day     Packs/day: 0.50     Years: 40.00     Pack years: 20.00     Types: Cigarettes     Start date: 28    Smokeless tobacco: Never   Substance and Sexual Activity    Alcohol use:  Yes     Alcohol/week: 8.3 standard drinks     Comment: drinks once a week    Drug use: No    Sexual activity: Not on file   Other Topics Concern    Not on file   Social History Narrative    ** Merged History Encounter **          Social Determinants of Health     Financial Resource Strain: Not on file   Food Insecurity: Not on file   Transportation Needs: Not on file   Physical Activity: Not on file   Stress: Not on file   Social Connections: Not on file   Intimate Partner Violence: Not on file   Housing Stability: Not on file     No family history on file. - negative for AAA  Labs  Lab Results   Component Value Date    WBC 11.8 (H) 08/31/2022    HGB 12.2 (L) 08/31/2022    HCT 35.0 (L) 08/31/2022     (H) 08/31/2022    PROTIME 13.5 (H) 08/27/2022    INR 1.2 08/27/2022    APTT 37.8 (H) 08/27/2022    K 4.2 08/31/2022    BUN 7 08/31/2022    CREATININE 0.6 (L) 08/31/2022     PHYSICAL EXAM:    CONSTITUTIONAL:  awake, alert, cooperative  PSYCHIATRIC :  Oriented to time, place and person     Appropriate insight to disease process  EYES: Lids and lashes normal  ENT:  External ears and nose without lesions   Hearing deficits not noted  NECK:  supple, symmetrical, trachea midline  LUNGS:  No increased work of breathing                 Clear to auscultation  CARDIOVASCULAR:  regular rate and rhythm   ABDOMEN:   Soft, non-distended, non-tender    Aorta is palpable  SKIN:  normal skin color, texture, turgor  EXTREMITIES:   R LE Edema absent  L LE Edema absent  R femoral 2+ L femoral 2+   R posterior tibial biphasic L posterior tibial biphasic   R dorsalis pedis biphasic L dorsalis pedis biphasic     RADIOLOGY:  Imaging Study reviewed    A/P Assx 5.1 cm Abdominal Aortic Aneurysm  Continue medical management with plavix, and statin  Emphasized importance of Tobacco cessation  He is still smoking 5-6 cigarettes per day. Has cut down from 2 ppd.   He sees the [place] physician this week and will get a new script for nicotine patches there because they are free  Yes indication for surgical intervention at this time  Discussed with patient pathophysiology of AAA, risk of rupture and all ?s answered  Discussed with patient symptoms of abdominal pain, back pain and need to go to ER immediately if they if any symptoms developed  Will schedule pt for EVAR in the near future  The procedure was reviewed with the patient and wife. I discussed the procedure, risks, benefits, complications, and alternatives of the procedure. They understand and consent.   All questions were answered     Pt seen and plan reviewed with Dr. Jeffrey Kellogg, APRN - CNP

## 2022-10-15 PROBLEM — I71.43 INFRARENAL ABDOMINAL AORTIC ANEURYSM (AAA) WITHOUT RUPTURE: Status: ACTIVE | Noted: 2022-10-15

## 2022-10-15 PROBLEM — C34.92 CARCINOMA OF LEFT LUNG (HCC): Status: ACTIVE | Noted: 2022-10-15

## 2022-10-15 PROBLEM — Z01.810 PREOPERATIVE CARDIOVASCULAR EXAMINATION: Status: RESOLVED | Noted: 2022-08-28 | Resolved: 2022-10-15

## 2022-10-15 PROBLEM — E66.9 MODERATE OBESITY: Status: ACTIVE | Noted: 2022-10-15

## 2022-10-15 PROBLEM — E66.8 MODERATE OBESITY: Status: ACTIVE | Noted: 2022-10-15

## 2022-10-15 PROBLEM — E78.00 PURE HYPERCHOLESTEROLEMIA: Status: ACTIVE | Noted: 2022-10-15

## 2022-10-21 ENCOUNTER — OFFICE VISIT (OUTPATIENT)
Dept: CARDIOLOGY CLINIC | Age: 67
End: 2022-10-21
Payer: MEDICARE

## 2022-10-21 VITALS
DIASTOLIC BLOOD PRESSURE: 82 MMHG | HEART RATE: 78 BPM | HEIGHT: 73 IN | RESPIRATION RATE: 18 BRPM | SYSTOLIC BLOOD PRESSURE: 136 MMHG | BODY MASS INDEX: 31.94 KG/M2 | WEIGHT: 241 LBS

## 2022-10-21 DIAGNOSIS — J44.9 CHRONIC OBSTRUCTIVE PULMONARY DISEASE, UNSPECIFIED COPD TYPE (HCC): ICD-10-CM

## 2022-10-21 DIAGNOSIS — E78.00 PURE HYPERCHOLESTEROLEMIA: ICD-10-CM

## 2022-10-21 DIAGNOSIS — Z01.810 PREOPERATIVE CARDIOVASCULAR EXAMINATION: ICD-10-CM

## 2022-10-21 DIAGNOSIS — C34.92 CARCINOMA OF LEFT LUNG (HCC): ICD-10-CM

## 2022-10-21 DIAGNOSIS — I71.43 INFRARENAL ABDOMINAL AORTIC ANEURYSM (AAA) WITHOUT RUPTURE: Primary | ICD-10-CM

## 2022-10-21 DIAGNOSIS — E66.8 MODERATE OBESITY: ICD-10-CM

## 2022-10-21 PROCEDURE — 99215 OFFICE O/P EST HI 40 MIN: CPT | Performed by: INTERNAL MEDICINE

## 2022-10-21 PROCEDURE — 93000 ELECTROCARDIOGRAM COMPLETE: CPT | Performed by: INTERNAL MEDICINE

## 2022-10-21 PROCEDURE — 1123F ACP DISCUSS/DSCN MKR DOCD: CPT | Performed by: INTERNAL MEDICINE

## 2022-10-21 RX ORDER — CLOPIDOGREL BISULFATE 75 MG/1
75 TABLET ORAL DAILY
Qty: 30 TABLET | Refills: 0 | Status: SHIPPED | OUTPATIENT
Start: 2022-10-21

## 2022-10-21 RX ORDER — PANTOPRAZOLE SODIUM 40 MG/1
40 TABLET, DELAYED RELEASE ORAL DAILY
COMMUNITY

## 2022-10-21 NOTE — PROGRESS NOTES
OUTPATIENT CARDIOLOGY FOLLOW-UP    Name: Kemar Goetz    Age: 74 y.o. Primary Care Physician: Annemarie Street MD    Date of Service: 10/21/2022    Chief Complaint: Infrarenal abdominal aortic aneurysm, chronic obstructive lung disease, carcinoma of the lung, moderate obesity, preoperative cardiovascular evaluation    Interim History: Since his evaluation during hospitalization, the patient has received care exclusively through the [employer] [facility] with his [employer] [facility] primary care physician refusing to her fill his clopidogrel prescription and spite of receipt of his discharge summary including the findings of his infrarenal abdominal aortic aneurysm. In addition, no follow-up of the findings of his endobronchial biopsy demonstrating poorly differentiated carcinoma have been recommended. As has been received in prior correspondence, perfusion imaging demonstrated no evidence of perfusion abnormalities with normal left ventricular systolic function permitting progressing with endovascular repair of his abdominal aortic aneurysm at an acceptable risk. In the interim since his hospital discharge, he relates no interim symptoms of anginal-like chest discomfort or other ischemic equivalents nor manifestations of decompensated left ventricular systolic dysfunction or volume overload. Continues to note symptoms of exertional dyspnea (functional class II) without additional manifestations of decompensated left ventricular systolic dysfunction or volume overload no changes of his functional capabilities. He has presently been reevaluated by the vascular surgical service with plans of endovascular aneurysm repair within the next week. On the day of his present evaluation, borderline stage I systolic hypertension is noted. He has been unable to terminate tobacco consumption but has reduced consumption to less than 1/4 pack of cigarettes on a daily basis. Review of Systems:    The remainder of a complete multisystem review including consitutional, central nervous, respiratory, circulatory, gastrointestinal, genitourinary, endocrinologic, hematologic, musculoskeletal and psychiatric are negative. Past Medical History:  Past Medical History:   Diagnosis Date    Hepatitis C     Hyperlipidemia     Hypertension     Intracranial hemorrhage ([facility] [address]        Past Surgical History:  Past Surgical History:   Procedure Laterality Date    BACK SURGERY      BRONCHOSCOPY N/A 8/29/2022    BRONCHOSCOPY BIOPSY BRONCHUS performed by Alejandro Hubbard MD at [facility] ENDOSCOPY       Family History:  No family history on file. Social History:  Social History     Socioeconomic History    Marital status:      Spouse name: Not on file    Number of children: Not on file    Years of education: Not on file    Highest education level: Not on file   Occupational History    Not on file   Tobacco Use    Smoking status: Every Day     Packs/day: 0.50     Years: 40.00     Pack years: 20.00     Types: Cigarettes     Start date: 28    Smokeless tobacco: Never   Vaping Use    Vaping Use: Never used   Substance and Sexual Activity    Alcohol use:  Yes     Alcohol/week: 8.3 standard drinks     Comment: drinks once a week    Drug use: No    Sexual activity: Not on file   Other Topics Concern    Not on file   Social History Narrative    ** Merged History Encounter **          Social Determinants of Health     Financial Resource Strain: Not on file   Food Insecurity: Not on file   Transportation Needs: Not on file   Physical Activity: Not on file   Stress: Not on file   Social Connections: Not on file   Intimate Partner Violence: Not on file   Housing Stability: Not on file       Allergies:  No Known Allergies    Current Medications:  Current Outpatient Medications   Medication Sig Dispense Refill    Misc Natural Products (URINOZINC PO) Take by mouth daily      pantoprazole (PROTONIX) 40 MG tablet Take 40 mg by mouth daily      albuterol sulfate HFA (VENTOLIN HFA) 108 (90 Base) MCG/ACT inhaler Inhale 2 puffs into the lungs 4 times daily as needed for Wheezing 18 g 5    atorvastatin (LIPITOR) 40 MG tablet Take 1 tablet by mouth daily 30 tablet 3    amLODIPine (NORVASC) 10 MG tablet Take 10 mg by mouth daily      clopidogrel (PLAVIX) 75 MG tablet Take 1 tablet by mouth daily (Patient not taking: Reported on 10/21/2022) 30 tablet 0     No current facility-administered medications for this visit. Physical Exam:  /82   Pulse 78   Resp 18   Ht 6' 1\" (1.854 m)   Wt 241 lb (109.3 kg)   BMI 31.80 kg/m²   Wt Readings from Last 3 Encounters:   10/21/22 241 lb (109.3 kg)   10/10/22 230 lb (104.3 kg)   08/28/22 230 lb (104.3 kg)     The patient is awake, alert and in no discomfort or distress. No gross musculoskeletal deformity is present. No significant skin or nail changes are present. Gross examination of head, eyes, nose and throat are negative. Jugular venous pressure is normal and no carotid bruits are present. Normal respiratory effort is noted with no accessory muscle usage present. Lung fields straight a prolonged expiratory phase of respiration with mild bronchospasm to ascultation. Cardiac examination is notable for a regular rate and rhythm with no palpable thrill. No gallop rhythm or cardiac murmur are identified. A benign abdominal examination is present with the exception of mild obesity and no masses or organomegaly. Intact pulses are present throughout all extremities and no peripheral edema is present. No focal neurologic deficits are present.     Laboratory Tests:  Lab Results   Component Value Date    CREATININE 0.6 (L) 08/31/2022    BUN 7 08/31/2022     (L) 08/31/2022    K 4.2 08/31/2022    CL 94 (L) 08/31/2022    CO2 26 08/31/2022     No results found for: BNP  Lab Results   Component Value Date/Time    WBC 11.8 08/31/2022 04:09 AM    RBC 4.07 08/31/2022 04:09 AM    HGB 12.2 08/31/2022 04:09 AM    HCT 35.0 08/31/2022 04:09 AM    MCV 86.0 08/31/2022 04:09 AM    MCH 30.0 08/31/2022 04:09 AM    MCHC 34.9 08/31/2022 04:09 AM    RDW 14.0 08/31/2022 04:09 AM     08/31/2022 04:09 AM    MPV 8.5 08/31/2022 04:09 AM     No results for input(s): ALKPHOS, ALT, AST, PROT, BILITOT, BILIDIR, LABALBU in the last 72 hours. Lab Results   Component Value Date/Time    MG 2.0 08/31/2022 04:09 AM     Lab Results   Component Value Date/Time    PROTIME 13.5 08/27/2022 08:14 AM    INR 1.2 08/27/2022 08:14 AM     Lab Results   Component Value Date/Time    TSH 4.520 08/28/2022 01:49 PM     No components found for: CHLPL  No results found for: TRIG  No results found for: HDL  No results found for: [address]    Cardiac Tests:  ECG: A resting electrocardiogram demonstrates evidence of sinus rhythm with left axis deviation      ASSESSMENT / PLAN: On a clinical basis, the patient presently appears compensated in the face of his known atherosclerotic vascular disease and presently based on this and the findings of his prior risk assessment would be an acceptable candidate for his proposed endovascular abdominal aortic aneurysm repair from a cardiovascular standpoint. Cautious periprocedural fluid administration will remain essential to reducing his risk of iatrogenic volume overload and/or perioperative congestive heart failure. Presently, I have resumed his clopidogrel for appropriate antiplatelet therapy will defer additional recommendations to the vascular service with further refills to be provided by his [place] [id number]. I have also extensively discussed with he and his wife that based on the abnormalities of his biopsy results that prompt follow-up with the pulmonary service would be advisable and if a referral was necessary need to come through the [employer].   Addition of extensively discussed his needs of smoking cessation both to reduce risk of further adverse effects on his chronic obstructive lung disease and cancer risks along with that of reducing risk of atherosclerotic progression. Ongoing aggressive risk factor modification of blood pressure and serum lipids the latter with goals of maintaining LDL cholesterol levels below 70 mg/dL will be necessary to reduce risk of atherosclerotic development. I will otherwise return him to your care with above reference recommendations and would happily reevaluate him in the future should additional cardiovascular difficulties or concerns arise. The patient's current medication list, allergies, problem list and results of all previously ordered testing were reviewed at today's visit. Follow-up office visit as needed should additional cardiovascular difficulties or concerns arise      Note: This report was completed using computerized voice recognition software. Every effort has been made to ensure accuracy, however; inadvertent computerized transcription errors may be present. Neyda Novak.  Marcelo jennifer, [address] [place] [address] Cardiology    An electronic copy of this follow-up progress note was forwarded to Dr. Bree Tillman and [employer]

## 2022-10-26 ENCOUNTER — TELEPHONE (OUTPATIENT)
Dept: CARDIOLOGY CLINIC | Age: 67
End: 2022-10-26

## 2022-10-26 NOTE — TELEPHONE ENCOUNTER
[facility] needs to know how long patient is to remain on Plavix. Please advise.        Fax: [phone]

## 2022-10-28 NOTE — PROGRESS NOTES
[address]   PRE-ADMISSION TESTING GENERAL INSTRUCTIONS  [facility] Phone Number: [phone]      GENERAL INSTRUCTIONS:    [x] Antibacterial Soap Shower Night before and/or AM of surgery. [x] Do not wear makeup, lotions, powders, deodorant. [x] Nothing by mouth after midnight; including gum, candy, mints, or water. [x] You may brush your teeth, gargle, but do NOT swallow water. [x] No tobacco products, illegal drugs, or alcohol within 24 hours of your surgery. [x] Jewelry or valuables should not be brought to the hospital. All body and/or tongue piercing's must be removed prior to arriving to hospital. No contact lens or hair pins. [x] Bring insurance card and photo ID. [x] Bring copy of living will or healthcare power of  papers to be placed in your electronic record. [x] Transfusion Bracelet: Please bring with you to hospital, day of surgery. PARKING INSTRUCTIONS:     [x] ARRIVAL DATE & TIME: 11/10/22 at 0530  [x] Enter into the [facility]. Two people may accompany you. Masks are not required but are recommended. [x] Parking Lot \"I\" is where you will park. It is located on the corner of [address] and [address]. The entrance is on [address]. Upon entering the parking lot, a voucher ticket will print    EDUCATION INSTRUCTIONS:           [x] [address] placed in chart. [x] Pre-admission Testing educational folder given  [x] Incentive Spirometry,coughing & deep breathing exercises reviewed. [x] Pain: Post-op pain is normal and to be expected. You will be asked to rate your pain from 0-10. MEDICATION INSTRUCTIONS:    [x] Bring a complete list of your medications, please write the last time you took the medicine, give this list to the nurse in Pre-Op. [x] Take only the following medications the morning of surgery with 1-2 ounces of water: norvasc and protonix. Use Albuterol if needed.   [x] Stop all herbal supplements and vitamins 5 days before surgery. Stop NSAIDS 7 days before surgery. [x] Use your inhalers the morning of surgery if needed. Bring your emergency inhaler with you day of surgery. [x] Follow physician instructions regarding any blood thinners you may be taking. WHAT TO EXPECT:    [x] The day of surgery you will be greeted and checked in by the [employer] [facility].  In addition, you will be registered in the [facility] by a Patient Access Representative. Please bring your photo ID and insurance card. A nurse will greet you in accordance to the time you are needed in the pre-op area to prepare you for surgery. Please do not be discouraged if you are not greeted in the order you arrive as there are many variables that are involved in patient preparation. Your patience is greatly appreciated as you wait for your nurse. Please bring in items such as: books, magazines, newspapers, electronics, or any other items  to occupy your time in the waiting area. [x]  Delays may occur with surgery and staff will make a sincere effort to keep you informed of delays. If any delays occur with your procedure, we apologize ahead of time for your inconvenience as we recognize the value of your time.

## 2022-11-03 ENCOUNTER — HOSPITAL ENCOUNTER (OUTPATIENT)
Age: 67
Discharge: HOME | End: 2022-11-03
Payer: MEDICARE

## 2022-11-03 ENCOUNTER — HOSPITAL ENCOUNTER (OUTPATIENT)
Dept: PREADMISSION TESTING | Age: 67
Discharge: HOME | End: 2022-11-03
Payer: MEDICARE

## 2022-11-03 ENCOUNTER — HOSPITAL ENCOUNTER (OUTPATIENT)
Dept: GENERAL RADIOLOGY | Age: 67
Discharge: HOME | End: 2022-11-05
Payer: MEDICARE

## 2022-11-03 VITALS
DIASTOLIC BLOOD PRESSURE: 81 MMHG | HEIGHT: 73 IN | SYSTOLIC BLOOD PRESSURE: 149 MMHG | TEMPERATURE: 98 F | WEIGHT: 241 LBS | RESPIRATION RATE: 16 BRPM | OXYGEN SATURATION: 97 % | HEART RATE: 69 BPM | BODY MASS INDEX: 31.94 KG/M2

## 2022-11-03 DIAGNOSIS — I71.43 INFRARENAL ABDOMINAL AORTIC ANEURYSM (AAA) WITHOUT RUPTURE: ICD-10-CM

## 2022-11-03 DIAGNOSIS — Z01.812 PRE-OPERATIVE LABORATORY EXAMINATION: Primary | ICD-10-CM

## 2022-11-03 LAB
ABO/RH: NORMAL
ANION GAP SERPL CALCULATED.3IONS-SCNC: 11 MMOL/L (ref 7–16)
ANTIBODY SCREEN: NORMAL
BUN BLDV-MCNC: 11 MG/DL (ref 6–23)
CALCIUM SERPL-MCNC: 9.5 MG/DL (ref 8.6–10.2)
CHLORIDE BLD-SCNC: 104 MMOL/L (ref 98–107)
CO2: 24 MMOL/L (ref 22–29)
CREAT SERPL-MCNC: 0.7 MG/DL (ref 0.7–1.2)
GFR SERPL CREATININE-BSD FRML MDRD: >60 ML/MIN/1.73
GLUCOSE BLD-MCNC: 106 MG/DL (ref 74–99)
HCT VFR BLD CALC: 42.7 % (ref 37–54)
HEMOGLOBIN: 14.7 G/DL (ref 12.5–16.5)
INR BLD: 1.1
MCH RBC QN AUTO: 30.4 PG (ref 26–35)
MCHC RBC AUTO-ENTMCNC: 34.4 % (ref 32–34.5)
MCV RBC AUTO: 88.4 FL (ref 80–99.9)
PDW BLD-RTO: 15.5 FL (ref 11.5–15)
PLATELET # BLD: 354 E9/L (ref 130–450)
PMV BLD AUTO: 8.3 FL (ref 7–12)
POTASSIUM REFLEX MAGNESIUM: 4 MMOL/L (ref 3.5–5)
PROTHROMBIN TIME: 11.5 SEC (ref 9.3–12.4)
RBC # BLD: 4.83 E12/L (ref 3.8–5.8)
SODIUM BLD-SCNC: 139 MMOL/L (ref 132–146)
WBC # BLD: 8.2 E9/L (ref 4.5–11.5)

## 2022-11-03 PROCEDURE — 36415 COLL VENOUS BLD VENIPUNCTURE: CPT

## 2022-11-03 PROCEDURE — 87081 CULTURE SCREEN ONLY: CPT

## 2022-11-03 PROCEDURE — 86923 COMPATIBILITY TEST ELECTRIC: CPT

## 2022-11-03 PROCEDURE — 85027 COMPLETE CBC AUTOMATED: CPT

## 2022-11-03 PROCEDURE — 85610 PROTHROMBIN TIME: CPT

## 2022-11-03 PROCEDURE — 71046 X-RAY EXAM CHEST 2 VIEWS: CPT

## 2022-11-03 PROCEDURE — 86901 BLOOD TYPING SEROLOGIC RH(D): CPT

## 2022-11-03 PROCEDURE — 86900 BLOOD TYPING SEROLOGIC ABO: CPT

## 2022-11-03 PROCEDURE — 80048 BASIC METABOLIC PNL TOTAL CA: CPT

## 2022-11-03 PROCEDURE — 86850 RBC ANTIBODY SCREEN: CPT

## 2022-11-03 NOTE — PROGRESS NOTES
Patient states BP has been running this high Dr. Brant Libman, [place] physician prescribed a new  BP medication yesterday patient has not started the medication and does not know the name. Medication has not arrived in the mail. States did take his other BP med this am.    Patient instructed to call pre admission test once medication arrives and to provide the name, the dose and how often to take, and when he started taking. Patient verbalized understanding.

## 2022-11-04 LAB — MRSA CULTURE ONLY: NORMAL

## 2022-11-04 RX ORDER — LISINOPRIL 10 MG/1
10 TABLET ORAL DAILY
COMMUNITY

## 2022-11-09 ENCOUNTER — ANESTHESIA EVENT (OUTPATIENT)
Dept: OPERATING ROOM | Age: 67
DRG: 269 | End: 2022-11-09
Payer: MEDICARE

## 2022-11-10 ENCOUNTER — HOSPITAL ENCOUNTER (INPATIENT)
Age: 67
LOS: 1 days | Discharge: HOME | DRG: 269 | End: 2022-11-11
Attending: SURGERY | Admitting: SURGERY
Payer: MEDICARE

## 2022-11-10 ENCOUNTER — ANESTHESIA (OUTPATIENT)
Dept: OPERATING ROOM | Age: 67
DRG: 269 | End: 2022-11-10
Payer: MEDICARE

## 2022-11-10 DIAGNOSIS — I71.43 INFRARENAL ABDOMINAL AORTIC ANEURYSM (AAA) WITHOUT RUPTURE: Primary | ICD-10-CM

## 2022-11-10 LAB
ANION GAP SERPL CALCULATED.3IONS-SCNC: 10 MMOL/L (ref 7–16)
BUN BLDV-MCNC: 9 MG/DL (ref 6–23)
CALCIUM SERPL-MCNC: 9.1 MG/DL (ref 8.6–10.2)
CHLORIDE BLD-SCNC: 101 MMOL/L (ref 98–107)
CO2: 22 MMOL/L (ref 22–29)
CREAT SERPL-MCNC: 0.8 MG/DL (ref 0.7–1.2)
GFR SERPL CREATININE-BSD FRML MDRD: >60 ML/MIN/1.73
GLUCOSE BLD-MCNC: 159 MG/DL (ref 74–99)
HCT VFR BLD CALC: 42.7 % (ref 37–54)
HEMOGLOBIN: 14.6 G/DL (ref 12.5–16.5)
MCH RBC QN AUTO: 30.9 PG (ref 26–35)
MCHC RBC AUTO-ENTMCNC: 34.2 % (ref 32–34.5)
MCV RBC AUTO: 90.3 FL (ref 80–99.9)
PDW BLD-RTO: 15 FL (ref 11.5–15)
PLATELET # BLD: 327 E9/L (ref 130–450)
PMV BLD AUTO: 8.3 FL (ref 7–12)
POTASSIUM SERPL-SCNC: 4.2 MMOL/L (ref 3.5–5)
RBC # BLD: 4.73 E12/L (ref 3.8–5.8)
SODIUM BLD-SCNC: 133 MMOL/L (ref 132–146)
WBC # BLD: 8.5 E9/L (ref 4.5–11.5)

## 2022-11-10 PROCEDURE — C1894 INTRO/SHEATH, NON-LASER: HCPCS | Performed by: SURGERY

## 2022-11-10 PROCEDURE — 6360000002 HC RX W HCPCS

## 2022-11-10 PROCEDURE — 36415 COLL VENOUS BLD VENIPUNCTURE: CPT

## 2022-11-10 PROCEDURE — 94640 AIRWAY INHALATION TREATMENT: CPT

## 2022-11-10 PROCEDURE — A4217 STERILE WATER/SALINE, 500 ML: HCPCS | Performed by: SURGERY

## 2022-11-10 PROCEDURE — 7100000001 HC PACU RECOVERY - ADDTL 15 MIN

## 2022-11-10 PROCEDURE — 2709999900 HC NON-CHARGEABLE SUPPLY: Performed by: SURGERY

## 2022-11-10 PROCEDURE — 85347 COAGULATION TIME ACTIVATED: CPT

## 2022-11-10 PROCEDURE — 6360000004 HC RX CONTRAST MEDICATION: Performed by: SURGERY

## 2022-11-10 PROCEDURE — 94664 DEMO&/EVAL PT USE INHALER: CPT

## 2022-11-10 PROCEDURE — 6370000000 HC RX 637 (ALT 250 FOR IP): Performed by: ANESTHESIOLOGY

## 2022-11-10 PROCEDURE — 2580000003 HC RX 258: Performed by: SURGERY

## 2022-11-10 PROCEDURE — 2580000003 HC RX 258: Performed by: PHYSICIAN ASSISTANT

## 2022-11-10 PROCEDURE — 6360000002 HC RX W HCPCS: Performed by: PHYSICIAN ASSISTANT

## 2022-11-10 PROCEDURE — C2628 CATHETER, OCCLUSION: HCPCS | Performed by: SURGERY

## 2022-11-10 PROCEDURE — 7100000000 HC PACU RECOVERY - FIRST 15 MIN: Performed by: SURGERY

## 2022-11-10 PROCEDURE — 3700000000 HC ANESTHESIA ATTENDED CARE: Performed by: SURGERY

## 2022-11-10 PROCEDURE — 3600000017 HC SURGERY HYBRID ADDL 15MIN: Performed by: SURGERY

## 2022-11-10 PROCEDURE — 34705 EVAC RPR A-BIILIAC NDGFT: CPT

## 2022-11-10 PROCEDURE — 2500000003 HC RX 250 WO HCPCS: Performed by: NURSE PRACTITIONER

## 2022-11-10 PROCEDURE — 6360000002 HC RX W HCPCS: Performed by: NURSE PRACTITIONER

## 2022-11-10 PROCEDURE — C1760 CLOSURE DEV, VASC: HCPCS | Performed by: SURGERY

## 2022-11-10 PROCEDURE — 34705 EVAC RPR A-BIILIAC NDGFT: CPT | Performed by: SURGERY

## 2022-11-10 PROCEDURE — 6370000000 HC RX 637 (ALT 250 FOR IP): Performed by: NURSE PRACTITIONER

## 2022-11-10 PROCEDURE — 04V03DZ RESTRICTION OF ABDOMINAL AORTA WITH INTRALUMINAL DEVICE, PERCUTANEOUS APPROACH: ICD-10-PCS | Performed by: SURGERY

## 2022-11-10 PROCEDURE — 85027 COMPLETE CBC AUTOMATED: CPT

## 2022-11-10 PROCEDURE — 2000000000 HC ICU R&B

## 2022-11-10 PROCEDURE — 7100000001 HC PACU RECOVERY - ADDTL 15 MIN: Performed by: SURGERY

## 2022-11-10 PROCEDURE — 3700000001 HC ADD 15 MINUTES (ANESTHESIA): Performed by: SURGERY

## 2022-11-10 PROCEDURE — 6360000002 HC RX W HCPCS: Performed by: ANESTHESIOLOGY

## 2022-11-10 PROCEDURE — C1768 GRAFT, VASCULAR: HCPCS | Performed by: SURGERY

## 2022-11-10 PROCEDURE — 6360000002 HC RX W HCPCS: Performed by: SURGERY

## 2022-11-10 PROCEDURE — 2500000003 HC RX 250 WO HCPCS

## 2022-11-10 PROCEDURE — 7100000000 HC PACU RECOVERY - FIRST 15 MIN

## 2022-11-10 PROCEDURE — 80048 BASIC METABOLIC PNL TOTAL CA: CPT

## 2022-11-10 PROCEDURE — C1769 GUIDE WIRE: HCPCS | Performed by: SURGERY

## 2022-11-10 PROCEDURE — 37799 UNLISTED PX VASCULAR SURGERY: CPT

## 2022-11-10 PROCEDURE — 2580000003 HC RX 258

## 2022-11-10 PROCEDURE — 2580000003 HC RX 258: Performed by: NURSE PRACTITIONER

## 2022-11-10 PROCEDURE — 3600000007 HC SURGERY HYBRID BASE: Performed by: SURGERY

## 2022-11-10 DEVICE — EXCLUDER AAA ENDO CONTRA LEG 16MMX23MMX12CM 14FR
Type: IMPLANTABLE DEVICE | Site: AORTA | Status: FUNCTIONAL
Brand: GORE EXCLUDER AAA ENDOPROSTHESIS

## 2022-11-10 DEVICE — EXCLUDER AAA ENDO TRNK IPSI 31MMX14.5MMX13CM 18FR
Type: IMPLANTABLE DEVICE | Site: AORTA | Status: FUNCTIONAL
Brand: GORE EXCLUDER AAA ENDOPROSTHESIS WITH C3 DELIVERY

## 2022-11-10 DEVICE — EXCLUDER AAA ENDO EXTENDER 32MMX4.5CM 17FR
Type: IMPLANTABLE DEVICE | Site: GROIN | Status: FUNCTIONAL
Brand: GORE EXCLUDER AAA ENDOPROSTHESIS

## 2022-11-10 RX ORDER — IPRATROPIUM BROMIDE AND ALBUTEROL SULFATE 2.5; .5 MG/3ML; MG/3ML
1 SOLUTION RESPIRATORY (INHALATION) ONCE
Status: COMPLETED | OUTPATIENT
Start: 2022-11-10 | End: 2022-11-10

## 2022-11-10 RX ORDER — ONDANSETRON 2 MG/ML
4 INJECTION INTRAMUSCULAR; INTRAVENOUS EVERY 6 HOURS PRN
Status: DISCONTINUED | OUTPATIENT
Start: 2022-11-10 | End: 2022-11-11 | Stop reason: HOSPADM

## 2022-11-10 RX ORDER — EPHEDRINE SULFATE/0.9% NACL/PF 50 MG/5 ML
SYRINGE (ML) INTRAVENOUS PRN
Status: DISCONTINUED | OUTPATIENT
Start: 2022-11-10 | End: 2022-11-10 | Stop reason: SDUPTHER

## 2022-11-10 RX ORDER — FENTANYL CITRATE 50 UG/ML
INJECTION, SOLUTION INTRAMUSCULAR; INTRAVENOUS PRN
Status: DISCONTINUED | OUTPATIENT
Start: 2022-11-10 | End: 2022-11-10 | Stop reason: SDUPTHER

## 2022-11-10 RX ORDER — SODIUM CHLORIDE, SODIUM LACTATE, POTASSIUM CHLORIDE, CALCIUM CHLORIDE 600; 310; 30; 20 MG/100ML; MG/100ML; MG/100ML; MG/100ML
INJECTION, SOLUTION INTRAVENOUS CONTINUOUS
Status: DISCONTINUED | OUTPATIENT
Start: 2022-11-10 | End: 2022-11-11

## 2022-11-10 RX ORDER — SODIUM CHLORIDE 0.9 % (FLUSH) 0.9 %
5-40 SYRINGE (ML) INJECTION EVERY 12 HOURS SCHEDULED
Status: DISCONTINUED | OUTPATIENT
Start: 2022-11-10 | End: 2022-11-11 | Stop reason: HOSPADM

## 2022-11-10 RX ORDER — LISINOPRIL 10 MG/1
10 TABLET ORAL DAILY
Status: DISCONTINUED | OUTPATIENT
Start: 2022-11-11 | End: 2022-11-11 | Stop reason: HOSPADM

## 2022-11-10 RX ORDER — HEPARIN SODIUM 1000 [USP'U]/ML
INJECTION, SOLUTION INTRAVENOUS; SUBCUTANEOUS PRN
Status: DISCONTINUED | OUTPATIENT
Start: 2022-11-10 | End: 2022-11-10 | Stop reason: SDUPTHER

## 2022-11-10 RX ORDER — SODIUM CHLORIDE 9 MG/ML
INJECTION, SOLUTION INTRAVENOUS CONTINUOUS PRN
Status: DISCONTINUED | OUTPATIENT
Start: 2022-11-10 | End: 2022-11-10 | Stop reason: SDUPTHER

## 2022-11-10 RX ORDER — MIDAZOLAM HYDROCHLORIDE 1 MG/ML
INJECTION INTRAMUSCULAR; INTRAVENOUS
Status: COMPLETED
Start: 2022-11-10 | End: 2022-11-10

## 2022-11-10 RX ORDER — NEOSTIGMINE METHYLSULFATE 1 MG/ML
INJECTION, SOLUTION INTRAVENOUS PRN
Status: DISCONTINUED | OUTPATIENT
Start: 2022-11-10 | End: 2022-11-10 | Stop reason: SDUPTHER

## 2022-11-10 RX ORDER — PHENYLEPHRINE HCL IN 0.9% NACL 1 MG/10 ML
SYRINGE (ML) INTRAVENOUS PRN
Status: DISCONTINUED | OUTPATIENT
Start: 2022-11-10 | End: 2022-11-10 | Stop reason: SDUPTHER

## 2022-11-10 RX ORDER — SODIUM CHLORIDE 9 MG/ML
INJECTION, SOLUTION INTRAVENOUS PRN
Status: DISCONTINUED | OUTPATIENT
Start: 2022-11-10 | End: 2022-11-10 | Stop reason: HOSPADM

## 2022-11-10 RX ORDER — ATORVASTATIN CALCIUM 40 MG/1
40 TABLET, FILM COATED ORAL DAILY
Status: DISCONTINUED | OUTPATIENT
Start: 2022-11-11 | End: 2022-11-11 | Stop reason: HOSPADM

## 2022-11-10 RX ORDER — CLOPIDOGREL BISULFATE 75 MG/1
75 TABLET ORAL DAILY
Status: DISCONTINUED | OUTPATIENT
Start: 2022-11-11 | End: 2022-11-11 | Stop reason: HOSPADM

## 2022-11-10 RX ORDER — LABETALOL HYDROCHLORIDE 5 MG/ML
INJECTION, SOLUTION INTRAVENOUS PRN
Status: DISCONTINUED | OUTPATIENT
Start: 2022-11-10 | End: 2022-11-10 | Stop reason: SDUPTHER

## 2022-11-10 RX ORDER — PROPOFOL 10 MG/ML
INJECTION, EMULSION INTRAVENOUS PRN
Status: DISCONTINUED | OUTPATIENT
Start: 2022-11-10 | End: 2022-11-10 | Stop reason: SDUPTHER

## 2022-11-10 RX ORDER — PANTOPRAZOLE SODIUM 40 MG/1
40 TABLET, DELAYED RELEASE ORAL DAILY
Status: DISCONTINUED | OUTPATIENT
Start: 2022-11-11 | End: 2022-11-11 | Stop reason: HOSPADM

## 2022-11-10 RX ORDER — MIDAZOLAM HYDROCHLORIDE 1 MG/ML
INJECTION INTRAMUSCULAR; INTRAVENOUS PRN
Status: DISCONTINUED | OUTPATIENT
Start: 2022-11-10 | End: 2022-11-10 | Stop reason: SDUPTHER

## 2022-11-10 RX ORDER — SODIUM CHLORIDE 9 MG/ML
INJECTION, SOLUTION INTRAVENOUS PRN
Status: DISCONTINUED | OUTPATIENT
Start: 2022-11-10 | End: 2022-11-11 | Stop reason: HOSPADM

## 2022-11-10 RX ORDER — SODIUM CHLORIDE 0.9 % (FLUSH) 0.9 %
5-40 SYRINGE (ML) INJECTION PRN
Status: DISCONTINUED | OUTPATIENT
Start: 2022-11-10 | End: 2022-11-11 | Stop reason: HOSPADM

## 2022-11-10 RX ORDER — MIDAZOLAM HYDROCHLORIDE 1 MG/ML
INJECTION INTRAMUSCULAR; INTRAVENOUS
Status: DISCONTINUED
Start: 2022-11-10 | End: 2022-11-10 | Stop reason: WASHOUT

## 2022-11-10 RX ORDER — ENOXAPARIN SODIUM 100 MG/ML
40 INJECTION SUBCUTANEOUS DAILY
Status: DISCONTINUED | OUTPATIENT
Start: 2022-11-11 | End: 2022-11-11 | Stop reason: HOSPADM

## 2022-11-10 RX ORDER — ACETAMINOPHEN 325 MG/1
650 TABLET ORAL EVERY 4 HOURS PRN
Status: DISCONTINUED | OUTPATIENT
Start: 2022-11-10 | End: 2022-11-11 | Stop reason: HOSPADM

## 2022-11-10 RX ORDER — ROCURONIUM BROMIDE 10 MG/ML
INJECTION, SOLUTION INTRAVENOUS PRN
Status: DISCONTINUED | OUTPATIENT
Start: 2022-11-10 | End: 2022-11-10 | Stop reason: SDUPTHER

## 2022-11-10 RX ORDER — GLYCOPYRROLATE 0.2 MG/ML
INJECTION INTRAMUSCULAR; INTRAVENOUS PRN
Status: DISCONTINUED | OUTPATIENT
Start: 2022-11-10 | End: 2022-11-10 | Stop reason: SDUPTHER

## 2022-11-10 RX ORDER — AMLODIPINE BESYLATE 10 MG/1
10 TABLET ORAL DAILY
Status: DISCONTINUED | OUTPATIENT
Start: 2022-11-11 | End: 2022-11-11 | Stop reason: HOSPADM

## 2022-11-10 RX ORDER — DEXAMETHASONE SODIUM PHOSPHATE 10 MG/ML
INJECTION INTRAMUSCULAR; INTRAVENOUS PRN
Status: DISCONTINUED | OUTPATIENT
Start: 2022-11-10 | End: 2022-11-10 | Stop reason: SDUPTHER

## 2022-11-10 RX ORDER — SODIUM CHLORIDE 0.9 % (FLUSH) 0.9 %
5-40 SYRINGE (ML) INJECTION EVERY 12 HOURS SCHEDULED
Status: DISCONTINUED | OUTPATIENT
Start: 2022-11-10 | End: 2022-11-10 | Stop reason: HOSPADM

## 2022-11-10 RX ORDER — LIDOCAINE HYDROCHLORIDE 20 MG/ML
INJECTION, SOLUTION INTRAVENOUS PRN
Status: DISCONTINUED | OUTPATIENT
Start: 2022-11-10 | End: 2022-11-10 | Stop reason: SDUPTHER

## 2022-11-10 RX ORDER — PROTAMINE SULFATE 10 MG/ML
INJECTION, SOLUTION INTRAVENOUS PRN
Status: DISCONTINUED | OUTPATIENT
Start: 2022-11-10 | End: 2022-11-10 | Stop reason: SDUPTHER

## 2022-11-10 RX ORDER — ALBUTEROL SULFATE 1.25 MG/3ML
1.25 SOLUTION RESPIRATORY (INHALATION) EVERY 6 HOURS PRN
Status: DISCONTINUED | OUTPATIENT
Start: 2022-11-10 | End: 2022-11-11 | Stop reason: HOSPADM

## 2022-11-10 RX ORDER — SODIUM CHLORIDE 0.9 % (FLUSH) 0.9 %
5-40 SYRINGE (ML) INJECTION PRN
Status: DISCONTINUED | OUTPATIENT
Start: 2022-11-10 | End: 2022-11-10 | Stop reason: HOSPADM

## 2022-11-10 RX ORDER — ONDANSETRON 2 MG/ML
INJECTION INTRAMUSCULAR; INTRAVENOUS PRN
Status: DISCONTINUED | OUTPATIENT
Start: 2022-11-10 | End: 2022-11-10 | Stop reason: SDUPTHER

## 2022-11-10 RX ORDER — ASPIRIN 81 MG/1
81 TABLET ORAL DAILY
Status: DISCONTINUED | OUTPATIENT
Start: 2022-11-11 | End: 2022-11-11 | Stop reason: HOSPADM

## 2022-11-10 RX ORDER — MEPERIDINE HYDROCHLORIDE 25 MG/ML
12.5 INJECTION INTRAMUSCULAR; INTRAVENOUS; SUBCUTANEOUS
Status: DISCONTINUED | OUTPATIENT
Start: 2022-11-10 | End: 2022-11-10 | Stop reason: HOSPADM

## 2022-11-10 RX ORDER — ONDANSETRON 2 MG/ML
4 INJECTION INTRAMUSCULAR; INTRAVENOUS
Status: DISCONTINUED | OUTPATIENT
Start: 2022-11-10 | End: 2022-11-10 | Stop reason: HOSPADM

## 2022-11-10 RX ORDER — LABETALOL HYDROCHLORIDE 5 MG/ML
INJECTION, SOLUTION INTRAVENOUS
Status: DISCONTINUED
Start: 2022-11-10 | End: 2022-11-10 | Stop reason: WASHOUT

## 2022-11-10 RX ADMIN — SODIUM CHLORIDE, POTASSIUM CHLORIDE, SODIUM LACTATE AND CALCIUM CHLORIDE: 600; 310; 30; 20 INJECTION, SOLUTION INTRAVENOUS at 23:23

## 2022-11-10 RX ADMIN — SODIUM CHLORIDE: 9 INJECTION, SOLUTION INTRAVENOUS at 07:28

## 2022-11-10 RX ADMIN — CEFAZOLIN 2000 MG: 2 INJECTION, POWDER, FOR SOLUTION INTRAMUSCULAR; INTRAVENOUS at 16:04

## 2022-11-10 RX ADMIN — HYDROMORPHONE HYDROCHLORIDE 0.5 MG: 1 INJECTION, SOLUTION INTRAMUSCULAR; INTRAVENOUS; SUBCUTANEOUS at 11:07

## 2022-11-10 RX ADMIN — HEPARIN SODIUM 4000 UNITS: 1000 INJECTION INTRAVENOUS; SUBCUTANEOUS at 08:36

## 2022-11-10 RX ADMIN — ROCURONIUM BROMIDE 50 MG: 10 INJECTION, SOLUTION INTRAVENOUS at 07:36

## 2022-11-10 RX ADMIN — IPRATROPIUM BROMIDE AND ALBUTEROL SULFATE 1 AMPULE: 2.5; .5 SOLUTION RESPIRATORY (INHALATION) at 10:15

## 2022-11-10 RX ADMIN — PROPOFOL 200 MG: 10 INJECTION, EMULSION INTRAVENOUS at 07:36

## 2022-11-10 RX ADMIN — Medication 100 MCG: at 08:20

## 2022-11-10 RX ADMIN — SODIUM CHLORIDE: 9 INJECTION, SOLUTION INTRAVENOUS at 06:17

## 2022-11-10 RX ADMIN — HYDROMORPHONE HYDROCHLORIDE 0.5 MG: 1 INJECTION, SOLUTION INTRAMUSCULAR; INTRAVENOUS; SUBCUTANEOUS at 10:51

## 2022-11-10 RX ADMIN — Medication 5 MG: at 08:48

## 2022-11-10 RX ADMIN — LABETALOL HYDROCHLORIDE 10 MG: 5 INJECTION INTRAVENOUS at 09:47

## 2022-11-10 RX ADMIN — Medication 3 MG: at 09:44

## 2022-11-10 RX ADMIN — SODIUM CHLORIDE, PRESERVATIVE FREE 10 ML: 5 INJECTION INTRAVENOUS at 20:17

## 2022-11-10 RX ADMIN — ONDANSETRON 4 MG: 2 INJECTION INTRAMUSCULAR; INTRAVENOUS at 07:49

## 2022-11-10 RX ADMIN — FENTANYL CITRATE 50 MCG: 50 INJECTION, SOLUTION INTRAMUSCULAR; INTRAVENOUS at 08:13

## 2022-11-10 RX ADMIN — ROCURONIUM BROMIDE 30 MG: 10 INJECTION, SOLUTION INTRAVENOUS at 08:12

## 2022-11-10 RX ADMIN — Medication 10 MG: at 08:23

## 2022-11-10 RX ADMIN — FENTANYL CITRATE 100 MCG: 50 INJECTION, SOLUTION INTRAMUSCULAR; INTRAVENOUS at 07:36

## 2022-11-10 RX ADMIN — MIDAZOLAM 2 MG: 1 INJECTION INTRAMUSCULAR; INTRAVENOUS at 10:07

## 2022-11-10 RX ADMIN — SODIUM CHLORIDE: 9 INJECTION, SOLUTION INTRAVENOUS at 07:46

## 2022-11-10 RX ADMIN — SODIUM CHLORIDE, POTASSIUM CHLORIDE, SODIUM LACTATE AND CALCIUM CHLORIDE: 600; 310; 30; 20 INJECTION, SOLUTION INTRAVENOUS at 15:25

## 2022-11-10 RX ADMIN — CEFAZOLIN 2000 MG: 2 INJECTION, POWDER, FOR SOLUTION INTRAMUSCULAR; INTRAVENOUS at 07:32

## 2022-11-10 RX ADMIN — HEPARIN SODIUM 12000 UNITS: 1000 INJECTION INTRAVENOUS; SUBCUTANEOUS at 08:28

## 2022-11-10 RX ADMIN — GLYCOPYRROLATE 0.6 MG: 0.2 INJECTION INTRAMUSCULAR; INTRAVENOUS at 09:44

## 2022-11-10 RX ADMIN — DEXAMETHASONE SODIUM PHOSPHATE 10 MG: 10 INJECTION INTRAMUSCULAR; INTRAVENOUS at 07:46

## 2022-11-10 RX ADMIN — SODIUM CHLORIDE 5 MG/HR: 9 INJECTION, SOLUTION INTRAVENOUS at 14:56

## 2022-11-10 RX ADMIN — LIDOCAINE HYDROCHLORIDE 100 MG: 20 INJECTION, SOLUTION INTRAVENOUS at 07:36

## 2022-11-10 RX ADMIN — ACETAMINOPHEN 650 MG: 325 TABLET, FILM COATED ORAL at 16:32

## 2022-11-10 RX ADMIN — LABETALOL HYDROCHLORIDE 10 MG: 5 INJECTION INTRAVENOUS at 10:06

## 2022-11-10 RX ADMIN — CEFAZOLIN 2000 MG: 2 INJECTION, POWDER, FOR SOLUTION INTRAMUSCULAR; INTRAVENOUS at 23:23

## 2022-11-10 RX ADMIN — MIDAZOLAM 2 MG: 1 INJECTION INTRAMUSCULAR; INTRAVENOUS at 07:30

## 2022-11-10 RX ADMIN — PROTAMINE SULFATE 50 MG: 10 INJECTION, SOLUTION INTRAVENOUS at 09:30

## 2022-11-10 RX ADMIN — LABETALOL HYDROCHLORIDE 5 MG: 5 INJECTION INTRAVENOUS at 09:35

## 2022-11-10 RX ADMIN — SODIUM CHLORIDE: 9 INJECTION, SOLUTION INTRAVENOUS at 09:18

## 2022-11-10 RX ADMIN — FENTANYL CITRATE 100 MCG: 50 INJECTION, SOLUTION INTRAMUSCULAR; INTRAVENOUS at 07:43

## 2022-11-10 ASSESSMENT — PAIN DESCRIPTION - ORIENTATION
ORIENTATION: RIGHT;LEFT
ORIENTATION: RIGHT;LEFT
ORIENTATION: RIGHT
ORIENTATION: RIGHT;LEFT

## 2022-11-10 ASSESSMENT — PAIN SCALES - GENERAL
PAINLEVEL_OUTOF10: 0
PAINLEVEL_OUTOF10: 3
PAINLEVEL_OUTOF10: 0
PAINLEVEL_OUTOF10: 5
PAINLEVEL_OUTOF10: 8
PAINLEVEL_OUTOF10: 8
PAINLEVEL_OUTOF10: 3
PAINLEVEL_OUTOF10: 0
PAINLEVEL_OUTOF10: 0

## 2022-11-10 ASSESSMENT — PAIN DESCRIPTION - LOCATION
LOCATION: GROIN
LOCATION: HEAD

## 2022-11-10 ASSESSMENT — PAIN DESCRIPTION - DESCRIPTORS
DESCRIPTORS: ACHING
DESCRIPTORS: THROBBING
DESCRIPTORS: ACHING

## 2022-11-10 ASSESSMENT — PAIN - FUNCTIONAL ASSESSMENT: PAIN_FUNCTIONAL_ASSESSMENT: 0-10

## 2022-11-10 ASSESSMENT — PAIN DESCRIPTION - PAIN TYPE
TYPE: SURGICAL PAIN

## 2022-11-10 ASSESSMENT — PAIN DESCRIPTION - FREQUENCY
FREQUENCY: CONTINUOUS

## 2022-11-10 NOTE — PROGRESS NOTES
CVICU Admission Note    Name: Juan Diego Billings  MRN: [id number]    CC: Postoperative Critical Care Management     Indication for Surgery/Procedure: AAA without rupture   LVEF:   Normal   RVF:  Normal     Important/Relevant PMH/PSH: HTN, HPL, Hepatitis C, ICH, former smoker     Procedure/Surgeries: 11/10/2022 ENDOVASCULAR REPAIR ABDOMINAL AORTIC ANEURYSM WITH STENT GRAFT  bilateral femoral artery access under US guidance and closure using perclose technique  Bilateral transfemoral aortic catheter placement  EVAR using modular stent graft with 2 docking limb (C3) and Cuff      Physical Exam:    BP (!) 166/84   Pulse 69   Temp 97.6 °F (36.4 °C) (Temporal)   Resp 26   Ht 6' 1\" (1.854 m)   Wt 241 lb (109.3 kg)   SpO2 98%   BMI 31.80 kg/m²     General Appearance: Pt seen in PACU, drowsy OVALLE  to command. Hemodynamically stable. Eyes: PERRL  Pulmonary:  No wheezes, no accessory muscle use noted   Cardiovascular: RRR, no heaves or thrills palpated   Telemetry: SR  Abdomen: Soft, nondistended   Extremities: Palpable pulse all extremities, no edema   Neurologic/Psych: OVALLE x4 to command  Skin: Warm and dry    Incision: Bilateral groin puncture sites well approximated. Soft. Assessment/Plan: Day of Surgery     1.  AAA S/p EVAR    - Frequent neurovascular checks, monitor incision for signs of swelling/hematoma   - NPO, IVF @125cc/hr  - Moncada catheter to GD  - Bedrest  - Perioperative Ancef  - PRN dilaudid for pain management  - Maintain SBP <160, start Nicardipine gtt if needed         Electronically signed by BRITTNEY Fernandez - CNP on 11/10/2022 at 10:35 AM

## 2022-11-10 NOTE — ANESTHESIA POSTPROCEDURE EVALUATION
Department of Anesthesiology  Postprocedure Note    Patient: Yamila Colón  MRN: [id number]  YOB: 1955  Date of evaluation: 11/10/2022      Procedure Summary     Date: 11/10/22 Room / Location: [facility] OR 03 / [facility]    Anesthesia Start: 0730 Anesthesia Stop: 9531    Procedure: ENDOVASCULAR REPAIR ABDOMINAL AORTIC ANEURYSM WITH STENT GRAFT (Bilateral: Groin) Diagnosis:       Abdominal aortic aneurysm without rupture      (Abdominal aortic aneurysm without rupture [I71.40])    Surgeons: Rico Ro MD Responsible Provider: Amol Siegel DO    Anesthesia Type: general ASA Status: 3          Anesthesia Type: No value filed.     Mayito Phase I: Mayito Score: 9    Mayito Phase II:        Anesthesia Post Evaluation    Patient location during evaluation: PACU  Patient participation: complete - patient participated  Level of consciousness: awake and alert  Airway patency: patent  Nausea & Vomiting: no nausea and no vomiting  Complications: no  Cardiovascular status: blood pressure returned to baseline  Respiratory status: acceptable  Hydration status: euvolemic  Multimodal analgesia pain management approach

## 2022-11-10 NOTE — H&P
Vascular Surgery History & Physical Exam    Chief Complaint: Hx AAA    HISTORY OF PRESENT ILLNESS:    The patient is a 74 y.o. male who presents to the hospital for elective repair of an assx 5.1 cm AAA. Past Medical History:   Diagnosis Date    Hepatitis C     Hyperlipidemia     Hypertension     Intracranial hemorrhage ([address]      Past Surgical History:   Procedure Laterality Date    BACK SURGERY      BRONCHOSCOPY N/A 8/29/2022    BRONCHOSCOPY BIOPSY BRONCHUS performed by Jian Arteaga MD at [facility] ENDOSCOPY     Current Medications:     Current Facility-Administered Medications:     sodium chloride flush 0.9 % injection 5-40 mL, 5-40 mL, IntraVENous, 2 times per day, Lana E Buxman, PA-C    sodium chloride flush 0.9 % injection 5-40 mL, 5-40 mL, IntraVENous, PRN, Lana E Buxman, PA-C    0.9 % sodium chloride infusion, , IntraVENous, PRN, Lana E Buxman, PA-C, Last Rate: 25 mL/hr at 11/10/22 0617, New Bag at 11/10/22 0617    ceFAZolin (ANCEF) 2,000 mg in sterile water 20 mL IV syringe, 2,000 mg, IntraVENous, On Call to OR, Lana Berryxjazmyne PA-C  Allergies:  Patient has no known allergies. Social History     Socioeconomic History    Marital status:      Spouse name: Not on file    Number of children: Not on file    Years of education: Not on file    Highest education level: Not on file   Occupational History    Not on file   Tobacco Use    Smoking status: Every Day     Packs/day: 0.50     Years: 40.00     Pack years: 20.00     Types: Cigarettes     Start date: 28    Smokeless tobacco: Never   Vaping Use    Vaping Use: Never used   Substance and Sexual Activity    Alcohol use:  Yes     Alcohol/week: 3.0 standard drinks     Types: 3 Cans of beer per week     Comment: daily    Drug use: No    Sexual activity: Not on file   Other Topics Concern    Not on file   Social History Narrative    ** Merged History Encounter **          Social Determinants of Health     Financial Resource Strain: Not on file Food Insecurity: Not on file   Transportation Needs: Not on file   Physical Activity: Not on file   Stress: Not on file   Social Connections: Not on file   Intimate Partner Violence: Not on file   Housing Stability: Not on file     History reviewed. No pertinent family history. ROS : All others Negative if blank [], Positive if [x]  General   [] Fevers   [] Chills   [] Weight Loss   Skin   [] Tissue Loss   Eyes   [] Wears Glasses/Contacts   [] Vision Changes   Respiratory    [] Shortness of breath   Cardiovascular   [] Chest Pain   [] Shortness of breath with exertion   Gastrointestinal   [] Abdominal Pain      PHYSICAL EXAM:    Vitals:    11/10/22 0550   BP: (!) 170/85   Pulse: 76   Resp: 20   Temp: 98.1 °F (36.7 °C)   SpO2: 96%     CONSTITUTIONAL:  awake, alert, cooperative, no apparent distress, and appears stated age  NECK:  supple, symmetrical, trachea midline  LUNGS:  no increased work of breathing, good resp excursion  CARDIOVASCULAR:  S1S2  ABDOMEN:  soft, non-distended and non-tenderl   R LE Fem 2+, Biphasic DP and PT  L LE Fem 2+, Biphasic DP and PT  LABS:    Lab Results   Component Value Date    WBC 8.2 11/03/2022    HGB 14.7 11/03/2022    HCT 42.7 11/03/2022     11/03/2022    PROTIME 11.5 11/03/2022    INR 1.1 11/03/2022    APTT 37.8 (H) 08/27/2022    K 4.0 11/03/2022    BUN 11 11/03/2022    CREATININE 0.7 11/03/2022       Assesment:  Assx 5.1 cm AAA  PLAN:   I reviewed the procedure with the patient and family as available. I discussed the procedure, risks, benefits, complications, and alternatives of the procedure. They understand and consent.   All questions were answered  plan for EVAR possible open    Luci Regalado MD
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
## 2022-11-10 NOTE — ANESTHESIA PROCEDURE NOTES
Arterial Line:    An arterial line was placed using surface landmarks, in the OR for the following indication(s): continuous blood pressure monitoring and blood sampling needed. A 20 gauge (size), 1 and 3/8 inch (length), Arrow (type) catheter was placed, Seldinger technique not used, into the right radial artery, secured by tape. Anesthesia type: Local  Local infiltration: Injection    Events:  patient tolerated procedure well with no complications. Resident/CRNA: BRITTNEY Dash - CRNA  Other anesthesia staff: Jorge L Jones RN  Performed:  Other anesthesia staff   Preanesthetic Checklist  Completed: patient identified, IV checked, site marked, risks and benefits discussed, surgical/procedural consents, equipment checked, pre-op evaluation, timeout performed, anesthesia consent given, oxygen available and monitors applied/VS acknowledged

## 2022-11-10 NOTE — OP NOTE
Operative Note      Patient: Adrienne Mesa  YOB: 1955  MRN: [id number]    Date of Procedure: 11/10/2022    Pre-Op Diagnosis: Abdominal aortic aneurysm without rupture [I71.40]    Post-Op Diagnosis: Same       Procedure(s):  ENDOVASCULAR REPAIR ABDOMINAL AORTIC ANEURYSM WITH STENT GRAFT  bilateral femoral artery access under US guidance and closure using perclose technique  Bilateral transfemoral aortic catheter placement  EVAR using modular stent graft with 2 docking limb (C3) and Cuff    Main Body   Right [id number]   Right limb extension  23x12   Aortic cuff 32x4   Contralateral limb Left 23x14     Surgeon(s):  Alejandra Martell MD    Assistant:   Surgical Assistant: Becca Gentile CNP    Anesthesia: General    Estimated Blood Loss (mL): less than 50     Complications: None    Specimens:   * No specimens in log *    Implants:  Implant Name Type Inv.  Item Serial No.  Lot No. LRB No. Used Action   GRAFT EVAR L13CM AORT [id number] IL DIA14.5MM IPSILATERAL LEG - [id number] Endografts GRAFT EVAR L13CM AORT [id number] IL DIA14.5MM IPSILATERAL LEG [id number] [employer]   1 Implanted   GRAFT EVAR L14CM [id number] CONTRALATERAL LEG FOR AAA EXCLUDER - [id number] Endografts GRAFT EVAR L14CM [id number] CONTRALATERAL LEG FOR AAA EXCLUDER [id number] [employer]  Left 1 Implanted   GRAFT EVAR L12CM DST [id number] FEP NIT CONTRALATERAL LEG IL - [id number] Endografts GRAFT EVAR L12CM DST [id number] FEP NIT CONTRALATERAL LEG [place] [id number] [employer]  Right 1 Implanted   GRAFT EVAR L4.5CM OD32MM [id number] AORT EXT FOR AAA - [id number] Endografts GRAFT EVAR L4.5CM OD32MM [id number] AORT EXT FOR AAA [id number] [employer]  Right 1 Implanted         Drains:   Urinary Catheter 11/10/22 2 Way (Active)     Findings: no obvious endoleak, bilateral internal iliac patent    DESCRIPTION OF PROCEDURE: The patient was identified and the procedure was alexander. There was no evidence of an endovascular leak. The sheaths were removed over wires without hemodynamic compromise. The perclose was completed bilaterally using the knot pusher. Biphasic dp and pt were appreciated in both feet, and then the patient was given protamine and hemostasis was obtained. The incisions were irrigated with antibiotic saline solution. The incisions were approximated with multiple layers of Vicryl suture and Dermabond was applied to the skin incisions in the operating room. Needle, sponge and instrument counts were reported correct x2. The patient tolerated the procedure and was transferred to the CV-ICU in satisfactory condition. Ricardo Kay CNP was scrubbed and participated in the entire procedure including incision, exposure, anastomosis, and closure. There was no qualified general surgery resident available.      Hyun Genao MD    CC : Catia Parker MD        Electronically signed by Hyun Genao MD on 11/10/2022 at 9:41 AM

## 2022-11-11 VITALS
BODY MASS INDEX: 31.94 KG/M2 | HEIGHT: 73 IN | RESPIRATION RATE: 13 BRPM | HEART RATE: 94 BPM | SYSTOLIC BLOOD PRESSURE: 140 MMHG | OXYGEN SATURATION: 96 % | WEIGHT: 241 LBS | DIASTOLIC BLOOD PRESSURE: 87 MMHG | TEMPERATURE: 99.3 F

## 2022-11-11 LAB
ANION GAP SERPL CALCULATED.3IONS-SCNC: 10 MMOL/L (ref 7–16)
BUN BLDV-MCNC: 10 MG/DL (ref 6–23)
CALCIUM SERPL-MCNC: 9 MG/DL (ref 8.6–10.2)
CHLORIDE BLD-SCNC: 102 MMOL/L (ref 98–107)
CO2: 24 MMOL/L (ref 22–29)
CREAT SERPL-MCNC: 0.7 MG/DL (ref 0.7–1.2)
GFR SERPL CREATININE-BSD FRML MDRD: >60 ML/MIN/1.73
GLUCOSE BLD-MCNC: 112 MG/DL (ref 74–99)
HCT VFR BLD CALC: 37.7 % (ref 37–54)
HEMOGLOBIN: 12.8 G/DL (ref 12.5–16.5)
MCH RBC QN AUTO: 30.2 PG (ref 26–35)
MCHC RBC AUTO-ENTMCNC: 34 % (ref 32–34.5)
MCV RBC AUTO: 88.9 FL (ref 80–99.9)
PDW BLD-RTO: 15 FL (ref 11.5–15)
PLATELET # BLD: 321 E9/L (ref 130–450)
PMV BLD AUTO: 8.8 FL (ref 7–12)
POC ACT LR: 173 SECONDS
POC ACT LR: 174 SECONDS
POC ACT LR: 290 SECONDS
POC ACT LR: 311 SECONDS
POC ACT LR: 345 SECONDS
POTASSIUM SERPL-SCNC: 4.1 MMOL/L (ref 3.5–5)
RBC # BLD: 4.24 E12/L (ref 3.8–5.8)
SODIUM BLD-SCNC: 136 MMOL/L (ref 132–146)
WBC # BLD: 15.5 E9/L (ref 4.5–11.5)

## 2022-11-11 PROCEDURE — 85027 COMPLETE CBC AUTOMATED: CPT

## 2022-11-11 PROCEDURE — 6360000002 HC RX W HCPCS: Performed by: NURSE PRACTITIONER

## 2022-11-11 PROCEDURE — 2700000000 HC OXYGEN THERAPY PER DAY

## 2022-11-11 PROCEDURE — 80048 BASIC METABOLIC PNL TOTAL CA: CPT

## 2022-11-11 PROCEDURE — 37799 UNLISTED PX VASCULAR SURGERY: CPT

## 2022-11-11 PROCEDURE — 2580000003 HC RX 258: Performed by: NURSE PRACTITIONER

## 2022-11-11 PROCEDURE — 36415 COLL VENOUS BLD VENIPUNCTURE: CPT

## 2022-11-11 PROCEDURE — 6370000000 HC RX 637 (ALT 250 FOR IP): Performed by: NURSE PRACTITIONER

## 2022-11-11 RX ORDER — OXYCODONE HYDROCHLORIDE 5 MG/1
5 TABLET ORAL EVERY 6 HOURS PRN
Status: DISCONTINUED | OUTPATIENT
Start: 2022-11-11 | End: 2022-11-11 | Stop reason: HOSPADM

## 2022-11-11 RX ORDER — OXYCODONE HYDROCHLORIDE AND ACETAMINOPHEN 5; 325 MG/1; MG/1
1 TABLET ORAL EVERY 6 HOURS PRN
Qty: 20 TABLET | Refills: 0 | Status: SHIPPED | OUTPATIENT
Start: 2022-11-11 | End: 2022-11-16

## 2022-11-11 RX ORDER — OXYCODONE HYDROCHLORIDE AND ACETAMINOPHEN 5; 325 MG/1; MG/1
1 TABLET ORAL EVERY 6 HOURS PRN
Qty: 20 TABLET | Refills: 0 | Status: SHIPPED | OUTPATIENT
Start: 2022-11-11 | End: 2022-11-11 | Stop reason: SDUPTHER

## 2022-11-11 RX ORDER — ASPIRIN 81 MG/1
81 TABLET ORAL DAILY
Qty: 30 TABLET | Refills: 3 | Status: SHIPPED | OUTPATIENT
Start: 2022-11-11

## 2022-11-11 RX ADMIN — PANTOPRAZOLE SODIUM 40 MG: 40 TABLET, DELAYED RELEASE ORAL at 08:29

## 2022-11-11 RX ADMIN — LISINOPRIL 10 MG: 10 TABLET ORAL at 08:29

## 2022-11-11 RX ADMIN — AMLODIPINE BESYLATE 10 MG: 10 TABLET ORAL at 08:29

## 2022-11-11 RX ADMIN — CLOPIDOGREL BISULFATE 75 MG: 75 TABLET ORAL at 08:29

## 2022-11-11 RX ADMIN — SODIUM CHLORIDE, PRESERVATIVE FREE 10 ML: 5 INJECTION INTRAVENOUS at 08:29

## 2022-11-11 RX ADMIN — ENOXAPARIN SODIUM 40 MG: 100 INJECTION SUBCUTANEOUS at 08:29

## 2022-11-11 RX ADMIN — ASPIRIN 81 MG: 81 TABLET, COATED ORAL at 08:29

## 2022-11-11 RX ADMIN — ATORVASTATIN CALCIUM 40 MG: 40 TABLET, FILM COATED ORAL at 08:29

## 2022-11-11 ASSESSMENT — PAIN SCALES - GENERAL
PAINLEVEL_OUTOF10: 0

## 2022-11-11 NOTE — PROGRESS NOTES
Vascular Surgery Progress Note    Pt is being seen in f/u today regarding EVAR 11/10    Subjective  Pt s/e. Doing well, no pain. Still requiring cardene for pressure control but will restart home meds today.     Current Medications:    sodium chloride      niCARdipine 7.5 mg/hr (11/11/22 0604)      albuterol, sodium chloride flush, sodium chloride, HYDROmorphone, ondansetron, acetaminophen    amLODIPine  10 mg Oral Daily    clopidogrel  75 mg Oral Daily    atorvastatin  40 mg Oral Daily    lisinopril  10 mg Oral Daily    pantoprazole  40 mg Oral Daily    sodium chloride flush  5-40 mL IntraVENous 2 times per day    enoxaparin  40 mg SubCUTAneous Daily    aspirin  81 mg Oral Daily        PHYSICAL EXAM:    BP (!) 156/80   Pulse 77   Temp 99.3 °F (37.4 °C) (Temporal)   Resp 22   Ht 6' 1\" (1.854 m)   Wt 241 lb (109.3 kg)   SpO2 95%   BMI 31.80 kg/m²     Intake/Output Summary (Last 24 hours) at 11/11/2022 0732  Last data filed at 11/11/2022 0700  Gross per 24 hour   Intake 3872.14 ml   Output 3235 ml   Net 637.14 ml          Gen: awake, alert and oriented x3, no apparent distress  CVS: RRR  Resp: No increased work of breathing  Abd: Soft, non-tender, non-distended  R LE: palpable PT/DP, no groin hematoma   L LE: palpable DP/PT, no groin hematoma     LABS:    Lab Results   Component Value Date    WBC 15.5 (H) 11/11/2022    HGB 12.8 11/11/2022    HCT 37.7 11/11/2022     11/11/2022    PROTIME 11.5 11/03/2022    INR 1.1 11/03/2022    APTT 37.8 (H) 08/27/2022    K 4.1 11/11/2022    BUN 10 11/11/2022    CREATININE 0.7 11/11/2022       A/P  74 y.o. male with AAA s/p EVAR 11/10    - remove peres  - ambulate  - ok for diet  - once home meds resumed, cardene should be able to wean off  - once cardene off, remove arterial line and transfer out of ICU      Rashad Owens MD

## 2022-11-11 NOTE — PROGRESS NOTES
CVICU Progress Note    Name: Merna Smith  MRN: [id number]    CC: Postoperative Critical Care Management      Indication for Surgery/Procedure: AAA without rupture   LVEF:   Normal   RVF:  Normal      Important/Relevant PMH/PSH: HTN, HPL, Hepatitis C, ICH, former smoker      Procedure/Surgeries: 11/10/2022 ENDOVASCULAR REPAIR ABDOMINAL AORTIC ANEURYSM WITH STENT GRAFT  bilateral femoral artery access under US guidance and closure using perclose technique  Bilateral transfemoral aortic catheter placement  EVAR using modular stent graft with 2 docking limb (C3) and Cuff        Intake/Output Summary (Last 24 hours) at 11/11/2022 0827  Last data filed at 11/11/2022 0700  Gross per 24 hour   Intake 3872.14 ml   Output 3235 ml   Net 637.14 ml       Recent Labs     11/10/22  1604 11/11/22  0400   WBC 8.5 15.5*   HGB 14.6 12.8   HCT 42.7 37.7    321      Lab Results   Component Value Date/Time     11/11/2022 04:00 AM    K 4.1 11/11/2022 04:00 AM    K 4.0 11/03/2022 08:50 AM     11/11/2022 04:00 AM    CO2 24 11/11/2022 04:00 AM    BUN 10 11/11/2022 04:00 AM    CREATININE 0.7 11/11/2022 04:00 AM    GLUCOSE 112 11/11/2022 04:00 AM    CALCIUM 9.0 11/11/2022 04:00 AM         Physical Exam:    BP (!) 156/80   Pulse 75   Temp 99.3 °F (37.4 °C) (Temporal)   Resp 21   Ht 6' 1\" (1.854 m)   Wt 241 lb (109.3 kg)   SpO2 95%   BMI 31.80 kg/m²       General: Awake, alert. On Nicardipine gtt for HTN   Eyes: PERRL, anicteric   Pulmonary: No wheezes, no accessory muscle use noted   Cardiovascular:  RRR, no heaves or thrills on palpation  Tele: SR 80s  Abdomen: Soft, nontender, + BS   Extremities: Palpable pulses all extremities, no edema   Neurologic/Psych: A&Ox3, OVALLE to command   Skin: Warm and dry  Incisions: Bilateral groin puncture sites well approximated, soft       Assessment/Plan: POD #1    1.  AAA S/p EVAR    - Continue neurovascular checks  - Advance diet, stop IVF   - Moncada removed, monitor for void   - OOBTC< ambulate with nursing   - PRN oxycodone for pain management  - HTN-- On Nicardipine gtt, start home antihypertensives, wean gtt off   - Now on Room air, tolerating.         Dispo: Discharge home later today, follow up with Dr. Dariana Donato in 2 weeks     Electronically signed by BRITTNEY Valentin CNP on 11/11/2022 at 8:27 AM

## 2022-11-11 NOTE — CARE COORDINATION
Social Work Discharge Planning:  SW met with patient at bedside. Patient lives with spouse in tri-level home. Patient bed and bath are on the first floor. Patient independent prior to admission no DME. Patient has no hx with [facility], and he relayed he doesn't feel he will need it at discharge, SW encouraged and offered to leave [address], pt still declined. His PCP is Dr. Lucrecia Pratt at the [facility] on [facility] and pharmacy is the [facility]. Patient family will transport the patient home. SW will continue to follow and assist with transition of care.   Electronically signed by SHANITA Acevedo on 11/10/2022 at 7:57 PM

## 2022-11-11 NOTE — PROGRESS NOTES
Vascular Surgery Progress Note    Pt is being seen in f/u today regarding EVAR    Subjective  Pt s/e. Not having any pain. Tolerating diet ok. Moncada out and urinating. BP better controlled with home medications and cardene gtt off. Wants to go home.     Current Medications:    sodium chloride      niCARdipine 7.5 mg/hr (11/11/22 0604)      oxyCODONE, albuterol, sodium chloride flush, sodium chloride, HYDROmorphone, ondansetron, acetaminophen    amLODIPine  10 mg Oral Daily    clopidogrel  75 mg Oral Daily    atorvastatin  40 mg Oral Daily    lisinopril  10 mg Oral Daily    pantoprazole  40 mg Oral Daily    sodium chloride flush  5-40 mL IntraVENous 2 times per day    enoxaparin  40 mg SubCUTAneous Daily    aspirin  81 mg Oral Daily      PHYSICAL EXAM:    BP (!) 156/80   Pulse 75   Temp 99.3 °F (37.4 °C) (Temporal)   Resp 21   Ht 6' 1\" (1.854 m)   Wt 241 lb (109.3 kg)   SpO2 95%   BMI 31.80 kg/m²     Intake/Output Summary (Last 24 hours) at 11/11/2022 1106  Last data filed at 11/11/2022 0800  Gross per 24 hour   Intake 2372.14 ml   Output 3435 ml   Net -1062.86 ml        Gen Awake, alert, oriented x3, in no apparent distress   CVS S1S2   Resp No increased work of breathing   Abd Soft, non-tender, non-distended   R LE Groin incision c/d/I, soft, no hematoma, slight ecchymosis   Fem 2+, DP 1+, PT biphasic  L LE Groin incision c/d/I, soft, no hematoma, slight ecchymosis   Fem 2+, DP/PT biphasic  LABS:    Lab Results   Component Value Date    WBC 15.5 (H) 11/11/2022    HGB 12.8 11/11/2022    HCT 37.7 11/11/2022     11/11/2022    PROTIME 11.5 11/03/2022    INR 1.1 11/03/2022    APTT 37.8 (H) 08/27/2022    K 4.1 11/11/2022    BUN 10 11/11/2022    CREATININE 0.7 11/11/2022     A/P AAA without rupture, s/p EVAR 11/10  Monitor b/l fem incisions - stable  BP controlled with home PO antihypertensives  Diet advanced - tolerating diet  Moncada removed and pt urinating  Needs to ambulate then can discharge home today  Follow up in the office in 3 weeks - will arrange    BRITTNEY Samuels - CNP

## 2022-11-11 NOTE — DISCHARGE INSTRUCTIONS
Discharge Instructions for Endovascular Repair of Abdominal Aortic Aneurysm       Call Dr. Zena Litten office [phone] for follow-up appointment. During endovascular repair, the doctor guides catheters through both groin arteries up to the aortic aneurysm in the abdomen. A stent graft is placed in the weakened area to strengthen it. Steps to Take   Home Care    Follow these guidelines after surgery:   Rest. Try to move as tolerated. A mix of rest and light activity improves healing. The incision area may be sore for a few days. To minimize pain and soreness: Take pain medicine as directed. Avoid strenuous activity and heavy lifting. Keep the incision area clean and dry. Follow your doctor's instructions for changing the bandages, such as:   Wash your hands thoroughly. Cleanse the site with lukewarm water and mild soap. Use a soft wash cloth to gently wipe the incision area. Gently sponge bathe or shower. Do not scrub the incision areas. Avoid baths or swimming for one week. Diet    You can return to your regular diet. You may work with a dietician who will help you follow a heart-healthy diet. Physical Activity    You will feel sore after the surgery. Try to walk steadily within two weeks. You may be able to return to normal activities within 1-3 weeks. While recovering, you will need to avoid strenuous activities, like heavy lifting. Ask your doctor when you will be able to return to work. Do not drive unless your doctor has given you permission to do so. Medications    Your doctor may recommend:   Over-the-counter or prescription pain medicine   Aspirin or a cholesterol-lowering drug to prevent complications   If you had to stop medicines before the procedure, ask your doctor when you can start again.  Medicines that may have been stopped include:   Anti-inflammatory drugs (eg, aspirin, ibuprofen)   Blood thinners, like warfarin (Coumadin)   Clopidogrel (Plavix)   When taking medicines:   Take your medicine as directed. Do not change the amount or the schedule. Do not stop taking them without talking to your doctor. Do not share them. Know what results and side effects to look for. Report them to your doctor. Some drugs can be dangerous when mixed. Talk to a doctor or pharmacist if you are taking more than one drug. This includes over-the-counter medicines and herb or dietary supplements. Plan ahead for refills so you do not run out. Lifestyle Changes    You and your doctor will plan lifestyle changes that will help you recover. Some things to keep in mind include: Atherosclerosis and high blood pressure should be carefully managed. This can be done with medicines and a healthy lifestyle. If you smoke, talk to your doctor about quitting. Follow-up   To check for stent graft problems, schedule regular follow-up testing as directed by your doctor. Be sure to go to all of your appointments. Call Your Doctor If Any of the Following Occurs   After you leave the hospital, call your doctor if any of the following occurs:   Redness, swelling, increasing pain, excessive bleeding, or discharge at the incision site   Signs of infection, including fever and chills   New abdominal pain   Back pain   Any change of color or sensation in your legs or feet   Burning, pain, or other problems when urinating   Nausea or vomiting   Abdominal cramps or diarrhea   Unusual fatigue or depression   Disorientation or confusion   Numbness or tingling in the legs   New, unexplained symptoms   Cough   Call 911 or go to the emergency room right away if you have:   Shortness of breath   Chest pain   If you think you have an emergency,  CALL 911.

## 2022-11-11 NOTE — PLAN OF CARE
Problem: Discharge Planning  Goal: Discharge to home or other facility with appropriate resources  Outcome: Progressing     Problem: Safety - Adult  Goal: Free from fall injury  Outcome: Progressing     Problem: Pain  Goal: Verbalizes/displays adequate comfort level or baseline comfort level  Outcome: Progressing     Problem: Skin/Tissue Integrity  Goal: Absence of new skin breakdown  Description: 1. Monitor for areas of redness and/or skin breakdown  2. Assess vascular access sites hourly  3. Every 4-6 hours minimum:  Change oxygen saturation probe site  4. Every 4-6 hours:  If on nasal continuous positive airway pressure, respiratory therapy assess nares and determine need for appliance change or resting period.   Outcome: Progressing     Problem: Cardiovascular - Adult  Goal: Maintains optimal cardiac output and hemodynamic stability  Outcome: Progressing  Goal: Absence of cardiac dysrhythmias or at baseline  Outcome: Progressing

## 2022-11-18 LAB
BLOOD BANK DISPENSE STATUS: NORMAL
BLOOD BANK PRODUCT CODE: NORMAL
BPU ID: NORMAL
DESCRIPTION BLOOD BANK: NORMAL

## 2022-11-28 ENCOUNTER — OFFICE VISIT (OUTPATIENT)
Dept: VASCULAR SURGERY | Age: 67
End: 2022-11-28

## 2022-11-28 ENCOUNTER — TELEPHONE (OUTPATIENT)
Dept: VASCULAR SURGERY | Age: 67
End: 2022-11-28

## 2022-11-28 DIAGNOSIS — Z98.890 STATUS POST ENDOVASCULAR ANEURYSM REPAIR (EVAR): ICD-10-CM

## 2022-11-28 DIAGNOSIS — I71.43 INFRARENAL ABDOMINAL AORTIC ANEURYSM (AAA) WITHOUT RUPTURE: Primary | ICD-10-CM

## 2022-11-28 DIAGNOSIS — Z86.79 STATUS POST ENDOVASCULAR ANEURYSM REPAIR (EVAR): ICD-10-CM

## 2022-11-28 NOTE — PROGRESS NOTES
Vascular Surgery Outpatient Followup    PCP : Robin Rosado MD    Previous Vascular Procedures  11/10/22 EVAR, perc  2 docking limb (C3) and Aortic Cuff         HISTORY OF PRESENT ILLNESS:    The patient is a 74 y.o. male who is here in regards to f/u of his 5.1 cm AAA s/p evar. Post procedure he denies any pain, complications, incisions healing well. Patient denies hx of abdominal or back pain. Pt was admitted to the hospital on 8/27/22 with chest pain. Work up revealed he had left sided infiltrate and effusion, as well as left upper lobe collapse. He also had a large loculated pleural effusion. A CTA revealed an incidental finding of 5.1 cm AAA.     Past Medical History:        Diagnosis Date    Hepatitis C     Hyperlipidemia     Hypertension     Intracranial hemorrhage ([address]      Past Surgical History:        Procedure Laterality Date    ABDOMINAL AORTIC ANEURYSM REPAIR, ENDOVASCULAR Bilateral 11/10/2022    ENDOVASCULAR REPAIR ABDOMINAL AORTIC ANEURYSM WITH STENT GRAFT performed by Tamar Clemens MD at [facility] 8/29/2022    BRONCHOSCOPY BIOPSY BRONCHUS performed by Henry Frankel, MD at [facility] ENDOSCOPY     Current Medications:   Current Outpatient Medications   Medication Sig Dispense Refill    aspirin 81 MG EC tablet Take 1 tablet by mouth daily 30 tablet 3    lisinopril (PRINIVIL;ZESTRIL) 10 MG tablet Take 10 mg by mouth daily      Misc Natural Products (URINOZINC PO) Take by mouth daily      pantoprazole (PROTONIX) 40 MG tablet Take 40 mg by mouth daily      clopidogrel (PLAVIX) 75 MG tablet Take 1 tablet by mouth daily 30 tablet 0    albuterol sulfate HFA (VENTOLIN HFA) 108 (90 Base) MCG/ACT inhaler Inhale 2 puffs into the lungs 4 times daily as needed for Wheezing 18 g 5    atorvastatin (LIPITOR) 40 MG tablet Take 1 tablet by mouth daily 30 tablet 3    amLODIPine (NORVASC) 10 MG tablet Take 10 mg by mouth daily       No current facility-administered medications for this visit. Allergies:  Patient has no known allergies. Social History     Socioeconomic History    Marital status:      Spouse name: Not on file    Number of children: Not on file    Years of education: Not on file    Highest education level: Not on file   Occupational History    Not on file   Tobacco Use    Smoking status: Every Day     Packs/day: 0.50     Years: 40.00     Pack years: 20.00     Types: Cigarettes     Start date: 28    Smokeless tobacco: Never   Vaping Use    Vaping Use: Never used   Substance and Sexual Activity    Alcohol use: Yes     Alcohol/week: 3.0 standard drinks     Types: 3 Cans of beer per week     Comment: daily    Drug use: No    Sexual activity: Not on file   Other Topics Concern    Not on file   Social History Narrative    ** Merged History Encounter **          Social Determinants of Health     Financial Resource Strain: Not on file   Food Insecurity: Not on file   Transportation Needs: Not on file   Physical Activity: Not on file   Stress: Not on file   Social Connections: Not on file   Intimate Partner Violence: Not on file   Housing Stability: Not on file     No family history on file.   - negative for AAA  Labs  Lab Results   Component Value Date    WBC 15.5 (H) 11/11/2022    HGB 12.8 11/11/2022    HCT 37.7 11/11/2022     11/11/2022    PROTIME 11.5 11/03/2022    INR 1.1 11/03/2022    APTT 37.8 (H) 08/27/2022    K 4.1 11/11/2022    BUN 10 11/11/2022    CREATININE 0.7 11/11/2022     PHYSICAL EXAM:    CONSTITUTIONAL:  awake, alert, cooperative  PSYCHIATRIC :  Oriented to time, place and person     Appropriate insight to disease process  EYES: Lids and lashes normal  ENT:  External ears and nose without lesions   Hearing deficits not noted  NECK:  supple, symmetrical, trachea midline  LUNGS:  No increased work of breathing                 Clear to auscultation  CARDIOVASCULAR:  regular rate and rhythm   ABDOMEN:   Soft, non-distended, non-tender    Aorta is palpable  SKIN:  normal skin color, texture, turgor  EXTREMITIES:   R LE Groin incision c/d/I, no hematoma  Edema absent  L LE Groin incision c/d/I, no hematoma  Edema absent  R posterior tibial biphasic L posterior tibial biphasic   R dorsalis pedis biphasic L dorsalis pedis biphasic     RADIOLOGY:    A/P Assx 5.1 cm Abdominal Aortic Aneurysm s/p EVAR 11/10/22  Wounds healing well  Neurovascular stable  Continue medical management with plavix, and statin  Emphasized importance of Tobacco cessation  He is still smoking 5-6 cigarettes per day. Has cut down from 2 ppd.   Discussed with patient pathophysiology of AAA, risk of rupture and all ?s answered  Discussed with patient symptoms of abdominal pain, back pain and need to go to ER immediately if they if any symptoms developed  Will get CTA in next 1 month or so  Assuming stable to improved will obtain repeat imaging in 6 months and fu visit    Joel Cardona MD

## 2022-11-28 NOTE — TELEPHONE ENCOUNTER
Scheduled CTA abd/pelvis at [facility] ([facility]) 12/2/22 at 3:30 pm, pt notified and instructed.

## 2022-12-05 ENCOUNTER — HOSPITAL ENCOUNTER (OUTPATIENT)
Dept: CT IMAGING | Age: 67
Discharge: HOME | End: 2022-12-07
Payer: MEDICARE

## 2022-12-05 DIAGNOSIS — Z98.890 STATUS POST ENDOVASCULAR ANEURYSM REPAIR (EVAR): ICD-10-CM

## 2022-12-05 DIAGNOSIS — I71.43 INFRARENAL ABDOMINAL AORTIC ANEURYSM (AAA) WITHOUT RUPTURE: ICD-10-CM

## 2022-12-05 DIAGNOSIS — Z86.79 STATUS POST ENDOVASCULAR ANEURYSM REPAIR (EVAR): ICD-10-CM

## 2022-12-05 PROCEDURE — 74174 CTA ABD&PLVS W/CONTRAST: CPT

## 2022-12-05 PROCEDURE — 6360000004 HC RX CONTRAST MEDICATION: Performed by: RADIOLOGY

## 2022-12-05 RX ADMIN — IOPAMIDOL 90 ML: 755 INJECTION, SOLUTION INTRAVENOUS at 15:18

## 2022-12-21 ENCOUNTER — HOSPITAL ENCOUNTER (OUTPATIENT)
Age: 67
Discharge: HOME | End: 2022-12-21
Payer: MEDICARE

## 2022-12-21 ENCOUNTER — HOSPITAL ENCOUNTER (OUTPATIENT)
Dept: CT IMAGING | Age: 67
Discharge: HOME | End: 2022-12-21
Payer: MEDICARE

## 2022-12-21 DIAGNOSIS — R91.8 OTHER NONSPECIFIC ABNORMAL FINDING OF LUNG FIELD: ICD-10-CM

## 2022-12-21 LAB
BUN BLDV-MCNC: 11 MG/DL (ref 6–23)
CREAT SERPL-MCNC: 0.8 MG/DL (ref 0.7–1.2)
GFR SERPL CREATININE-BSD FRML MDRD: >60 ML/MIN/1.73

## 2022-12-21 PROCEDURE — 6360000004 HC RX CONTRAST MEDICATION: Performed by: RADIOLOGY

## 2022-12-21 PROCEDURE — 82565 ASSAY OF CREATININE: CPT

## 2022-12-21 PROCEDURE — 36415 COLL VENOUS BLD VENIPUNCTURE: CPT

## 2022-12-21 PROCEDURE — 71260 CT THORAX DX C+: CPT

## 2022-12-21 PROCEDURE — 84520 ASSAY OF UREA NITROGEN: CPT

## 2022-12-21 RX ADMIN — IOPAMIDOL 75 ML: 755 INJECTION, SOLUTION INTRAVENOUS at 08:32

## 2022-12-22 ENCOUNTER — TELEPHONE (OUTPATIENT)
Dept: CASE MANAGEMENT | Age: 67
End: 2022-12-22

## 2022-12-22 ENCOUNTER — HOSPITAL ENCOUNTER (OUTPATIENT)
Dept: INFUSION THERAPY | Age: 67
Discharge: HOME | End: 2022-12-22

## 2022-12-22 ENCOUNTER — OFFICE VISIT (OUTPATIENT)
Dept: ONCOLOGY | Age: 67
End: 2022-12-22
Payer: MEDICARE

## 2022-12-22 ENCOUNTER — TELEPHONE (OUTPATIENT)
Dept: ONCOLOGY | Age: 67
End: 2022-12-22

## 2022-12-22 ENCOUNTER — TELEPHONE (OUTPATIENT)
Dept: INFUSION THERAPY | Age: 67
End: 2022-12-22

## 2022-12-22 VITALS
DIASTOLIC BLOOD PRESSURE: 90 MMHG | HEART RATE: 79 BPM | BODY MASS INDEX: 32.23 KG/M2 | TEMPERATURE: 99 F | HEIGHT: 73 IN | SYSTOLIC BLOOD PRESSURE: 180 MMHG | WEIGHT: 243.2 LBS | OXYGEN SATURATION: 97 %

## 2022-12-22 DIAGNOSIS — C34.12 MALIGNANT NEOPLASM OF UPPER LOBE OF LEFT LUNG (HCC): Primary | ICD-10-CM

## 2022-12-22 PROCEDURE — 99214 OFFICE O/P EST MOD 30 MIN: CPT

## 2022-12-22 NOTE — TELEPHONE ENCOUNTER
Met with patient and wife during the initial consult with Dr. Nisa Mackey for left lung cancer Introduced nurse navigator role, gave contact information and informed of other supportive services in clinic:  and nutrition. Patient is supported by his wife and denied issues with living arrangements, transportation, insurance and prescription coverage. He reported his appetite as good, denying unplanned weight loss. Patient smokes ~1/2 pack of cigarettes a day, is trying to quit on his own. Encouraged to stop, informed of [facility] Cessation Program and gave pamphlet. Provided patient with the [phone] [facility] scheduling number if they'd like to make their own brain MRI and PET scan appointments since they'll return to the clinic in a couple of weeks and department scheduling due to the holidays. Reviewed and provided patient with PET scan prep instructions, thoroughly reviewed all dietary instructions to follow 24 hours prior to the scan. Patient informed he'll be contacted by the pulmonology office for an EBUS appointment. He is scheduled to return to see Dr. Nisa Mackey on 01/05/2023 to discuss his plan of care. Informed patient all of his scans and pathology results need to be available for the appointment or it will need moved out 1 week. Discussed and gave patient the following materials: Patient Resource Lung Cancer booklet, pre-hab lung handout, Cancer Care Online support handout and outpatient counseling resource list. Patient denied further needs today, encouraged to call should any arise, he verbalized understanding.  Shahram Wilson RN, ADN, BSE, OCN Patient Nurse Navigator

## 2022-12-22 NOTE — TELEPHONE ENCOUNTER
SW met with pt during initial consult with Dr. Wally Esposito. Pt reported having a high balance at this time and was given information on financial aid. SW was joined by [employer], [employer], who helped to discuss documents that would be needed to file for financial aid to help assist with [employer]. Pt was encouraged to reach out should any other needs arise. Pt to bring in paperwork next week to complete financial aid paperwork.         Vanita Villagomez MSW, VONW-S  Oncology Social Worker No

## 2022-12-22 NOTE — PROGRESS NOTES
Suleman Richardsmagdiel  1955 74 y.o. Referring Physician:     PCP: Kate Moraes MD    Vitals:    22 0831   BP: (!) 180/90   Pulse: 79   Temp: 99 °F (37.2 °C)   SpO2: 97%        Wt Readings from Last 3 Encounters:   22 243 lb 3.2 oz (110.3 kg)   11/10/22 241 lb (109.3 kg)   10/28/22 241 lb (109.3 kg)        Body mass index is 32.09 kg/m². Chief Complaint:   Chief Complaint   Patient presents with    New Patient         Cancer Staging  No matching staging information was found for the patient. Prior Radiation Therapy? NO    Concurrent Chemo/radiation? NO    Prior Chemotherapy? NO    Prior Hormonal Therapy? NO    Head and Neck Cancer? No, patient does NOT have HN cancer.       LMP: na    Age at first Menses: na    : na    Para: na          Current Outpatient Medications:     aspirin 81 MG EC tablet, Take 1 tablet by mouth daily, Disp: 30 tablet, Rfl: 3    lisinopril (PRINIVIL;ZESTRIL) 10 MG tablet, Take 10 mg by mouth daily, Disp: , Rfl:     Misc Natural Products (URINOZINC PO), Take by mouth daily, Disp: , Rfl:     pantoprazole (PROTONIX) 40 MG tablet, Take 40 mg by mouth daily, Disp: , Rfl:     clopidogrel (PLAVIX) 75 MG tablet, Take 1 tablet by mouth daily, Disp: 30 tablet, Rfl: 0    albuterol sulfate HFA (VENTOLIN HFA) 108 (90 Base) MCG/ACT inhaler, Inhale 2 puffs into the lungs 4 times daily as needed for Wheezing, Disp: 18 g, Rfl: 5    atorvastatin (LIPITOR) 40 MG tablet, Take 1 tablet by mouth daily, Disp: 30 tablet, Rfl: 3    amLODIPine (NORVASC) 10 MG tablet, Take 10 mg by mouth daily, Disp: , Rfl:        Past Medical History:   Diagnosis Date    Hepatitis C     Hyperlipidemia     Hypertension     Intracranial hemorrhage (HCC)        Past Surgical History:   Procedure Laterality Date    ABDOMINAL AORTIC ANEURYSM REPAIR, ENDOVASCULAR Bilateral 11/10/2022    ENDOVASCULAR REPAIR ABDOMINAL AORTIC ANEURYSM WITH STENT GRAFT performed by Jerod Martin MD at [address] [place] [address] SURGERY      BRONCHOSCOPY N/A 8/29/2022    BRONCHOSCOPY BIOPSY BRONCHUS performed by Oliver Galo MD at [facility]       History reviewed. No pertinent family history. Social History     Socioeconomic History    Marital status:      Spouse name: Not on file    Number of children: Not on file    Years of education: Not on file    Highest education level: Not on file   Occupational History    Not on file   Tobacco Use    Smoking status: Every Day     Packs/day: 0.50     Years: 40.00     Pack years: 20.00     Types: Cigarettes     Start date: 28    Smokeless tobacco: Never   Vaping Use    Vaping Use: Never used   Substance and Sexual Activity    Alcohol use: Yes     Alcohol/week: 3.0 standard drinks     Types: 3 Cans of beer per week     Comment: daily    Drug use: No    Sexual activity: Not on file   Other Topics Concern    Not on file   Social History Narrative    ** Merged History Encounter **          Social Determinants of Health     Financial Resource Strain: Not on file   Food Insecurity: Not on file   Transportation Needs: Not on file   Physical Activity: Not on file   Stress: Not on file   Social Connections: Not on file   Intimate Partner Violence: Not on file   Housing Stability: Not on file           Occupation: /  Retired:  YES: Patient is retired from [employer]. 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 REVIEW OF SYSTEMS:     Pacemaker/Defibulator/ICD:  No    Mediport: No           FALLS RISK SCREENING ASSESSMENT    Instructions:  Assess the patient and [place] the appropriate indicators that are present for fall risk identification. Total the numbers circled and assign a fall risk score from Table 2.  Reassess patient at a minimum every 12 weeks or with status change. Assessment   Date  12/22/2022     1. Mental Ability: confusion/cognitively impaired No - 0       2. Elimination Issues: incontinence, frequency No - 0       3.   Ambulatory: use of assistive devices (walker, cane, off-loading devices), attached to equipment (IV pole, oxygen) No - 0     4. Sensory Limitations: dizziness, vertigo, impaired vision No - 0       5. Age 72 years or greater - 1       10. Medication: diuretics, strong analgesics, hypnotics, sedatives, antihypertensive agents   Yes - 3   7. Falls:  recent history of falls within the last 3 months (not to include slipping or tripping)   No - 0   TOTAL 4    If score of 4 or greater was education given? Yes       TABLE 2   Risk Score Risk Level Plan of Care   0-3 Little or  No Risk 1. Provide assistance as indicated for ambulation activities  2. Reorient confused/cognitively impaired patient  3. Call-light/bell within patient's reach  4. Chair/bed in low position, stretcher/bed with siderails up except when performing patient care activities  5. Educate patient/family/caregiver on falls prevention  6.  Reassess in 12 weeks or with any noted change in patient condition which places them at a risk for a fall   4-6 Moderate Risk 1. Provide assistance as indicated for ambulation activities  2. Reorient confused/cognitively impaired patient  3. Call-light/bell within patient's reach  4. Chair/bed in low position, stretcher/bed with siderails up except when performing patient care activities  5. Educate patient/family/caregiver on falls prevention  6. Falls risk precaution (Yellow sticker Level II) placed on patient chart   7 or   Higher High Risk 1. Place patient in easily observable treatment room  2. Patient attended at all times by family member or staff  3. Provide assistance as indicated for ambulation activities  4. Reorient confused/cognitively impaired patient  5. Call-light/bell within patient's reach  6. Chair/bed in low position, stretcher/bed with siderails up except when performing patient care activities  7. Educate patient/family/caregiver on falls prevention  8.   Falls risk precaution (Yellow sticker Level III) placed on patient chart           MALNUTRITION RISK SCREENING ASSESSMENT    Instructions:  Assess the patient and enter the appropriate indicators that are present for nutrition risk identification. Total the numbers entered and assign a risk score. Follow the appropriate action for total score listed below. Assessment   Date  12/22/2022     Have you lost weight without trying? 0- No     Have you been eating poorly because of a decreased appetite? 0- No   3. Do you have a diagnosis of head and neck cancer? 0- No                                                                                    TOTAL 0        Score of 0-1: No action  Score 2 or greater:   For Non-Diabetic Patient: Recommend adding Ensure Enlive 2 x daily and provide patient with Ensure wellness bag with coupons  For Diabetic Patient: Recommend adding Glucerna Shake 2 x daily and provide patient with Glucerna Wellness bag with coupons  Route to the dietitian via [address]    Are you having  difficulty performing daily routine tasks  due to fatigue or weakness (ie: bathing/showering, dressing, housework, meal prep, work, child Alvah Cradle): No     Do you have any arm flexibility/ROM restrictions, swelling or pain that limit activity: No     Any changes in memory, attention/focus that impact daily activities: No     Do you avoid participation in leisure/social activity due weakness, fatigue or pain: No     ARE ANY OF THE ABOVE ARE ANSWERED YES: No          PT ASSESSMENT FOR REFERRAL    Have you had any recent falls in past 2 months: No     Do you have difficulty  going up/down stairs: No     Are you having difficulty walking: No     Do you often hold onto furniture/environmental supports or feel off balance when you are walking: No     Do you need to take rest breaks when you are walking: Yes     Any pain on scale of 1-10 that limits your mobility: No 0/10    ARE ANY OF THE ABOVE ARE ANSWERED YES: Yes - but NO PT referral request sent due to patient refusal. Encouraged to contact clinic if he'd like a referral.       PELVIC FLOOR DYSFUNCTION SCREENING ASSESSMENT    - Do you have any pelvic pain? No     - Do you have any fecal constipation or fecal incontinence? No     - Do you have any urinary incontinence or difficulty starting to urinate? No     ARE ANY OF THE ABOVE ARE ANSWERED YES: No           PREHAB AUDIOLOGY REFERRAL    - Is patient planned to receive Cisplatin? No. This patient is not planned to start Cisplatin. - Is patient complaining of new onset hearing loss? No. Patient is not complaining of new onset hearing loss.         Ben Vela, RN
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
## 2022-12-22 NOTE — TELEPHONE ENCOUNTER
Juan Diego Billings  12/22/2022  Wt Readings from Last 10 Encounters:   12/22/22 243 lb 3.2 oz (110.3 kg)   11/10/22 241 lb (109.3 kg)   10/28/22 241 lb (109.3 kg)   10/21/22 241 lb (109.3 kg)   10/10/22 230 lb (104.3 kg)   08/28/22 230 lb (104.3 kg)   08/27/22 230 lb (104.3 kg)   01/16/15 210 lb (95.3 kg)   01/07/15 219 lb (99.3 kg)   12/04/14 210 lb (95.3 kg)     Ht Readings from Last 1 Encounters:   12/22/22 6' 1\" (1.854 m)     BMI=32.09    Assessment: Visited with Jeaneth Lofton and his wife while in for new patient visit with Dr. Fletcher Trimble for lung cancer. Clairebarak Rolly reports his weight has been steady around 240# and maintained with eating his usually 1 meal (supper) and 1 snack per day. Denied N/V/D/C. Discussed his increased calorie needs for cancer diagnosis and importance of weight maintenance. Recommended adding at least 1 more meal per day and adding additional snack to meet his increased metabolic needs. Also mentioned ONS are an option if his weight or intake decrease. He goes to the [facility] office, if supplements are needed will place referral to them. Provided resources of \"Nutrition Therapy for Cancer Related Side Effects\" and \"Nutrition and Lung Cancer\". They were appreciative of information. Provided contact information for further questions or concerns. Weight change: Usual weight 240# reported by Jeaneth Lofton. 5.78% weight gain x 2 1/2 months per EMR. Appetite: Good appetite, unchanged per his usual 1 meal and 1 snack. Nutritional Side Effects: None  Calculated Needs: 20-22kcal/kg/MNC=[id number], 1.2-1.4gm/kg/ZPP=059-698ef protein, 1ml/kcal=2200-2400ml fluids  Malnutrition Status: At risk  Nutrition Diagnosis: At risk r/t lung cancer. Recommendations: Eat 2-3 meals per day with 2-3 snacks per day. ONS as needed for weight maintenance.      Marcus Whitfield RD

## 2022-12-29 ENCOUNTER — TELEPHONE (OUTPATIENT)
Dept: CASE MANAGEMENT | Age: 67
End: 2022-12-29

## 2022-12-29 NOTE — TELEPHONE ENCOUNTER
Received call back from patient's wife, she stated patient has an appointment at Dr. Linnea Monaco' office on 01/11/2023 and Dr. Didier Berry office at the [facility] on 01/18/2023. She said Dr. Didier Berry office wants to hear what Dr. Linnea Monaco has to say before they see patient. She said Dr. Razia Posadas is at that office 1 day a week so that's why the appointment is after 01/11/2023. Devin Ringer Dr. [place] appointment will be moved to 01/19/2023 so he has both consult notes. Informed the clinic  will call her with the new time, she verbalized understanding. Timothy Hauser  RN, ADN, BSE, OCN  Patient Nurse Navigator

## 2022-12-29 NOTE — TELEPHONE ENCOUNTER
Contacted patient's wife, Mey [place], with an update on appointments at the [facility] and with Dr Capri Fuller' office. Provided her both offices contact information asking her to call them to make the appointments, she verbalized understating. Reminded of the brain MRI scheduled for 01/03/2023 and PET scan on 01/06/2023, then follow up with Dr. Sunny Clark on 01/12/2023. Asked Mey [place] to call clinic when she has confirmed the appointments, she verbalized understanding. Dr. Sunny Clark was updated about appointments, he stated he'll still see patient on 01/12/2023 even if the EBUS hasn't been done yet.  Callie Gregg  RN, ADN, BSE, OCN  Patient Nurse Navigator

## 2023-01-03 ENCOUNTER — TELEPHONE (OUTPATIENT)
Dept: CASE MANAGEMENT | Age: 68
End: 2023-01-03

## 2023-01-03 ENCOUNTER — HOSPITAL ENCOUNTER (OUTPATIENT)
Dept: MRI IMAGING | Age: 68
Discharge: HOME | End: 2023-01-05
Payer: OTHER GOVERNMENT

## 2023-01-03 DIAGNOSIS — C34.12 MALIGNANT NEOPLASM OF UPPER LOBE OF LEFT LUNG (HCC): ICD-10-CM

## 2023-01-03 PROCEDURE — A9577 INJ MULTIHANCE: HCPCS | Performed by: RADIOLOGY

## 2023-01-03 PROCEDURE — 70553 MRI BRAIN STEM W/O & W/DYE: CPT

## 2023-01-03 PROCEDURE — 6360000004 HC RX CONTRAST MEDICATION: Performed by: RADIOLOGY

## 2023-01-03 RX ADMIN — GADOBENATE DIMEGLUMINE 20 ML: 529 INJECTION, SOLUTION INTRAVENOUS at 07:40

## 2023-01-03 NOTE — TELEPHONE ENCOUNTER
Patient's wife called stating patent received a prescription for Chantix from the [facility] and an order for a pulse oximeter from Dr. Nicolas Carpio' office. Reviewed patient's upcoming appointments, she verbalized understanding of all details and the return to clinic on 01/19/2023 to see Dr. Hilton Leventhal for the plan of care.  Benny Saldivar  RN, ADN, BSE, OCN  Patient Nurse Navigator

## 2023-01-06 ENCOUNTER — TELEPHONE (OUTPATIENT)
Dept: CASE MANAGEMENT | Age: 68
End: 2023-01-06

## 2023-01-06 ENCOUNTER — HOSPITAL ENCOUNTER (OUTPATIENT)
Dept: NUCLEAR MEDICINE | Age: 68
Discharge: HOME | End: 2023-01-06
Payer: OTHER GOVERNMENT

## 2023-01-06 DIAGNOSIS — C34.12 MALIGNANT NEOPLASM OF UPPER LOBE OF LEFT LUNG (HCC): ICD-10-CM

## 2023-01-06 NOTE — PROGRESS NOTES
CTA of Abd and Pelvis Reviewed    CT Review s/p EVAR    Device : [place]   Evidence of migration : No  Endoleak : No    Preop (mm)/Patency    Proximal Aorta 31 31   At Renals 26 25   R Renal artery higher patent   L Renal artery lower patent   AAA 51 51   Aortic Bifurcation 27 27   R Common Iliac 18 18   R External Iliac 8 8   R Internal Iliac patent patent   R Common Femoral 11 11   L Common Iliac 16 16   L External Iliac 8 8   L Internal Iliac patent patent   L Common Femoral 10 10     Will reimage in 6 months    Katie Pendleton MD

## 2023-01-07 PROCEDURE — 3430000000 HC RX DIAGNOSTIC RADIOPHARMACEUTICAL: Performed by: RADIOLOGY

## 2023-01-07 PROCEDURE — A9552 F18 FDG: HCPCS | Performed by: RADIOLOGY

## 2023-01-07 RX ORDER — FLUDEOXYGLUCOSE F 18 200 MCI/ML
10 INJECTION, SOLUTION INTRAVENOUS
Status: COMPLETED | OUTPATIENT
Start: 2023-01-07 | End: 2023-01-07

## 2023-01-07 RX ADMIN — FLUDEOXYGLUCOSE F 18 10 MILLICURIE: 200 INJECTION, SOLUTION INTRAVENOUS at 08:16

## 2023-01-09 NOTE — TELEPHONE ENCOUNTER
Late entry 01/06/2023:Patient's wife called stating patient received Chantix from the [employer] and stated she spoke with  Dr. Gabriel Saavedra office at the [facility] and they told her they needed a copy of patient's records. This nurse navigator contacted Dr. Gabriel Saavedra office, spoke with Marcellus Drummond, she said they need the operative report and pathology report from patient's EBUS. Informed her patient is scheduled for a pulmonology consult at Dr. Meghna Ascencio' office on 01/11/2023, then they'll schedule the procedure. Provided Dr. Meghna Ascencio' office number and , Sandra Carter, in case they needed it. Rafa Soler said she'll have access to the PET scan results and will re-schedule patient's appointment at their office once they have all results.  Benny Saldivar  RN, ADN, BSE, OCN  Patient Nurse Navigator

## 2023-01-10 ENCOUNTER — TELEPHONE (OUTPATIENT)
Dept: CASE MANAGEMENT | Age: 68
End: 2023-01-10

## 2023-01-10 NOTE — TELEPHONE ENCOUNTER
Patient's wife called leaving a message stating patient has a new prescription for a Spiriva inhaler and Dr. Qian Saba' office are requesting Esdras johns for their consult appointment on 01/11/2023. Contacted Dr. Qian Saba' office to discuss, left voice message and to call clinic at [phone]. Informed Dr. Kaya Magana office needs the EBUS operative report and pathology report, provided his office fax number and to contact nurse Karen Hoffman with any questions. Louis Sutton  RN, ADN, BSE, OCN  Patient Nurse Navigator

## 2023-01-11 ENCOUNTER — TELEPHONE (OUTPATIENT)
Dept: CASE MANAGEMENT | Age: 68
End: 2023-01-11

## 2023-01-11 RX ORDER — VARENICLINE TARTRATE 1 MG/1
1 TABLET, FILM COATED ORAL 2 TIMES DAILY
COMMUNITY
Start: 2022-12-30

## 2023-01-11 RX ORDER — VARENICLINE TARTRATE 0.5 MG/1
0.5 TABLET, FILM COATED ORAL DAILY
COMMUNITY
Start: 2022-12-30

## 2023-01-11 NOTE — TELEPHONE ENCOUNTER
Patient's wife called stating patient is scheduled for his bronchoscopy with EBUS on 01/17/2023 at [facility]. She asked if they need to keep their appointment with Dr. Camille Arriola at the [facility] on 01/18/2023. Informed his office would be called, updated and checked if the appointment would need moved out to when the pathology results were available. This nurse navigator contacted Dr. Jamshid Christianson' office, spoke with clemente, she said she had all the numbers for Dr. Farhad Hidalgo office to send requested information. This nurse navigator contacted Dr. Farhad Hidalgo office, spoke with ivelisse, informed the surgery is scheduled for 01/17/2023. She stated they'll still see patient on 01/18/2023. Attempted contacting patient's wife with an update to keep the 01/18/2023 appointment with Dr. Camille Arriola, no answer, left message to call clinic at [phone]. Marcelo Patel  RN, ADN, BSE, OCN  Patient Nurse Navigator

## 2023-01-11 NOTE — PROGRESS NOTES
[facility] PRE-ADMISSION TESTING   ENDOSCOPY/ COLONSCOPY INSTRUCTIONS  PAT- Phone Number: [phone]    ENDOSCOPY/ COLONSCOPY INSTRUCTIONS:     [] Bowel Prep instructions reviewed. [] Colonoscopy- The day prior: No solid foods. Clear liquids only. [x] Nothing by mouth after midnight. Including no gum, candy, mints, or water. [x] You may brush your teeth, gargle, but do NOT swallow water. [x] Do not wear makeup, lotions, powders, deodorant. [] Urine Pregnancy test will be preformed the day of surgery. A specimen sample may be brought from home. [x] Arrange transportation with a responsible adult  to and from the hospital. If you do not have a responsible adult  to transport you, you will need to make arrangements with a medical transportation company. Arrange for someone to be with you for the remainder of the day and for 24 hours after your procedure due to having had anesthesia. -Who will be your  for transportation? __wife, elias__  -Who will be staying with you for 24 hrs after your procedure? __wife, noraha________________    PARKING INSTRUCTIONS: if your surgery time changes, we will notify you the day before. [x] ARRIVAL DATE & TIME: 1/17 @ 1120  [x] Enter into the [employer]. Two people may accompany you. Masks are not required but are recommended. [x] Parking Lot \"I\" is where you will park. It is located on the corner of [address] and [address] [address]. The entrance is on [address]. Upon entering the parking lot, a voucher ticket will print. EDUCATION INSTRUCTIONS:    [x] Bring a complete list of your medications, please write the last time you took the medicine, give this list to the nurse in Pre-Op. [x] Take only the following medications the morning of surgery with 1-2 ounces of water: see list  [x] Stop all herbal supplements and vitamins 5 days before surgery. Stop NSAIDS 7 days before surgery.   [] DO NOT take any diabetic medicine the morning of surgery. Follow instructions for insulin the day before surgery. [] If you are diabetic and your blood sugar is low or you feel symptomatic, you may drink 1-2 ounces of apple juice or take a glucose tablet.            -The morning of your procedure, you may call the pre-op area if you have concerns about your blood sugar [phone]. [x] Use your inhalers the morning of surgery. Bring your emergency inhaler with you day of surgery. [x] Follow physician instructions regarding any blood thinners you may be taking. WHAT TO EXPECT:    [x] The day of your procedure you will be greeted and checked in by the [employer].  In addition, you will be registered in the [place] by a Patient Access Representative. Please bring your photo ID and insurance card. A nurse will greet you in accordance to the time you are needed in the pre-op area to prepare you for surgery. Please do not be discouraged if you are not greeted in the order you arrive as there are many variables that are involved in patient preparation. Your patience is greatly appreciated as you wait for your nurse. Please bring in items such as: books, magazines, newspapers, electronics, or any other items  to occupy your time in the waiting area. [x]  Delays may occur. Staff will make a sincere effort to keep you informed of delays. If any delays occur with your procedure, we apologize ahead of time for your inconvenience as we recognize the value of your time.

## 2023-01-12 ENCOUNTER — TELEPHONE (OUTPATIENT)
Dept: CASE MANAGEMENT | Age: 68
End: 2023-01-12

## 2023-01-12 NOTE — TELEPHONE ENCOUNTER
Patient's wife, Marjorie Iyer, called stating she spoke with the [facility] and patient is scheduled for testing and to see Dr. Sun Pulido on 02/01/2023. Informed her patient will be scheduled to see Dr. Fletcher Trimble on 02/02/2023 and the clinic  will contact her with the time, she verbalized understanding.  Kandis Nicolas  RN, ADN, BSE, OCN  Patient Nurse Navigator

## 2023-01-12 NOTE — TELEPHONE ENCOUNTER
Updated Dr. Deandre Perry that patient is having his EBUS is on 01/17/2023 with Dr. Nasima Pierce and consult with Dr. Holley Ríos at the [facility] on 01/18/2023. He stated he'll see patient after the pathology results are available and he sees Dr. Holley Ríos. Contacted patient's wife, Pb Sarmiento, informed they'll be called with an appointment for 01/26/2023 to  see Dr. Deandre Perry, but that may need moved out if patient doesn't see Dr. Holley Ríos prior to that day. Asked Marielle to call clinic if the [facility] consult is after 01/26/2023 so this clinic appointment can be adjusted, she verbalized understanding. Marielle said she called Dr. Peyton Mixon office twice since yesterday about changing the office visit due to her new job schedule and being able to afford the gas going to [facility] [email or web address]. She said she hasn't heard back from anyone yet. Encouraged her to call clinic tomorrow if she doesn't hear from them or if she needs assistance with a gas gift card for the trip. Updated the clinic . Jr Paez  RN, ADN, BSE, OCN  Patient Nurse Navigator

## 2023-01-16 ENCOUNTER — ANESTHESIA EVENT (OUTPATIENT)
Dept: ENDOSCOPY | Age: 68
End: 2023-01-16
Payer: MEDICARE

## 2023-01-17 ENCOUNTER — HOSPITAL ENCOUNTER (OUTPATIENT)
Age: 68
Setting detail: OUTPATIENT SURGERY
Discharge: HOME | End: 2023-01-17
Attending: INTERNAL MEDICINE | Admitting: INTERNAL MEDICINE
Payer: MEDICARE

## 2023-01-17 ENCOUNTER — APPOINTMENT (OUTPATIENT)
Dept: GENERAL RADIOLOGY | Age: 68
End: 2023-01-17
Attending: INTERNAL MEDICINE
Payer: MEDICARE

## 2023-01-17 ENCOUNTER — ANESTHESIA (OUTPATIENT)
Dept: ENDOSCOPY | Age: 68
End: 2023-01-17
Payer: MEDICARE

## 2023-01-17 VITALS
SYSTOLIC BLOOD PRESSURE: 157 MMHG | HEIGHT: 73 IN | HEART RATE: 81 BPM | OXYGEN SATURATION: 96 % | BODY MASS INDEX: 31.94 KG/M2 | DIASTOLIC BLOOD PRESSURE: 92 MMHG | RESPIRATION RATE: 16 BRPM | TEMPERATURE: 97 F | WEIGHT: 241 LBS

## 2023-01-17 DIAGNOSIS — C34.12 SQUAMOUS CELL CARCINOMA OF BRONCHUS IN LEFT UPPER LOBE (HCC): ICD-10-CM

## 2023-01-17 LAB
INR BLD: 1
PROTHROMBIN TIME: 11.4 SEC (ref 9.3–12.4)

## 2023-01-17 PROCEDURE — 6360000002 HC RX W HCPCS

## 2023-01-17 PROCEDURE — 85610 PROTHROMBIN TIME: CPT

## 2023-01-17 PROCEDURE — 2580000003 HC RX 258: Performed by: INTERNAL MEDICINE

## 2023-01-17 PROCEDURE — C1753 CATH, INTRAVAS ULTRASOUND: HCPCS | Performed by: INTERNAL MEDICINE

## 2023-01-17 PROCEDURE — 7100000010 HC PHASE II RECOVERY - FIRST 15 MIN: Performed by: INTERNAL MEDICINE

## 2023-01-17 PROCEDURE — 7100000001 HC PACU RECOVERY - ADDTL 15 MIN: Performed by: INTERNAL MEDICINE

## 2023-01-17 PROCEDURE — 3700000000 HC ANESTHESIA ATTENDED CARE: Performed by: INTERNAL MEDICINE

## 2023-01-17 PROCEDURE — 88305 TISSUE EXAM BY PATHOLOGIST: CPT

## 2023-01-17 PROCEDURE — 2500000003 HC RX 250 WO HCPCS

## 2023-01-17 PROCEDURE — 3609020000 HC BRONCHOSCOPY W/EBUS FNA: Performed by: INTERNAL MEDICINE

## 2023-01-17 PROCEDURE — 2720000010 HC SURG SUPPLY STERILE: Performed by: INTERNAL MEDICINE

## 2023-01-17 PROCEDURE — 2709999900 HC NON-CHARGEABLE SUPPLY: Performed by: INTERNAL MEDICINE

## 2023-01-17 PROCEDURE — 71045 X-RAY EXAM CHEST 1 VIEW: CPT

## 2023-01-17 PROCEDURE — 36415 COLL VENOUS BLD VENIPUNCTURE: CPT

## 2023-01-17 PROCEDURE — 3700000001 HC ADD 15 MINUTES (ANESTHESIA): Performed by: INTERNAL MEDICINE

## 2023-01-17 PROCEDURE — 7100000011 HC PHASE II RECOVERY - ADDTL 15 MIN: Performed by: INTERNAL MEDICINE

## 2023-01-17 PROCEDURE — 7100000000 HC PACU RECOVERY - FIRST 15 MIN: Performed by: INTERNAL MEDICINE

## 2023-01-17 PROCEDURE — 88173 CYTOPATH EVAL FNA REPORT: CPT

## 2023-01-17 RX ORDER — SUCCINYLCHOLINE/SOD CL,ISO/PF 200MG/10ML
SYRINGE (ML) INTRAVENOUS PRN
Status: DISCONTINUED | OUTPATIENT
Start: 2023-01-17 | End: 2023-01-17 | Stop reason: SDUPTHER

## 2023-01-17 RX ORDER — LIDOCAINE HYDROCHLORIDE 20 MG/ML
INJECTION, SOLUTION INTRAVENOUS PRN
Status: DISCONTINUED | OUTPATIENT
Start: 2023-01-17 | End: 2023-01-17 | Stop reason: SDUPTHER

## 2023-01-17 RX ORDER — PROPOFOL 10 MG/ML
INJECTION, EMULSION INTRAVENOUS PRN
Status: DISCONTINUED | OUTPATIENT
Start: 2023-01-17 | End: 2023-01-17 | Stop reason: SDUPTHER

## 2023-01-17 RX ORDER — IPRATROPIUM BROMIDE AND ALBUTEROL SULFATE 2.5; .5 MG/3ML; MG/3ML
1 SOLUTION RESPIRATORY (INHALATION)
Status: DISCONTINUED | OUTPATIENT
Start: 2023-01-17 | End: 2023-01-17 | Stop reason: HOSPADM

## 2023-01-17 RX ORDER — ONDANSETRON 2 MG/ML
4 INJECTION INTRAMUSCULAR; INTRAVENOUS
Status: DISCONTINUED | OUTPATIENT
Start: 2023-01-17 | End: 2023-01-17 | Stop reason: HOSPADM

## 2023-01-17 RX ORDER — SODIUM CHLORIDE 9 MG/ML
INJECTION, SOLUTION INTRAVENOUS CONTINUOUS
Status: DISCONTINUED | OUTPATIENT
Start: 2023-01-17 | End: 2023-01-17 | Stop reason: HOSPADM

## 2023-01-17 RX ORDER — FENTANYL CITRATE 50 UG/ML
INJECTION, SOLUTION INTRAMUSCULAR; INTRAVENOUS PRN
Status: DISCONTINUED | OUTPATIENT
Start: 2023-01-17 | End: 2023-01-17 | Stop reason: SDUPTHER

## 2023-01-17 RX ORDER — SODIUM CHLORIDE 9 MG/ML
INJECTION, SOLUTION INTRAVENOUS PRN
Status: DISCONTINUED | OUTPATIENT
Start: 2023-01-17 | End: 2023-01-17 | Stop reason: HOSPADM

## 2023-01-17 RX ORDER — SODIUM CHLORIDE 0.9 % (FLUSH) 0.9 %
5-40 SYRINGE (ML) INJECTION EVERY 12 HOURS SCHEDULED
Status: DISCONTINUED | OUTPATIENT
Start: 2023-01-17 | End: 2023-01-17 | Stop reason: HOSPADM

## 2023-01-17 RX ORDER — ONDANSETRON 2 MG/ML
INJECTION INTRAMUSCULAR; INTRAVENOUS PRN
Status: DISCONTINUED | OUTPATIENT
Start: 2023-01-17 | End: 2023-01-17 | Stop reason: SDUPTHER

## 2023-01-17 RX ORDER — SODIUM CHLORIDE 0.9 % (FLUSH) 0.9 %
5-40 SYRINGE (ML) INJECTION PRN
Status: DISCONTINUED | OUTPATIENT
Start: 2023-01-17 | End: 2023-01-17 | Stop reason: HOSPADM

## 2023-01-17 RX ORDER — DEXAMETHASONE SODIUM PHOSPHATE 10 MG/ML
INJECTION INTRAMUSCULAR; INTRAVENOUS PRN
Status: DISCONTINUED | OUTPATIENT
Start: 2023-01-17 | End: 2023-01-17 | Stop reason: SDUPTHER

## 2023-01-17 RX ORDER — MIDAZOLAM HYDROCHLORIDE 1 MG/ML
INJECTION INTRAMUSCULAR; INTRAVENOUS PRN
Status: DISCONTINUED | OUTPATIENT
Start: 2023-01-17 | End: 2023-01-17 | Stop reason: SDUPTHER

## 2023-01-17 RX ORDER — GLYCOPYRROLATE 0.2 MG/ML
INJECTION INTRAMUSCULAR; INTRAVENOUS PRN
Status: DISCONTINUED | OUTPATIENT
Start: 2023-01-17 | End: 2023-01-17 | Stop reason: SDUPTHER

## 2023-01-17 RX ORDER — ROCURONIUM BROMIDE 10 MG/ML
INJECTION, SOLUTION INTRAVENOUS PRN
Status: DISCONTINUED | OUTPATIENT
Start: 2023-01-17 | End: 2023-01-17 | Stop reason: SDUPTHER

## 2023-01-17 RX ADMIN — ROCURONIUM BROMIDE 30 MG: 10 INJECTION, SOLUTION INTRAVENOUS at 13:46

## 2023-01-17 RX ADMIN — SUGAMMADEX 437 MG: 100 INJECTION, SOLUTION INTRAVENOUS at 14:23

## 2023-01-17 RX ADMIN — FENTANYL CITRATE 50 MCG: 50 INJECTION, SOLUTION INTRAMUSCULAR; INTRAVENOUS at 13:59

## 2023-01-17 RX ADMIN — MIDAZOLAM 2 MG: 1 INJECTION INTRAMUSCULAR; INTRAVENOUS at 13:35

## 2023-01-17 RX ADMIN — Medication 200 MG: at 13:39

## 2023-01-17 RX ADMIN — SODIUM CHLORIDE: 9 INJECTION, SOLUTION INTRAVENOUS at 12:51

## 2023-01-17 RX ADMIN — PROPOFOL 200 MG: 10 INJECTION, EMULSION INTRAVENOUS at 13:39

## 2023-01-17 RX ADMIN — DEXAMETHASONE SODIUM PHOSPHATE 10 MG: 10 INJECTION INTRAMUSCULAR; INTRAVENOUS at 13:46

## 2023-01-17 RX ADMIN — SODIUM CHLORIDE: 9 INJECTION, SOLUTION INTRAVENOUS at 11:39

## 2023-01-17 RX ADMIN — LIDOCAINE HYDROCHLORIDE 50 MG: 20 INJECTION, SOLUTION INTRAVENOUS at 13:39

## 2023-01-17 RX ADMIN — ONDANSETRON 4 MG: 2 INJECTION INTRAMUSCULAR; INTRAVENOUS at 14:20

## 2023-01-17 RX ADMIN — FENTANYL CITRATE 50 MCG: 50 INJECTION, SOLUTION INTRAMUSCULAR; INTRAVENOUS at 14:24

## 2023-01-17 RX ADMIN — FENTANYL CITRATE 100 MCG: 50 INJECTION, SOLUTION INTRAMUSCULAR; INTRAVENOUS at 13:39

## 2023-01-17 RX ADMIN — GLYCOPYRROLATE 0.2 MG: 0.2 INJECTION INTRAMUSCULAR; INTRAVENOUS at 13:55

## 2023-01-17 ASSESSMENT — PAIN - FUNCTIONAL ASSESSMENT: PAIN_FUNCTIONAL_ASSESSMENT: 0-10

## 2023-01-17 NOTE — OP NOTE
[address] [place] [address]    Pulmonary/critical care procedure note    Flexible Fiberoptic Bronchoscopy NOTE    Patient: Anita Donovan    MRN: [id number]  : 1955    Date: 2023  Time: 2:26 PM     Primary Care Physician:      Yamilet Pride MD     None     [phone]    Laboratory Work:  Lab Results   Component Value Date    INR 1.0 2023    INR 1.1 2022    INR 1.2 2022    PROTIME 11.4 2023    PROTIME 11.5 2022    PROTIME 13.5 (H) 2022     Lab Results   Component Value Date    WBC 15.5 (H) 2022    HGB 12.8 2022    HCT 37.7 2022    MCV 88.9 2022     2022    LYMPHOPCT 17.4 (L) 2022    RBC 4.24 2022    MCH 30.2 2022    MCHC 34.0 2022    RDW 15.0 2022    NEUTOPHILPCT 65.8 2022    MONOPCT 14.3 (H) 2022    BASOPCT 0.4 2022    NEUTROABS 7.76 (H) 2022    LYMPHSABS 2.06 2022    MONOSABS 1.69 (H) 2022    EOSABS 0.14 2022    BASOSABS 0.05 2022     Lab Results   Component Value Date/Time     2022 04:00 AM    K 4.1 2022 04:00 AM    K 4.0 2022 08:50 AM     2022 04:00 AM    CO2 24 2022 04:00 AM    BUN 11 2022 07:48 AM    CREATININE 0.8 2022 07:48 AM    GLUCOSE 112 2022 04:00 AM    CALCIUM 9.0 2022 04:00 AM      Attending Physician: Dr. Anjana Vidales    Assistant(s): Anesthesia Dept, Cytology, Pathology, Endoscopy     Pre-Operative Medications: Per Anesthesia Dept     Intra-Operative Medications: Per Anesthesia Dept     Anesthesia: General Anesthesia    Pre-Procedure Diagnosis: Left Hilar Lymphadenopathy     Operation/Procedure:   Flexible Fiberoptic Bronchoscopy  EBUS Bronchoscopy with 1 Lymph Node Biopsy - Station 11 L x 5 Passes    Post-Procedure Diagnosis:   Left Hilar Lymphadenopathy, Positive for Malignant Cells    Obstructed ILENE and Lingula    Consent: Consent was obtained prior to procedure.  Indications, risks, benefits were explained at length. Indications: Left Hilar Lymphadenopathy     Purpose: Diagnostic, Therapeutic    Procedure Summary:   A time out was performed to confirm both patient and procedure. I had worn a gown with hat, mask, gloves prior to initiation of procedure. The patient was pre-medicated with medications per anesthesia dept. Intra-operatively, any additional medications were given under instruction of anesthesia dept. Bronchoscope was introduced via adapter at end of Et tube. The trachea was inspected and found to be normal.  The main laurent was sharp.     - The right bronchial tree was inspected and the following were noted:   [a] Right Upper Lobe contained three segments [apical segmental bronchus, posterior segmental bronchus, and anterior segmental bronchus] and was found to be normal.   [b] Bronchus Intermedius was examined and found to be normal.   [c] Right Middle Lobe had 2 segments [lateral segmental bronchus and medial segmental bronchus] and was found to be normal.   [d] Right Lower Lobe including apical segment and basal segmental branches [anterior basal branch, lateral basal branch, and posterior basal branch] was also inspected to the sub-segmental levels and was found to be normal.   [e] The right bronchial tree was found to be without stenosis, circumferential narrowing, intrinsic compressing mass, extrinsic compressing mass, obstruction in the area of the RUL, RML, RLL.     - The left bronchial tree was inspected and the following were noted:   [a] Left Upper Lobe was obstructed by large necrotic and fungating tumor. [b] Lingula was obstructed by large necrotic and fungating tumor.   [c] Left Lower Lobe with superior segment was inspected to sub-segmental level [anterior basal segment, lateral basal segment, and posterior basal segment] and found to be normal.   [d] The left bronchial tree was found to be without stenosis, circumferential narrowing, intrinsic compressing mass, extrinsic compressing mass, obstruction in the area of the ILENE, LINGULA, LLL. Chief differential diagnosis to include: malignancy     Patient tolerated the post-procedure recovery. Under EBUS, stations 4L, 4R, 10L, 10%, 7, 11R, 11L were all examined. Procedures Completed:  Flexible Fiberoptic Bronchoscopy  EBUS Bronchoscopy with 1 Lymph Node Biopsy - Station 11 L x 5 Passes    Balloon catheter was used in assistance of visualization during bronchoscopy and balloon catheter was removed intact at procedure completion. During the endoscopic procedure there were no/some tendencies towards: desaturations, hypotension, hypertension, bradycardia, tachycardia, dysrhythmia.     Subsequent chest x-ray was ordered post endoscopic procedure     Estimated Blood Loss: NONE    Complications: NONE     Tolerance: NONE    Patient extubated and send to PACU in stable condition hemodynamically    Gala Avendano MD on 1/17/2023 at 2:26 PM

## 2023-01-17 NOTE — ANESTHESIA POSTPROCEDURE EVALUATION
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     Mayito Phase I: Mayito Score: 8    Mayito Phase II:        Anesthesia Post Evaluation    Patient location during evaluation: PACU  Patient participation: complete - patient participated  Level of consciousness: awake  Pain score: 0  Airway patency: patent  Nausea & Vomiting: no nausea  Complications: no  Cardiovascular status: hemodynamically stable  Respiratory status: acceptable  Hydration status: stable

## 2023-01-17 NOTE — DISCHARGE INSTRUCTIONS
[address]    POST-BRONCHOSCOPY DISCHARGE INSTRUCTIONS:     GENERAL INFORMATION:  Bronchoscopy is the visual examination of the lungs and air passages, called bronchial tubes. The exam is performed with a bronchoscope, an instrument with a lighted tip. Bronchoscopy is also used to obtain tissue and secretion samples and to wash the tissues with saline. This is a procedure that can help a doctor diagnose cancer and/or an infection. Any tissue taken or saline rinse will be sent to a lab for analysis. The entire bronchoscopy will take less than one hour. Usually, you don't have to stay in the hospital, but an overnight stay may be necessary in some cases. Steps to Take Include the Following:    HOME CARE:  (1) If you had a biopsy, try not to cough or clear your throat. (2) Immediately after the procedure, spit out any saliva until your throat is no longer numb. (3) If you are a smoker, avoid smoking for 24 hours after your procedure. (4) You will probably have a hoarse voice and sore neck and throat for a few days after the bronchoscopy. DIET:  (1) When your throat muscles are working again, start with sips of water and slowly progress to solid foods and then your regular diet. (2) Avoid alcohol after your procedure, since you will still have sedatives in your system. (3) Eat and drink when your throat muscles are no longer numb. ACTIVITY:  (1) No driving, operating machinery, or making important (legal) decisions for 24 hours. (2) Resume normal activity after 24 hours. You may return to work after 24 hours. MEDICATIONS:  If you had to stop medicines before the procedure, ask your doctor when you can start again. Medicines often stopped include:  (1) Anti-inflammatory drugs (eg, aspirin )  (2) Blood thinners like clopidogrel (Plavix) or warfarin (Coumadin)    If your biopsy results show that you have an infection, appropriate antibiotic therapy will be prescribed to you.     IF YOU ARE TAKING MEDICATIONS, FOLLOW THESE GENERAL GUIDELINES:  (1) Take your usual medication as directed. (2) Do not change the amount or the schedule. (3) Do not stop taking them without talking to your doctor. (4) Do not share them. (5) Know what the results and side effects are of the medications taken and report them to your doctor. (6) Some drugs can be dangerous when mixed. (7) Talk to a doctor or pharmacist if you are taking more than one drug. This includes over-the-counter medication and herb or dietary supplements. (8) Plan ahead for refills so you don't run out. LIFESTYLE CHANGES:  You and your doctor will plan lifestyle changes that will aid in your recovery. Some points to keep in mind include:  (1) If cancer is found, you will be referred to an oncologist, a doctor who specializes in cancer. (2) If tuberculosis or another infection is found, antibiotic treatment will be started. POSSIBLE COMPLICATIONS INCLUDE (BUT NOT RESTRICTED TO):  (1) bleeding from the biopsy site    (2) a collapsed lung       (3) an irregular heart rate  (4) infection    IF ANY OF THE FOLLOWING OCCURS, CALL YOUR PHYSICIAN:  (1) Signs of infection, including fever and chills  (2) Cough, shortness of breath, or chest pain  (3) Coughing up more than a teaspoon (5 milliliters) of blood. Small amounts of blood may be noted immediately after the examination. This should gradually decrease in amount and the color change from bright red to dark red or brown. (4) Severe nausea or vomiting  (5) Increased or unusual wheezing  (6) Pain that you can't control with the medications you've been given    FOLLOW UP:  (1) Schedule a follow-up appointment as directed by your doctor. Please call the office for an appointment and/or further instructions. (2) Check with your doctor for your biopsy results, which should be available within a few days. Tuberculosis results can take as long as 6 weeks.   (3) Monitor your recovery once you leave the hospital. As soon as you notice any problem, alert your doctor. In case of an emergency, call 911 immediately. Thank you.     Rosie Kraft MD  1/17/2023  2:26 PM

## 2023-01-17 NOTE — PROGRESS NOTES
Dr Munir Hahn in perfect serve about cxr result okayed for discharge     Discharge instructions gone over with patient and patients wife all questions answered at this time
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
## 2023-01-23 PROBLEM — C80.1 MALIGNANT SMALL CELL CANCER (HCC): Status: ACTIVE | Noted: 2023-01-23

## 2023-01-25 ENCOUNTER — HOSPITAL ENCOUNTER (OUTPATIENT)
Dept: INFUSION THERAPY | Age: 68
End: 2023-01-25
Payer: OTHER GOVERNMENT

## 2023-01-25 ENCOUNTER — TELEPHONE (OUTPATIENT)
Dept: CASE MANAGEMENT | Age: 68
End: 2023-01-25

## 2023-01-25 DIAGNOSIS — C34.92 CARCINOMA OF LEFT LUNG (HCC): Primary | ICD-10-CM

## 2023-01-25 RX ORDER — PROCHLORPERAZINE MALEATE 10 MG
10 TABLET ORAL EVERY 6 HOURS PRN
Qty: 40 TABLET | Refills: 3 | Status: SHIPPED | OUTPATIENT
Start: 2023-01-25

## 2023-01-25 NOTE — TELEPHONE ENCOUNTER
Patient's wife called asking if patient needs to keep his appointment with the [facility] next week. Informed her when they see Dr. Madai Tiwari tomorrow they can ask him. She stated she's aware he needs to come in for chemo teaching, but they \"are in dark\" about the biopsy \"results and staging. \" Informed her chemo teaching will be done tomorrow after the office visit instead of today, she verbalized understanding. Informed FELICIANO Galindo patient needs audiology referral since receiving Cisplatin. Alejandra Pavon  RN, ADN, BSE, OCN  Patient Nurse Navigator

## 2023-01-26 ENCOUNTER — OFFICE VISIT (OUTPATIENT)
Dept: ONCOLOGY | Age: 68
End: 2023-01-26
Payer: MEDICARE

## 2023-01-26 ENCOUNTER — TELEPHONE (OUTPATIENT)
Dept: AUDIOLOGY | Age: 68
End: 2023-01-26

## 2023-01-26 ENCOUNTER — TELEPHONE (OUTPATIENT)
Dept: ONCOLOGY | Age: 68
End: 2023-01-26

## 2023-01-26 ENCOUNTER — APPOINTMENT (OUTPATIENT)
Dept: INFUSION THERAPY | Age: 68
End: 2023-01-26
Payer: OTHER GOVERNMENT

## 2023-01-26 ENCOUNTER — HOSPITAL ENCOUNTER (OUTPATIENT)
Dept: INFUSION THERAPY | Age: 68
Discharge: HOME | End: 2023-01-26
Payer: OTHER GOVERNMENT

## 2023-01-26 ENCOUNTER — TELEPHONE (OUTPATIENT)
Dept: CASE MANAGEMENT | Age: 68
End: 2023-01-26

## 2023-01-26 VITALS
TEMPERATURE: 98.9 F | DIASTOLIC BLOOD PRESSURE: 88 MMHG | BODY MASS INDEX: 32.38 KG/M2 | SYSTOLIC BLOOD PRESSURE: 156 MMHG | WEIGHT: 244.3 LBS | HEIGHT: 73 IN | OXYGEN SATURATION: 99 % | HEART RATE: 79 BPM

## 2023-01-26 DIAGNOSIS — C34.12 SMALL CELL LUNG CANCER, LEFT UPPER LOBE (HCC): Primary | ICD-10-CM

## 2023-01-26 DIAGNOSIS — C80.1 MALIGNANT SMALL CELL CANCER (HCC): ICD-10-CM

## 2023-01-26 DIAGNOSIS — C34.12 MALIGNANT NEOPLASM OF UPPER LOBE OF LEFT LUNG (HCC): ICD-10-CM

## 2023-01-26 LAB
ALBUMIN SERPL-MCNC: 4.1 G/DL (ref 3.5–5.2)
ALP BLD-CCNC: 74 U/L (ref 40–129)
ALT SERPL-CCNC: 26 U/L (ref 0–40)
ANION GAP SERPL CALCULATED.3IONS-SCNC: 10 MMOL/L (ref 7–16)
AST SERPL-CCNC: 21 U/L (ref 0–39)
BASOPHILS ABSOLUTE: 0.06 E9/L (ref 0–0.2)
BASOPHILS RELATIVE PERCENT: 0.5 % (ref 0–2)
BILIRUB SERPL-MCNC: 0.4 MG/DL (ref 0–1.2)
BUN BLDV-MCNC: 8 MG/DL (ref 6–23)
CALCIUM SERPL-MCNC: 8.9 MG/DL (ref 8.6–10.2)
CEA: 3.1 NG/ML (ref 0–5.2)
CHLORIDE BLD-SCNC: 97 MMOL/L (ref 98–107)
CO2: 26 MMOL/L (ref 22–29)
CREAT SERPL-MCNC: 0.7 MG/DL (ref 0.7–1.2)
EOSINOPHILS ABSOLUTE: 0.06 E9/L (ref 0.05–0.5)
EOSINOPHILS RELATIVE PERCENT: 0.5 % (ref 0–6)
GFR SERPL CREATININE-BSD FRML MDRD: >60 ML/MIN/1.73
GLUCOSE BLD-MCNC: 109 MG/DL (ref 74–99)
HCT VFR BLD CALC: 39.9 % (ref 37–54)
HEMOGLOBIN: 13.5 G/DL (ref 12.5–16.5)
IMMATURE GRANULOCYTES #: 0.09 E9/L
IMMATURE GRANULOCYTES %: 0.8 % (ref 0–5)
LYMPHOCYTES ABSOLUTE: 1.96 E9/L (ref 1.5–4)
LYMPHOCYTES RELATIVE PERCENT: 17.3 % (ref 20–42)
MAGNESIUM: 2.2 MG/DL (ref 1.6–2.6)
MCH RBC QN AUTO: 28.7 PG (ref 26–35)
MCHC RBC AUTO-ENTMCNC: 33.8 % (ref 32–34.5)
MCV RBC AUTO: 84.9 FL (ref 80–99.9)
MONOCYTES ABSOLUTE: 1.2 E9/L (ref 0.1–0.95)
MONOCYTES RELATIVE PERCENT: 10.6 % (ref 2–12)
NEUTROPHILS ABSOLUTE: 7.94 E9/L (ref 1.8–7.3)
NEUTROPHILS RELATIVE PERCENT: 70.3 % (ref 43–80)
PDW BLD-RTO: 13.2 FL (ref 11.5–15)
PHOSPHORUS: 2.9 MG/DL (ref 2.5–4.5)
PLATELET # BLD: 373 E9/L (ref 130–450)
PMV BLD AUTO: 8.2 FL (ref 7–12)
POTASSIUM SERPL-SCNC: 4.4 MMOL/L (ref 3.5–5)
RBC # BLD: 4.7 E12/L (ref 3.8–5.8)
SODIUM BLD-SCNC: 133 MMOL/L (ref 132–146)
TOTAL PROTEIN: 7.3 G/DL (ref 6.4–8.3)
WBC # BLD: 11.3 E9/L (ref 4.5–11.5)

## 2023-01-26 PROCEDURE — 87340 HEPATITIS B SURFACE AG IA: CPT

## 2023-01-26 PROCEDURE — 99213 OFFICE O/P EST LOW 20 MIN: CPT

## 2023-01-26 PROCEDURE — 82378 CARCINOEMBRYONIC ANTIGEN: CPT

## 2023-01-26 PROCEDURE — 84100 ASSAY OF PHOSPHORUS: CPT

## 2023-01-26 PROCEDURE — 83735 ASSAY OF MAGNESIUM: CPT

## 2023-01-26 PROCEDURE — 80053 COMPREHEN METABOLIC PANEL: CPT

## 2023-01-26 PROCEDURE — 36415 COLL VENOUS BLD VENIPUNCTURE: CPT

## 2023-01-26 PROCEDURE — 86706 HEP B SURFACE ANTIBODY: CPT

## 2023-01-26 PROCEDURE — 86704 HEP B CORE ANTIBODY TOTAL: CPT

## 2023-01-26 PROCEDURE — 85025 COMPLETE CBC W/AUTO DIFF WBC: CPT

## 2023-01-26 RX ORDER — DEXAMETHASONE SODIUM PHOSPHATE 10 MG/ML
10 INJECTION, SOLUTION INTRAMUSCULAR; INTRAVENOUS ONCE
OUTPATIENT
Start: 2023-01-30 | End: 2023-01-30

## 2023-01-26 RX ORDER — ONDANSETRON 4 MG/1
8 TABLET, FILM COATED ORAL 3 TIMES DAILY PRN
Qty: 30 TABLET | Refills: 2 | Status: SHIPPED | OUTPATIENT
Start: 2023-01-26 | End: 2023-02-25

## 2023-01-26 NOTE — TELEPHONE ENCOUNTER
Referral for Hearing Evaluation received. Called patient and left voicemail for the patient to call back for scheduling.     Electronically signed by Ekta Dockery on 1/26/23 at 2:43 PM EST
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
## 2023-01-26 NOTE — TELEPHONE ENCOUNTER
FILIPE spoke with pt's spouse, Michaelle Don, re: financial aid application. Marielle reported that she was able to get the information needed to apply for financial aid and was looking to bring this paperwork in the following week. FILIPE spoke with financial navigator, [employer], to get a date for pt's bill to narrow down what bank statements would be needed to apply. Marielle reported that she would bring all of the requested information into the office the following week.         Faby Cohning [facility], ALESHIA-S  Oncology Social Worker

## 2023-01-26 NOTE — PROGRESS NOTES
Spoke with patient about treatment medications (etoposide/cisplatin). We went over the protocol to be used, what to expect as far as side effects, and when to call with problems. This was intended to reinforce the education done by Dr. Barbara Donis. Prescriptions were sent to patient's local pharmacy for prochlorperazine. Patient was provided medication handout to take home and patient was told to call if there are any questions once they had a chance to absorb the information. Patient had the opportunity to ask questions with all questions being answered to their satisfaction. The patient expressed understanding and acceptance of instructions.     Elizabeth Live [place] 1/26/2023 2:51 PM

## 2023-01-26 NOTE — TELEPHONE ENCOUNTER
Dr. Monica Oleary updated patient will need an audiology referral since starting Cisplatin treatments. He asked it be done before 01/30/2023 if possible since the treatments start that day, if not it can be done the following day. Informed him the audiology department will be updated.  Mera Starr  RN, ADN, BSE, OCN  Patient Nurse Navigator

## 2023-01-27 ENCOUNTER — HOSPITAL ENCOUNTER (OUTPATIENT)
Dept: RADIATION ONCOLOGY | Age: 68
Discharge: HOME | End: 2023-01-27
Payer: OTHER GOVERNMENT

## 2023-01-27 ENCOUNTER — TELEPHONE (OUTPATIENT)
Dept: ONCOLOGY | Age: 68
End: 2023-01-27

## 2023-01-27 VITALS
OXYGEN SATURATION: 98 % | WEIGHT: 246.8 LBS | BODY MASS INDEX: 32.56 KG/M2 | RESPIRATION RATE: 18 BRPM | DIASTOLIC BLOOD PRESSURE: 90 MMHG | HEART RATE: 84 BPM | TEMPERATURE: 98.2 F | SYSTOLIC BLOOD PRESSURE: 141 MMHG

## 2023-01-27 LAB
HBV SURFACE AB TITR SER: NORMAL {TITER}
HEPATITIS B CORE TOTAL ANTIBODY: NONREACTIVE
HEPATITIS B SURFACE ANTIGEN INTERPRETATION: NORMAL

## 2023-01-27 PROCEDURE — 99205 OFFICE O/P NEW HI 60 MIN: CPT

## 2023-01-27 NOTE — TELEPHONE ENCOUNTER
SW briefly met with pt after his radiation oncology consultation with Dr. Dinesh Winston. Pt reported that he was going to get the bank statements he would need after he left today and that he would be able to bring everything back in when he returns on Monday 1/30/23. Pt was encouraged to reach out should any other needs arise.           Author Lucio MIRAMONTES, ALESHIA-S  Oncology Social Worker

## 2023-01-27 NOTE — CONSULTS
Radiation Oncology Consult Note         1/27/2023    Sirisha Singh  74 y.o.   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 HISTORY OF PRESENT ILLNESS: Mr. Sirisha Singh  is a 74y.o. year old male who underwent initial work-up in August 2022 after developing chest pain, pneumonia and upper lobe collapse. At that time he had underwent a bronchoscopy which was notable for the following:    Diagnosis:   Lung, left upper lobe, transbronchial biopsy: Minute focus of poorly   differentiated carcinoma involving bronchial connective tissue, see   comment. He subsequently was lost to follow-up until recently. He underwent restaging studies including a CT scan of the chest followed by PET scan:    CT Chest:  Complete collapse identified of the left upper lobe with findings concerning for masslike lesion with cut off sign of the left upper lobe bronchus concerning for an underlying central lesion. Bronchoscopy is recommended for further evaluation. Stable adenopathy in the left superior mediastinal region. Stable enlargement of the ascending thoracic aorta. Coronary artery calcification present. PET   Impression 1. Metabolically active mass in the left upper lobe perihilar region corresponding to the masslike density described on the recent CT scan, consistent with history of lung cancer. 2.  Mild activity in a left prevascular lymph node and the subcarinal lymph node could be reactive. Early metastatic disease should also be considered; however, given the significant difference between the activity of the lymph nodes and the primary mass, reactive change is favors. 3.  No other metabolically active metastatic disease.      The patient recently underwent repeat bronchoscopy with EUS with the findings of a large necrotic mass within the left both upper lobe bronchus. Underwent biopsy of a station 11 L lymph node which was notable for the following:    DIAGNOSIS   POSITIVE FOR MALIGNANT CELLS     Abundant malignant-appearing neoplastic cells, morphologically consistent   with a small cell carcinoma   Cellblock is paucicellular. MRI of the brain was unremarkable. The patient has been evaluated by Dr. Boom Bernabe and is expected to begin first cycle of chemotherapy with cisplatin plus -16. Patient is now referred for consideration of concurrent thoracic radiotherapy. Currently the patient states he is actually feeling relatively well. He has really no shortness of breath or cough or hemoptysis. Denies chest pain or weight loss. No bone pain or neurologic symptoms. He stopped smoking 1 month ago. PAST MEDICAL HISTORY:      Diagnosis Date    Aneurysm of abdominal aorta     Anxiety     Cancer ([address] 07/2022    skin-right eye lid, right temple, back of left ear.  09/07/2022: lung    Chronic back pain     COPD (chronic obstructive pulmonary disease) (HCC)     GERD (gastroesophageal reflux disease)     Hearing loss     Hepatitis C     vaccinated    Hyperlipidemia     Hypertension     Intracranial hemorrhage (HCC)     Osteoarthritis     Pneumonia        PAST SURGICAL HISTORY:      Procedure Laterality Date    ABDOMINAL AORTIC ANEURYSM REPAIR, ENDOVASCULAR Bilateral 11/10/2022    ENDOVASCULAR REPAIR ABDOMINAL AORTIC ANEURYSM WITH STENT GRAFT performed by Sarah Toussaint MD at [address] N/A 08/29/2022    BRONCHOSCOPY BIOPSY BRONCHUS performed by Carolyn Irizarry MD at [address] [facility] N/A 1/17/2023    BRONCHOSCOPY W/EBUS FNA, CYTOLOGY, PATHOLOGY performed by Chantelle Azevedo MD at [address] [facility] [address] Right     inguinal    UPPER GASTROINTESTINAL ENDOSCOPY      VASECTOMY         No Known Allergies    MEDICATIONS:  Medications reviewed and reconciled. Current Outpatient Medications   Medication Sig Dispense Refill    ondansetron (ZOFRAN) 4 MG tablet Take 2 tablets by mouth 3 times daily as needed for Nausea or Vomiting 30 tablet 2    prochlorperazine (COMPAZINE) 10 MG tablet Take 1 tablet by mouth every 6 hours as needed (for nausea/vomiting) 40 tablet 3    tiotropium (SPIRIVA RESPIMAT) 2.5 MCG/ACT AERS inhaler Inhale 2 puffs into the lungs daily      varenicline (CHANTIX) 0.5 MG tablet Take 0.5 mg by mouth daily Follow dosing quidelines      varenicline (CHANTIX) 1 MG tablet Take 1 mg by mouth in the morning and at bedtime After completing first dosing cycle. aspirin 81 MG EC tablet Take 1 tablet by mouth daily 30 tablet 3    lisinopril (PRINIVIL;ZESTRIL) 10 MG tablet Take 10 mg by mouth daily      Misc Natural Products (URINOZINC PO) Take by mouth daily      pantoprazole (PROTONIX) 40 MG tablet Take 40 mg by mouth daily      clopidogrel (PLAVIX) 75 MG tablet Take 1 tablet by mouth daily 30 tablet 0    albuterol sulfate HFA (VENTOLIN HFA) 108 (90 Base) MCG/ACT inhaler Inhale 2 puffs into the lungs 4 times daily as needed for Wheezing 18 g 5    atorvastatin (LIPITOR) 40 MG tablet Take 1 tablet by mouth daily 30 tablet 3    amLODIPine (NORVASC) 10 MG tablet Take 10 mg by mouth daily       No current facility-administered medications for this encounter.        FAMILY HISTORY:  Family History   Problem Relation Age of Onset    No Known Problems Mother     No Known Problems Father     Cancer Sister 62        lung    No Known Problems Sister     No Known Problems Brother     Lung Disease Brother     No Known Problems Maternal Aunt     No Known Problems Paternal Uncle     Cancer Maternal Grandmother         skin    No Known Problems Maternal Grandfather     No Known Problems Paternal Grandmother     No Known Problems Paternal Grandfather     No Known Problems Maternal Cousin     No Known Problems Paternal Cousin     Osteoarthritis Daughter     Lupus Daughter     No Known Problems Daughter     No Known Problems Daughter     No Known Problems Grandchild        SOCIAL HISTORY:       reports that he quit smoking about 2 weeks ago. His smoking use included cigarettes. He started smoking about 52 years ago. He has a 25.50 pack-year smoking history. He has been exposed to tobacco smoke. He has never used smokeless tobacco..   reports current alcohol use of about 3.0 standard drinks per week. .   reports no history of drug use. REVIEW OF SYSTEMS:   Obtained from the patient, chart review and nursing assessment. General   Psychological   Ophthalmic  ENT   Allergy and Immunology   Hematological and Lymphatic   Endocrine   Breast   Respiratory   Cardiovascular  Gastrointestinal   Genito-Urinary   Musculoskeletal   Neurological   Dermatological    Review of other systems is unremarkable otherwise. PHYSICAL EXAMINATION:      Vitals:    01/27/23 0809   BP: (!) 141/90   Pulse: 84   Resp: 18   Temp: 98.2 °F (36.8 °C)   TempSrc: Temporal   SpO2: 98%   Weight: 246 lb 12.8 oz (111.9 kg)       Wt Readings from Last 3 Encounters:   01/27/23 246 lb 12.8 oz (111.9 kg)   01/26/23 244 lb 4.8 oz (110.8 kg)   01/17/23 241 lb (109.3 kg)       KARNOSKY PERFORMANCE STATUS:      Constitutional: A well developed, well nourished 74 y.o. male who is alert, oriented, cooperative and in no apparent distress. EENT: EOMI AZALIA. No icterus. No conjunctival injections. Neck: Supple without thyromegaly. Trachea was midline. No carotid bruits. No stridor or subglottic wheeze. Respiratory: Chest was symmetrical without dullness to percussion. Breath sounds bilaterally were clear to auscultation. No wheezes, rhonchi or rales. No intercostal retraction or use of accessory muscles. No egophony noted. Breasts: Deferred    Cardiovascular: Nonpalpable PMI. Regular without murmur. Abdomen: Obese, rounded and soft without organomegaly.  No rebound, guarding or  rigidity. Lymphatic: No lymphadenopathy. Musculoskeletal: Ambulates without assistance. Normal curvature of the spine. No gross muscle weakness. Muscle size, tone and strength are normal. No involuntary movements. Extremities:  No lower extremity edema, ulcerations, tenderness, varicosities or erythema. Coordination appears adequate. Sensory function appears intact. RADIATION SAFETY AND ONCOLOGIC TREATMENT SUPPORT:    - Previous radiation history:  No  - History of autoimmune or connective tissue disease:  No  - Nutritional support/ PEG:  Not applicable  - Dental evaluation:  Not applicable  -  requested:  Not asked for.  - Oncology Nurse navigator requested:  - Transportation for daily treatment:  Self    TUMOR MARKERS:   Lab Results   Component Value Date/Time    CEA 3.1 01/26/2023 01:11 PM       IMAGING REVIEWED: See HPI  No results found. 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 NCCN guidelines were utilized in decision making. A course of concurrent chemoradiotherapy was recommended. At length we discussed mechanics and rationale of radiation therapy at length including expected side effects, potential complications and anticipated therapeutic outcomes. All of his questions were answered to his satisfaction and he wishes to proceed. He is scheduled to begin cycle 1 chemotherapy next week and we will start the process of simulation and treatment planning in anticipation of beginning radiation concurrently with cycle 2. Anticipate delivering 77 Anglin over 6 and half weeks in 33 fractions using image guided IMRT and anticipated restimulation with adaptive planning throughout therapy as necessary.     We also briefly discussed the use of prophylactic cranial irradiation to prevent recurrent brain metastasis in the event that he has a good response to initial therapy. This will be discussed further down the road. NCCN guidelines, version 2020 is used to help review treatment recommendations. Procedures and process involved with CT simulation and daily radiation treatments were explained. Toxicities, both expected and less common, were reviewed in detail. Questions were answered to their apparent satisfaction. Thank you for the opportunity to participate in multidisciplinary management of this remarkable and pleasant patient. More than 51% of a 80 visit was spent face-to-face with the patient regarding their diagnosis, treatment options and coordination of care.     Daniel Ni M.D>    Department of Radiation Oncology        [facility] ([address] [facility]) [facility]: [phone] ([facility]: [phone])  [facility]: [phone] ([facility]: [phone])  [facility]:  [phone] ([facility]:  [phone])

## 2023-01-27 NOTE — PROGRESS NOTES
Nathan Galicia  1955 74 y.o. Referring Physician: Wade Tristan    PCP: Shaylee Staley MD     Vitals:    01/27/23 0809   BP: (!) 141/90   Pulse: 84   Resp: 18   Temp: 98.2 °F (36.8 °C)   SpO2: 98%        Wt Readings from Last 3 Encounters:   01/27/23 246 lb 12.8 oz (111.9 kg)   01/26/23 244 lb 4.8 oz (110.8 kg)   01/17/23 241 lb (109.3 kg)        Body mass index is 32.56 kg/m². Chief Complaint: No chief complaint on file. Cancer Staging  No matching staging information was found for the patient. Prior Radiation Therapy? NO    Concurrent Chemo/radiation? NO    Prior Chemotherapy? NO    Prior Hormonal Therapy? NO    Head and Neck Cancer? No, patient does NOT have HN cancer. Current Outpatient Medications   Medication Sig Dispense Refill    ondansetron (ZOFRAN) 4 MG tablet Take 2 tablets by mouth 3 times daily as needed for Nausea or Vomiting 30 tablet 2    prochlorperazine (COMPAZINE) 10 MG tablet Take 1 tablet by mouth every 6 hours as needed (for nausea/vomiting) 40 tablet 3    tiotropium (SPIRIVA RESPIMAT) 2.5 MCG/ACT AERS inhaler Inhale 2 puffs into the lungs daily      varenicline (CHANTIX) 0.5 MG tablet Take 0.5 mg by mouth daily Follow dosing quidelines      varenicline (CHANTIX) 1 MG tablet Take 1 mg by mouth in the morning and at bedtime After completing first dosing cycle.       aspirin 81 MG EC tablet Take 1 tablet by mouth daily 30 tablet 3    lisinopril (PRINIVIL;ZESTRIL) 10 MG tablet Take 10 mg by mouth daily      Misc Natural Products (URINOZINC PO) Take by mouth daily      pantoprazole (PROTONIX) 40 MG tablet Take 40 mg by mouth daily      clopidogrel (PLAVIX) 75 MG tablet Take 1 tablet by mouth daily 30 tablet 0    albuterol sulfate HFA (VENTOLIN HFA) 108 (90 Base) MCG/ACT inhaler Inhale 2 puffs into the lungs 4 times daily as needed for Wheezing 18 g 5    atorvastatin (LIPITOR) 40 MG tablet Take 1 tablet by mouth daily 30 tablet 3    amLODIPine (NORVASC) 10 MG tablet Take 10 mg by mouth daily       No current facility-administered medications for this encounter. Past Medical History:   Diagnosis Date    Aneurysm of abdominal aorta     Anxiety     Cancer ([facility] [address] 07/2022    skin-right eye lid, right temple, back of left ear.  09/07/2022: lung    Chronic back pain     COPD (chronic obstructive pulmonary disease) (HCC)     GERD (gastroesophageal reflux disease)     Hearing loss     Hepatitis C     vaccinated    Hyperlipidemia     Hypertension     Intracranial hemorrhage ([facility] [address]     Osteoarthritis     Pneumonia        Past Surgical History:   Procedure Laterality Date    ABDOMINAL AORTIC ANEURYSM REPAIR, ENDOVASCULAR Bilateral 11/10/2022    ENDOVASCULAR REPAIR ABDOMINAL AORTIC ANEURYSM WITH STENT GRAFT performed by Jignesh Azevedo MD at [address] N/A 08/29/2022    BRONCHOSCOPY BIOPSY BRONCHUS performed by Marion Demarco MD at [address] [facility] N/A 1/17/2023    BRONCHOSCOPY W/EBUS FNA, CYTOLOGY, PATHOLOGY performed by Mary Manriquez MD at [address] Right     inguinal    UPPER GASTROINTESTINAL ENDOSCOPY      VASECTOMY         Family History   Problem Relation Age of Onset    No Known Problems Mother     No Known Problems Father     Cancer Sister 62        lung    No Known Problems Sister     No Known Problems Brother     Lung Disease Brother     No Known Problems Maternal Aunt     No Known Problems Paternal Uncle     Cancer Maternal Grandmother         skin    No Known Problems Maternal Grandfather     No Known Problems Paternal Grandmother     No Known Problems Paternal Grandfather     No Known Problems Maternal Cousin     No Known Problems Paternal Cousin     Osteoarthritis Daughter     Lupus Daughter     No Known Problems Daughter     No Known Problems Daughter     No Known Problems Grandchild        Social History     Socioeconomic History    Marital status:      Spouse name: Not on file    Number of children: Not on file    Years of education: Not on file    Highest education level: Not on file   Occupational History    Not on file   Tobacco Use    Smoking status: Former     Packs/day: 0.50     Years: 51.00     Pack years: 25.50     Types: Cigarettes     Start date:      Quit date: 2023     Years since quittin.0     Passive exposure: Current    Smokeless tobacco: Never    Tobacco comments:     Patient states he's trying to quit on his own, taking chantix now   Vaping Use    Vaping Use: Never used   Substance and Sexual Activity    Alcohol use: Yes     Alcohol/week: 3.0 standard drinks     Types: 3 Cans of beer per week    Drug use: No    Sexual activity: Yes     Partners: Female   Other Topics Concern    Not on file   Social History Narrative    ** Merged History Encounter **          Social Determinants of Health     Financial Resource Strain: Not on file   Food Insecurity: Not on file   Transportation Needs: Not on file   Physical Activity: Not on file   Stress: Not on file   Social Connections: Not on file   Intimate Partner Violence: Not on file   Housing Stability: Not on file           Occupation: Retired  Retired:  YES: Patient is retired from [employer]. REVIEW OF SYSTEMS: <<For Level 5, 10 or more systems>>   Approximately >20 minutes spent with patient today to discuss RT to the chest. Patient states that he had presented to the ED back in August with chest pain. Patient then had a CTA chest done on 22 which revealed Collapse of most of the left upper lobe radiating to the left hilum. There are mediastinal lymph nodes. Central left hilar tumor is not excluded. Patient then underwent a ILENE transbronchial biopsy done on 22 for which pathology revealed Minute focus of poorly differentiated carcinoma involving bronchial connective tissue.  Patient states he then was found to have an AAA which he had an EVAR with Stent Graft done by Dr. Maddy Warner in November. Patient had a CT of chest done ordered by Dr. Clifford Yan on 12/21/22 which revealed Complete collapse identified of the left upper lobe with findings concerning for masslike lesion with cut off sign of the left upper lobe bronchus concerning for an underlying central lesion. Stable adenopathy in the left superior mediastinal region. Patient then had an MRI of the Brain done which showed No intracranial metastatic disease. On 01/07/23 patient had a PET scan done which showed: Impression   1. Metabolically active mass in the left upper lobe perihilar region   corresponding to the masslike density described on the recent CT scan,   consistent with history of lung cancer. 2.  Mild activity in a left prevascular lymph node and the subcarinal lymph   node could be reactive. Early metastatic disease should also be considered;   however, given the significant difference between the activity of the lymph   nodes and the primary mass, reactive change is favors. 3.  No other metabolically active metastatic disease. The patient then underwent a Bronch/EBUS with FNA of 11L on 01/17/23 for which pathology was positive for malignant cells, abundant malignant-appearing neoplastic cells, morphologically consistent with a small cell carcinoma. Patient is following with Dr. Tucker Irby for Med Onc, last seen on 01/26/23 who refers patient here today and who recommends treatment with chemoradiation with concurrent cisplatin and etoposide for 4 cycles and states that patient is not a surgical candidate considering N1 status. Education given on RT to the chest utilizing handouts and slides. Patient verbalized understanding of care and all questions were answered from a nursing perspective. Consent for RT to the chest signed and scanned into [employer]. CT [facility] appointment for Monday, Feb. 13th at 7:30 AM given to patient with instructions on location at [facility] Radiology dept. and instructed not necessary to pre register.         Pacemaker/Defibulator/ICD:  No    Mediport: No, but will be getting one placed        FALLS RISK SCREENING ASSESSMENT    Instructions:  Assess the patient and enter the appropriate indicators that are present for fall risk identification.   Total the numbers entered and assign a fall risk score from Table 2.  Reassess patient at a minimum every 12 weeks or with status change.    Assessment   Date  1/27/2023     1.  Mental Ability: confusion/cognitively impaired No - 0       2.  Elimination Issues: incontinence, frequency No - 0       3.  Ambulatory: use of assistive devices (walker, cane, off-loading devices), attached to equipment (IV pole, oxygen) No - 0     4.  Sensory Limitations: dizziness, vertigo, impaired vision No - 0       5.  Age 65 years or greater - 1       6.  Medication: diuretics, strong analgesics, hypnotics, sedatives, antihypertensive agents   Yes - 3   7.  Falls:  recent history of falls within the last 3 months (not to include slipping or tripping)   No - 0   TOTAL 4    If score of 4 or greater was education given? Yes       TABLE 2   Risk Score Risk Level Plan of Care   0-3 Little or  No Risk 1.  Provide assistance as indicated for ambulation activities  2.  Reorient confused/cognitively impaired patient  3.  Call-light/bell within patient's reach  4.  Chair/bed in low position, stretcher/bed with siderails up except when performing patient care activities  5.  Educate patient/family/caregiver on falls prevention  6.  Reassess in 12 weeks or with any noted change in patient condition which places them at a risk for a fall   4-6 Moderate Risk 1.  Provide assistance as indicated for ambulation activities  2.  Reorient confused/cognitively impaired patient  3.  Call-light/bell within patient's reach  4.  Chair/bed in low position, stretcher/bed with siderails up except when performing patient care activities  5.  Educate patient/family/caregiver on falls prevention  6.  Falls risk  precaution (Yellow sticker Level II) placed on patient chart   7 or   Higher High Risk 1. Place patient in easily observable treatment room  2. Patient attended at all times by family member or staff  3. Provide assistance as indicated for ambulation activities  4. Reorient confused/cognitively impaired patient  5. Call-light/bell within patient's reach  6. Chair/bed in low position, stretcher/bed with siderails up except when performing patient care activities  7. Educate patient/family/caregiver on falls prevention  8. Falls risk precaution (Yellow sticker Level III) placed on patient chart           MALNUTRITION RISK SCREENING ASSESSMENT    Instructions:  Assess the patient and enter the appropriate indicators that are present for nutrition risk identification. Total the numbers entered and assign a risk score. Follow the appropriate action for total score listed below. Assessment   Date  1/27/2023     Have you lost weight without trying? 0- No     Have you been eating poorly because of a decreased appetite? 0- No   3. Do you have a diagnosis of head and neck cancer? 0- No                                                                                    TOTAL 0          Score of 0-1: No action  Score 2 or greater:   For Non-Diabetic Patient: Recommend adding Ensure Complete 2 x daily and provide patient with Ensure wellness bag with coupons  For Diabetic Patient: Recommend adding Glucerna Shake 2 x daily and provide patient with Glucerna Wellness bag with coupons  Route to the dietitian via [id number]    Are you having difficulty performing daily routine tasks due to fatigue or weakness (ie: bathing/showering, dressing, housework, meal prep, work, , etc): No     Do you have any arm flexibility/ROM restrictions, swelling or pain that limit activity: No     Any changes in memory, attention/focus that impact daily activities: No     Do you avoid participation in leisure/social activity due to weakness, fatigue or pain: No     ARE ANY OF THE ABOVE ARE ANSWERED YES: No          PT ASSESSMENT FOR REFERRAL    Have you had any recent falls in the past 2 months: No     Do you have difficulty going up/down stairs: No     Are you having difficulty walking: No     Do you often hold onto furniture/environmental supports or feel off balance when you are walking: No     Do you need to take rest breaks when you are walking: No     Any pain on a scale of 1-10 that limits your mobility: No 0/10    ARE ANY OF THE ABOVE ARE ANSWERED YES: No                   PELVIC FLOOR DYSFUNCTION SCREENING ASSESSMENT    - Do you have any pelvic pain? No     - Do you have any fecal constipation or fecal incontinence? No     - Do you have any urinary incontinence or difficulty starting to urinate? No     ARE ANY OF THE ABOVE ARE ANSWERED YES: No          PREHAB AUDIOLOGY REFERRAL    - Is patient planned to receive Cisplatin? Yes. Audiology referral request sent to Dr. Sunny Leon MD.    - Is patient planned to receive radiation therapy that may be directed toward auditory canals or nerves? No. Patient is not planned to start radiation therapy to auditory canals or nerves. - Is patient complaining of new onset hearing loss? No. Patient is not complaining of new onset hearing loss. Patient education given on RT to the chest. The patient expresses understanding and acceptance of instructions.  Pillo Salmon RN 1/27/2023 8:12 AM           Pillo Salmon RN

## 2023-01-30 ENCOUNTER — HOSPITAL ENCOUNTER (OUTPATIENT)
Dept: INFUSION THERAPY | Age: 68
Discharge: HOME | End: 2023-01-30
Payer: MEDICARE

## 2023-01-30 ENCOUNTER — TELEPHONE (OUTPATIENT)
Dept: SURGERY | Age: 68
End: 2023-01-30

## 2023-01-30 VITALS
OXYGEN SATURATION: 95 % | TEMPERATURE: 98.8 F | RESPIRATION RATE: 16 BRPM | HEART RATE: 78 BPM | DIASTOLIC BLOOD PRESSURE: 76 MMHG | SYSTOLIC BLOOD PRESSURE: 144 MMHG

## 2023-01-30 DIAGNOSIS — C80.1 MALIGNANT SMALL CELL CANCER (HCC): Primary | ICD-10-CM

## 2023-01-30 DIAGNOSIS — C34.12 MALIGNANT NEOPLASM OF UPPER LOBE OF LEFT LUNG (HCC): ICD-10-CM

## 2023-01-30 PROBLEM — I87.8 POOR VENOUS ACCESS: Status: ACTIVE | Noted: 2023-01-30

## 2023-01-30 PROCEDURE — 96417 CHEMO IV INFUS EACH ADDL SEQ: CPT

## 2023-01-30 PROCEDURE — 96413 CHEMO IV INFUSION 1 HR: CPT

## 2023-01-30 PROCEDURE — 2580000003 HC RX 258: Performed by: INTERNAL MEDICINE

## 2023-01-30 PROCEDURE — 6360000002 HC RX W HCPCS: Performed by: INTERNAL MEDICINE

## 2023-01-30 PROCEDURE — A4216 STERILE WATER/SALINE, 10 ML: HCPCS | Performed by: INTERNAL MEDICINE

## 2023-01-30 PROCEDURE — 96367 TX/PROPH/DG ADDL SEQ IV INF: CPT

## 2023-01-30 PROCEDURE — 96375 TX/PRO/DX INJ NEW DRUG ADDON: CPT

## 2023-01-30 PROCEDURE — 96415 CHEMO IV INFUSION ADDL HR: CPT

## 2023-01-30 RX ORDER — DEXAMETHASONE SODIUM PHOSPHATE 10 MG/ML
10 INJECTION INTRAMUSCULAR; INTRAVENOUS ONCE
Status: COMPLETED | OUTPATIENT
Start: 2023-01-30 | End: 2023-01-30

## 2023-01-30 RX ORDER — PALONOSETRON HYDROCHLORIDE 0.05 MG/ML
0.25 INJECTION, SOLUTION INTRAVENOUS ONCE
Status: COMPLETED | OUTPATIENT
Start: 2023-01-30 | End: 2023-01-30

## 2023-01-30 RX ORDER — SODIUM CHLORIDE 9 MG/ML
5-40 INJECTION INTRAVENOUS PRN
Status: DISCONTINUED | OUTPATIENT
Start: 2023-01-30 | End: 2023-01-31 | Stop reason: HOSPADM

## 2023-01-30 RX ORDER — SODIUM CHLORIDE 9 MG/ML
5-250 INJECTION, SOLUTION INTRAVENOUS PRN
Status: DISCONTINUED | OUTPATIENT
Start: 2023-01-30 | End: 2023-01-31 | Stop reason: HOSPADM

## 2023-01-30 RX ADMIN — SODIUM CHLORIDE, PRESERVATIVE FREE 10 ML: 5 INJECTION INTRAVENOUS at 14:20

## 2023-01-30 RX ADMIN — FOSAPREPITANT 150 MG: 150 INJECTION, POWDER, LYOPHILIZED, FOR SOLUTION INTRAVENOUS at 09:56

## 2023-01-30 RX ADMIN — PALONOSETRON 0.25 MG: 0.25 INJECTION, SOLUTION INTRAVENOUS at 09:39

## 2023-01-30 RX ADMIN — DEXAMETHASONE SODIUM PHOSPHATE 10 MG: 10 INJECTION INTRAMUSCULAR; INTRAVENOUS at 09:43

## 2023-01-30 RX ADMIN — ETOPOSIDE 240 MG: 20 INJECTION INTRAVENOUS at 12:58

## 2023-01-30 RX ADMIN — SODIUM CHLORIDE 20 ML/HR: 9 INJECTION, SOLUTION INTRAVENOUS at 09:27

## 2023-01-30 RX ADMIN — SODIUM CHLORIDE, PRESERVATIVE FREE 10 ML: 5 INJECTION INTRAVENOUS at 09:27

## 2023-01-30 RX ADMIN — POTASSIUM CHLORIDE: 2 INJECTION, SOLUTION, CONCENTRATE INTRAVENOUS at 10:44

## 2023-01-30 NOTE — PROGRESS NOTES
Tolerated infusion well. Vitals stable-Reviewed meds and side effects, chemo teaching done. Patient preferred to leave current  IV access in for tomorrow's infusion. Teaching done . Dressing changed and applied cotton netting to secure.  Discharged-ambulatory

## 2023-01-30 NOTE — TELEPHONE ENCOUNTER
Referral received from Dr. Prachi Herbert for mediport insertion. Patient has poor venous access and is getting chemotherapy for lung cancer. Labs and medications reviewed. Per Dr. Aisha Hung, patient is scheduled for Mediport Insertion at [address] [place] [address] on 23. Surgery scheduled via [facility], surgeon's calendar updated. Dr. Aisha Hung to enter orders. Follow up appointment scheduled. Patient requested Monday or Thursday morning surgery. Instructed to hold plavix 5 days prior to surgery. Electronically signed by Ayush Tran RN on 2023 at 2:32 PM    Prior Authorization Form:      DEMOGRAPHICS:                     Patient Name:  Daja Warner  Patient :  1955            Insurance:  Payor: Olga Michel / Plan: Alena Butts ESSENTIAL/PLUS / Product Type: *No Product type* /   Insurance ID Number:    Payer/Plan Subscr  Sex Relation Sub. Ins. ID Effective Group Num   1. BCBS MEDICAREJejohnbarrett Jackson 1955 Male Self [id number]                                   [address]   2.  VACCN Rk Fernandez 1955 Male Self [id number] 10/20/04                                    [address]         DIAGNOSIS & PROCEDURE:                       Procedure/Operation: Mediport insertion           CPT Code: 40354    Diagnosis:  poor venous access    ICD10 Code: I87.8    Location:  [address] [place] [address]    Surgeon:  Chris Astudillo INFORMATION:                          Date: 23    Time: TBD              Anesthesia:  MAC/TIVA                                                       Status:  Outpatient        Special Comments:         Electronically signed by Ayush Tran RN on 2023 at 2:32 PM

## 2023-01-31 ENCOUNTER — HOSPITAL ENCOUNTER (OUTPATIENT)
Dept: INFUSION THERAPY | Age: 68
Discharge: HOME | End: 2023-01-31
Payer: MEDICARE

## 2023-01-31 ENCOUNTER — PROCEDURE VISIT (OUTPATIENT)
Dept: AUDIOLOGY | Age: 68
End: 2023-01-31
Payer: MEDICARE

## 2023-01-31 ENCOUNTER — PREP FOR PROCEDURE (OUTPATIENT)
Dept: SURGERY | Age: 68
End: 2023-01-31

## 2023-01-31 VITALS
SYSTOLIC BLOOD PRESSURE: 169 MMHG | HEART RATE: 80 BPM | DIASTOLIC BLOOD PRESSURE: 78 MMHG | OXYGEN SATURATION: 95 % | TEMPERATURE: 98.8 F | RESPIRATION RATE: 16 BRPM

## 2023-01-31 DIAGNOSIS — C80.1 MALIGNANT SMALL CELL CANCER (HCC): Primary | ICD-10-CM

## 2023-01-31 DIAGNOSIS — C34.92 CARCINOMA OF LEFT LUNG (HCC): Primary | ICD-10-CM

## 2023-01-31 PROCEDURE — 2580000003 HC RX 258: Performed by: NURSE PRACTITIONER

## 2023-01-31 PROCEDURE — 6360000002 HC RX W HCPCS

## 2023-01-31 PROCEDURE — 92567 TYMPANOMETRY: CPT | Performed by: AUDIOLOGIST

## 2023-01-31 PROCEDURE — 92588 EVOKED AUDITORY TST COMPLETE: CPT | Performed by: AUDIOLOGIST

## 2023-01-31 PROCEDURE — 92557 COMPREHENSIVE HEARING TEST: CPT | Performed by: AUDIOLOGIST

## 2023-01-31 PROCEDURE — 96375 TX/PRO/DX INJ NEW DRUG ADDON: CPT

## 2023-01-31 PROCEDURE — 2580000003 HC RX 258: Performed by: INTERNAL MEDICINE

## 2023-01-31 PROCEDURE — 96413 CHEMO IV INFUSION 1 HR: CPT

## 2023-01-31 PROCEDURE — 6360000002 HC RX W HCPCS: Performed by: NURSE PRACTITIONER

## 2023-01-31 RX ORDER — DEXAMETHASONE SODIUM PHOSPHATE 10 MG/ML
INJECTION INTRAMUSCULAR; INTRAVENOUS
Status: COMPLETED
Start: 2023-01-31 | End: 2023-01-31

## 2023-01-31 RX ORDER — SODIUM CHLORIDE 9 MG/ML
5-40 INJECTION INTRAVENOUS PRN
Status: DISCONTINUED | OUTPATIENT
Start: 2023-01-31 | End: 2023-02-01 | Stop reason: HOSPADM

## 2023-01-31 RX ORDER — SODIUM CHLORIDE 0.9 % (FLUSH) 0.9 %
5-40 SYRINGE (ML) INJECTION EVERY 12 HOURS SCHEDULED
Status: CANCELLED | OUTPATIENT
Start: 2023-01-31

## 2023-01-31 RX ORDER — SODIUM CHLORIDE 9 MG/ML
INJECTION, SOLUTION INTRAVENOUS PRN
Status: CANCELLED | OUTPATIENT
Start: 2023-01-31

## 2023-01-31 RX ORDER — SODIUM CHLORIDE 9 MG/ML
5-250 INJECTION, SOLUTION INTRAVENOUS PRN
Status: DISCONTINUED | OUTPATIENT
Start: 2023-01-31 | End: 2023-02-01 | Stop reason: HOSPADM

## 2023-01-31 RX ORDER — DEXAMETHASONE SODIUM PHOSPHATE 10 MG/ML
8 INJECTION INTRAMUSCULAR; INTRAVENOUS ONCE
Status: COMPLETED | OUTPATIENT
Start: 2023-01-31 | End: 2023-01-31

## 2023-01-31 RX ORDER — SODIUM CHLORIDE, SODIUM LACTATE, POTASSIUM CHLORIDE, CALCIUM CHLORIDE 600; 310; 30; 20 MG/100ML; MG/100ML; MG/100ML; MG/100ML
INJECTION, SOLUTION INTRAVENOUS CONTINUOUS
Status: CANCELLED | OUTPATIENT
Start: 2023-01-31

## 2023-01-31 RX ORDER — SODIUM CHLORIDE 0.9 % (FLUSH) 0.9 %
5-40 SYRINGE (ML) INJECTION PRN
Status: CANCELLED | OUTPATIENT
Start: 2023-01-31

## 2023-01-31 RX ADMIN — DEXAMETHASONE SODIUM PHOSPHATE 8 MG: 10 INJECTION INTRAMUSCULAR; INTRAVENOUS at 13:36

## 2023-01-31 RX ADMIN — SODIUM CHLORIDE, PRESERVATIVE FREE 10 ML: 5 INJECTION INTRAVENOUS at 13:30

## 2023-01-31 RX ADMIN — SODIUM CHLORIDE 20 ML/HR: 9 INJECTION, SOLUTION INTRAVENOUS at 13:30

## 2023-01-31 RX ADMIN — ETOPOSIDE 240 MG: 20 INJECTION INTRAVENOUS at 14:04

## 2023-01-31 NOTE — PROGRESS NOTES
This patient was referred for audiometric, tympanometric, and distortion product otoacoustic emissions (DPOAE) testing by  MERCEDEZ Mercado  due to  use of ototoxic medications . Patient started chemotherapy 01/30/2023. Patient reported having some difficulty hearing. Patient denied any tinnitus, ear pain, dizziness, pressure, or drainage. Audiometry using pure tone air and bone conduction testing revealed a mild sloping to profound sensorineural hearing loss, in the right ear, and hearing sensitivity within normal limits through 1000 Hz, sloping to profound sensorineural hearing loss, in the left ear. High frequency audiometry was completed and results revealed a profound loss 8k-11.2k, with no responses 12.5k-20k, in the right ear, and a profound loss 8k-12.5k, with no responses 14k-20k, in the left ear. Reliability was good. Speech reception thresholds were in good agreement with the pure tone averages, bilaterally. Speech discrimination scores were excellent, bilaterally. Distortion product otoacoustic emissions (DPOAE) testing was conducted bilaterally and revealed present cochlear responses at 5596-2563 Hz and 6000 Hz, in the right ear and present cochlear responses at 9807-3864 Hz, in the left ear. Tympanometry revealed normal middle ear peak pressure and compliance, bilaterally. Discussed with patient the need for follow up if he notices any decrease in hearing sensitivity, onset of tinnitus, or dizziness after beginning treatment. The results were reviewed with the patient. Recommendations for follow up will be made pending physician consult. BENJAMIN Blanco.   Audiology Intern (4th Year)

## 2023-01-31 NOTE — PROGRESS NOTES
I have discussed the case, including pertinent history and exam findings with the audiology extern. I have seen and examined the patient and the key elements of the encounter have been performed by me. I agree with the assessment, plan and orders as documented by the audiology extern.    Eugene Garcia CCC/A  Audiologist  [id number]  NPI#:  [id number]

## 2023-02-01 ENCOUNTER — HOSPITAL ENCOUNTER (OUTPATIENT)
Dept: INFUSION THERAPY | Age: 68
Discharge: HOME | End: 2023-02-01
Payer: MEDICARE

## 2023-02-01 VITALS
RESPIRATION RATE: 16 BRPM | SYSTOLIC BLOOD PRESSURE: 166 MMHG | OXYGEN SATURATION: 98 % | HEART RATE: 82 BPM | TEMPERATURE: 98.9 F | DIASTOLIC BLOOD PRESSURE: 84 MMHG

## 2023-02-01 DIAGNOSIS — C80.1 MALIGNANT SMALL CELL CANCER (HCC): Primary | ICD-10-CM

## 2023-02-01 PROCEDURE — 2580000003 HC RX 258: Performed by: INTERNAL MEDICINE

## 2023-02-01 PROCEDURE — 96413 CHEMO IV INFUSION 1 HR: CPT

## 2023-02-01 PROCEDURE — 2580000003 HC RX 258: Performed by: NURSE PRACTITIONER

## 2023-02-01 PROCEDURE — 96375 TX/PRO/DX INJ NEW DRUG ADDON: CPT

## 2023-02-01 PROCEDURE — 6360000002 HC RX W HCPCS: Performed by: INTERNAL MEDICINE

## 2023-02-01 PROCEDURE — 6360000002 HC RX W HCPCS: Performed by: NURSE PRACTITIONER

## 2023-02-01 RX ORDER — DEXAMETHASONE SODIUM PHOSPHATE 10 MG/ML
8 INJECTION INTRAMUSCULAR; INTRAVENOUS ONCE
Status: COMPLETED | OUTPATIENT
Start: 2023-02-01 | End: 2023-02-01

## 2023-02-01 RX ORDER — SODIUM CHLORIDE 9 MG/ML
5-250 INJECTION, SOLUTION INTRAVENOUS PRN
Status: DISCONTINUED | OUTPATIENT
Start: 2023-02-01 | End: 2023-02-02 | Stop reason: HOSPADM

## 2023-02-01 RX ADMIN — ETOPOSIDE 240 MG: 20 INJECTION INTRAVENOUS at 13:30

## 2023-02-01 RX ADMIN — DEXAMETHASONE SODIUM PHOSPHATE 8 MG: 10 INJECTION INTRAMUSCULAR; INTRAVENOUS at 13:07

## 2023-02-01 RX ADMIN — SODIUM CHLORIDE 250 ML/HR: 9 INJECTION, SOLUTION INTRAVENOUS at 13:04

## 2023-02-02 ENCOUNTER — APPOINTMENT (OUTPATIENT)
Dept: INFUSION THERAPY | Age: 68
End: 2023-02-02
Payer: OTHER GOVERNMENT

## 2023-02-09 ENCOUNTER — HOSPITAL ENCOUNTER (OUTPATIENT)
Dept: INFUSION THERAPY | Age: 68
Discharge: HOME | End: 2023-02-09
Payer: OTHER GOVERNMENT

## 2023-02-09 ENCOUNTER — TELEPHONE (OUTPATIENT)
Dept: ONCOLOGY | Age: 68
End: 2023-02-09

## 2023-02-09 ENCOUNTER — OFFICE VISIT (OUTPATIENT)
Dept: ONCOLOGY | Age: 68
End: 2023-02-09
Payer: MEDICARE

## 2023-02-09 VITALS
TEMPERATURE: 98.9 F | OXYGEN SATURATION: 97 % | BODY MASS INDEX: 31.77 KG/M2 | HEART RATE: 86 BPM | HEIGHT: 73 IN | SYSTOLIC BLOOD PRESSURE: 150 MMHG | WEIGHT: 239.7 LBS | DIASTOLIC BLOOD PRESSURE: 78 MMHG

## 2023-02-09 DIAGNOSIS — C34.12 SMALL CELL LUNG CANCER, LEFT UPPER LOBE (HCC): Primary | ICD-10-CM

## 2023-02-09 DIAGNOSIS — C34.12 MALIGNANT NEOPLASM OF UPPER LOBE OF LEFT LUNG (HCC): ICD-10-CM

## 2023-02-09 LAB
ALBUMIN SERPL-MCNC: 3 G/DL (ref 3.5–5.2)
ALP BLD-CCNC: 65 U/L (ref 40–129)
ALT SERPL-CCNC: 62 U/L (ref 0–40)
ANION GAP SERPL CALCULATED.3IONS-SCNC: 10 MMOL/L (ref 7–16)
AST SERPL-CCNC: 76 U/L (ref 0–39)
BASOPHILS ABSOLUTE: 0.01 E9/L (ref 0–0.2)
BASOPHILS RELATIVE PERCENT: 0.4 % (ref 0–2)
BILIRUB SERPL-MCNC: 0.3 MG/DL (ref 0–1.2)
BUN BLDV-MCNC: 11 MG/DL (ref 6–23)
CALCIUM SERPL-MCNC: 8.8 MG/DL (ref 8.6–10.2)
CHLORIDE BLD-SCNC: 97 MMOL/L (ref 98–107)
CO2: 26 MMOL/L (ref 22–29)
CREAT SERPL-MCNC: 0.8 MG/DL (ref 0.7–1.2)
EOSINOPHILS ABSOLUTE: 0.02 E9/L (ref 0.05–0.5)
EOSINOPHILS RELATIVE PERCENT: 0.8 % (ref 0–6)
GFR SERPL CREATININE-BSD FRML MDRD: >60 ML/MIN/1.73
GLUCOSE BLD-MCNC: 144 MG/DL (ref 74–99)
HCT VFR BLD CALC: 30.7 % (ref 37–54)
HEMOGLOBIN: 10.8 G/DL (ref 12.5–16.5)
IMMATURE GRANULOCYTES #: 0.01 E9/L
IMMATURE GRANULOCYTES %: 0.4 % (ref 0–5)
LYMPHOCYTES ABSOLUTE: 0.72 E9/L (ref 1.5–4)
LYMPHOCYTES RELATIVE PERCENT: 30.3 % (ref 20–42)
MCH RBC QN AUTO: 28.9 PG (ref 26–35)
MCHC RBC AUTO-ENTMCNC: 35.2 % (ref 32–34.5)
MCV RBC AUTO: 82.1 FL (ref 80–99.9)
MONOCYTES ABSOLUTE: 0.25 E9/L (ref 0.1–0.95)
MONOCYTES RELATIVE PERCENT: 10.5 % (ref 2–12)
NEUTROPHILS ABSOLUTE: 1.37 E9/L (ref 1.8–7.3)
NEUTROPHILS RELATIVE PERCENT: 57.6 % (ref 43–80)
PDW BLD-RTO: 13.5 FL (ref 11.5–15)
PLATELET # BLD: 162 E9/L (ref 130–450)
PMV BLD AUTO: 9 FL (ref 7–12)
POTASSIUM SERPL-SCNC: 3.8 MMOL/L (ref 3.5–5)
RBC # BLD: 3.74 E12/L (ref 3.8–5.8)
SODIUM BLD-SCNC: 133 MMOL/L (ref 132–146)
TOTAL PROTEIN: 6.5 G/DL (ref 6.4–8.3)
WBC # BLD: 2.4 E9/L (ref 4.5–11.5)

## 2023-02-09 PROCEDURE — 80053 COMPREHEN METABOLIC PANEL: CPT

## 2023-02-09 PROCEDURE — 99212 OFFICE O/P EST SF 10 MIN: CPT

## 2023-02-09 PROCEDURE — 85025 COMPLETE CBC W/AUTO DIFF WBC: CPT

## 2023-02-09 PROCEDURE — 36415 COLL VENOUS BLD VENIPUNCTURE: CPT

## 2023-02-09 NOTE — TELEPHONE ENCOUNTER
SW spoke with pt's wife, Marcelo Soni, re: finishing financial aid application. All paperwork was brought in and was sent to financial aid department at this time. Pt to follow up when they received a response.         Karthik MIRAMONTES, RUBY  Oncology Social Worker
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
## 2023-02-16 ENCOUNTER — HOSPITAL ENCOUNTER (OUTPATIENT)
Dept: RADIATION ONCOLOGY | Age: 68
Discharge: HOME | End: 2023-02-16
Payer: OTHER GOVERNMENT

## 2023-02-16 ENCOUNTER — TELEPHONE (OUTPATIENT)
Dept: SURGERY | Age: 68
End: 2023-02-16

## 2023-02-16 PROCEDURE — 77338 DESIGN MLC DEVICE FOR IMRT: CPT | Performed by: RADIOLOGY

## 2023-02-16 PROCEDURE — 77300 RADIATION THERAPY DOSE PLAN: CPT | Performed by: RADIOLOGY

## 2023-02-16 PROCEDURE — 77301 RADIOTHERAPY DOSE PLAN IMRT: CPT | Performed by: RADIOLOGY

## 2023-02-16 NOTE — TELEPHONE ENCOUNTER
Call placed to patient to inquire if he will be using VA insurance or [place] for his mediport. Patient is requesting to go through [employer]. Patient informed that when looking through our system it appears that everything is going through [place]. Call placed to the cancer center to inquire. They are billing treatments through [place]. Patients wife instructed to follow up with the cancer center to discuss obtaining the auth from the [place] for treatment. Patients wife verbalized understanding.   Electronically signed by Edilson Mata on 2/16/23 at 1:16 PM EST

## 2023-02-17 ENCOUNTER — TELEPHONE (OUTPATIENT)
Dept: CASE MANAGEMENT | Age: 68
End: 2023-02-17

## 2023-02-17 ENCOUNTER — TELEPHONE (OUTPATIENT)
Dept: INFUSION THERAPY | Age: 68
End: 2023-02-17

## 2023-02-17 ENCOUNTER — TELEPHONE (OUTPATIENT)
Dept: ONCOLOGY | Age: 68
End: 2023-02-17

## 2023-02-17 ENCOUNTER — ANESTHESIA EVENT (OUTPATIENT)
Dept: OPERATING ROOM | Age: 68
End: 2023-02-17
Payer: MEDICARE

## 2023-02-17 NOTE — TELEPHONE ENCOUNTER
Patient's wife called leaving a  message stating they want the VA billed for services related to oncology. She stated they contacted patient's [facility] PCP asking for an oncology referral. Informed a message was left this morning with Sivan at the [facility] informing oncology services needs the authorization as soon as possible so treatments aren't delayed. Informed the clinic  said she sent the office notes for medical and radiation oncology up to the [facility] this morning. Informed the clinic  stated they can file financial aide to help cover costs for the medi port surgery until the VA send the approval. She stated they were at the clinic recently to file with the  and would be glad to drop off any other information that's needed. Informed the clinic  will be updated, she verbalized understanding. After call received call from Sowmya at [facility] asking about th authorization. Informed her of above discussion and that once this clinic hears back from the VA they'll be updated.Orin Erickson  RN, ADN, BSE, OCN  Patient Nurse Navigator

## 2023-02-17 NOTE — PRE-PROCEDURE INSTRUCTIONS
[facility]                                                                                                                    PRE OP INSTRUCTIONS FOR  Shaka Johnson        Date: 2/17/2023    Date of surgery: 2/20/23   Arrival Time: Hospital will call you between 5pm and 7pm with your final arrival time for surgery    Nothing by mouth (NPO) as instructed. Npo 4 hrs before    Take the following medications with a small sip of water on the morning of Surgery: bring inhaler, norvasc, protonix, use spireva    Diabetics may take evening dose of insulin but none after midnight. If you feel symptomatic or low blood sugar morning of surgery drink 1-2 ounces of apple juice only. Aspirin, Ibuprofen, Advil, Naproxen, Vitamin E and other Anti-inflammatory products should be stopped  before surgery  as directed by your physician. Take Tylenol only unless instructed otherwise by your surgeon. Check with your Doctor regarding stopping Plavix, Coumadin, Lovenox, Eliquis, Effient, or other blood thinners. Do not smoke,use illicit drugs and do not drink any alcoholic beverages 24 hours prior to surgery. You may brush your teeth the morning of surgery. DO NOT SWALLOW WATER    You MUST make arrangements for a responsible adult to take you home after your surgery. You will not be allowed to leave alone or drive yourself home. It is strongly suggested someone stay with you the first 24 hrs. Your surgery will be cancelled if you do not have a ride home. PEDIATRIC PATIENTS ONLY:  A parent/legal guardian must accompany a child scheduled for surgery and plan to stay at the hospital until the child is discharged. Please do not bring other children with you. Please wear simple, loose fitting clothing to the hospital.  Vani Taveras not bring valuables (money, credit cards, checkbooks, etc.) Do not wear any makeup (including no eye makeup) or nail polish on your fingers or toes.     DO NOT wear any jewelry or piercings on day of surgery. All body piercing jewelry must be removed. Shower the night before surgery with __x_Antibacterial soap /JOHN WIPES________    TOTAL JOINT REPLACEMENT/HYSTERECTOMY PATIENTS ONLY---Remember to bring Blood Bank bracelet to the hospital on the day of surgery. If you have a Living Will and Durable Power of  for Healthcare, please bring in a copy. If appropriate bring crutches, inspirex, WALKER, CANE etc... Notify your Surgeon if you develop any illness between now and surgery time, cough, cold, fever, sore throat, nausea, vomiting, etc.  Please notify your surgeon if you experience dizziness, shortness of breath or blurred vision between now & the time of your surgery. If you have ___dentures, they will be removed before going to the OR; we will provide you a container. If you wear ___contact lenses or ___glasses, they will be removed; please bring a case for them. To provide excellent care visitors will be limited to 2 in the room at any given time. Please bring picture ID and insurance card. During flu season no children under the age of 15 are permitted in the hospital for the safety of all patients. Other check in at the  in the main lobby                 Please call AMBULATORY CARE if you have any further questions.    [facility]     [address]

## 2023-02-17 NOTE — TELEPHONE ENCOUNTER
Attempted contacting patient's wife with an update that the [facility] was faxed the clinicals again for oncology referral authorizations. No answer, left voice message to call clinic at [phone]. Contacted [facility], spoke with Payton Webber, provided with the update. Informed the clinic , YESSENIA Morris said he spoke with patient today and he wants to have his medi-port surgery on 02/20/2023 as scheduled. Kylie Gillespie  RN, ADN, BSE, OCN  Patient Nurse Navigator

## 2023-02-17 NOTE — TELEPHONE ENCOUNTER
Received call back from Kian Colvin, community care coordinator at the [facility], this afternoon stating they have not received a referral for oncology services from patient's PCP, Dr. Raymon Ortiz at the [facility]. She said if they are contacted today the services may be processed today and authorized. She provided Dr. Javier Sales'  nurse Ashli Lares contact number [phone]. This nurse navigator called the [facility], left voice message for Ashli Lares with this clinic's contact number. Geetha Thurman  RN, ADN, BSE, OCN  Patient Nurse Navigator

## 2023-02-17 NOTE — TELEPHONE ENCOUNTER
Patient's wife, Asiya Morrison, called in with questions regarding patient's primary payer and prior authorization. Explained to her that patient has a current prior authorization through [facility] as he does not have a prior authorization for Oncology from the [employer]. The prior authorization from the 's association only covers Pulmonary and Dr. Mauri Still. If patient wants a prior authorization through the 's association for Oncology, he must have his family doctor place an order for a Community care consult for Oncology, them the patient must go to the [employer], be evaluated by the [employer] there, and then that doctor must place orders and approve for patient to have care provided here by Dr. Roger Real at [address] [place] [address], due to the distance of driving to [employer], is to much for patient. Patient's wife voiced understanding and stated \"We have not been to [employer] to do that. \" Instructed  patient and his wife, to call his primary care doctor, now, and ask for the orders and to start this process, as this process takes time. Until the mean time, patient's chemotherapy will be billed under [employer] until approved by the 's association. Patient's wife voiced understanding and is agreeable with the plan.

## 2023-02-17 NOTE — TELEPHONE ENCOUNTER
Received call back from [facility] at the [facility], she asked to send clinical information to them at [phone] so they can get started on the authorizations. She stated once they have the information they'll forward it to Rob Pimentel RN at the [facility]. Elmo Newsome RN, ADN, BSE, OCN  Patient Nurse Navigator

## 2023-02-17 NOTE — TELEPHONE ENCOUNTER
SW left message for pt's wife, Martin Wheatley, and then contacted other listed number and spoke with pt. Pt reported that he was going to go for his surgery on Monday 2/20/23 either way at this point. Pt was informed that he had already completed the financial aid at the hospital.  Pt reported that he would follow up with the clinic the following week.         Vee Garcia MSW, VONW-S  Oncology Social Worker

## 2023-02-20 ENCOUNTER — ANESTHESIA (OUTPATIENT)
Dept: OPERATING ROOM | Age: 68
End: 2023-02-20
Payer: MEDICARE

## 2023-02-20 ENCOUNTER — APPOINTMENT (OUTPATIENT)
Dept: GENERAL RADIOLOGY | Age: 68
End: 2023-02-20
Attending: SURGERY
Payer: MEDICARE

## 2023-02-20 ENCOUNTER — HOSPITAL ENCOUNTER (OUTPATIENT)
Age: 68
Setting detail: OUTPATIENT SURGERY
Discharge: HOME | End: 2023-02-20
Attending: SURGERY | Admitting: SURGERY
Payer: MEDICARE

## 2023-02-20 ENCOUNTER — TELEPHONE (OUTPATIENT)
Dept: INFUSION THERAPY | Age: 68
End: 2023-02-20

## 2023-02-20 VITALS
HEART RATE: 86 BPM | HEIGHT: 73 IN | BODY MASS INDEX: 30.35 KG/M2 | DIASTOLIC BLOOD PRESSURE: 75 MMHG | SYSTOLIC BLOOD PRESSURE: 151 MMHG | RESPIRATION RATE: 15 BRPM | TEMPERATURE: 97.7 F | OXYGEN SATURATION: 93 % | WEIGHT: 229 LBS

## 2023-02-20 DIAGNOSIS — I87.8 POOR VENOUS ACCESS: ICD-10-CM

## 2023-02-20 LAB
APTT: 37.3 SEC (ref 24.5–35.1)
BASOPHILS ABSOLUTE: 0.12 E9/L (ref 0–0.2)
BASOPHILS RELATIVE PERCENT: 0.9 % (ref 0–2)
EOSINOPHILS ABSOLUTE: 0 E9/L (ref 0.05–0.5)
EOSINOPHILS RELATIVE PERCENT: 0.3 % (ref 0–6)
HCT VFR BLD CALC: 38 % (ref 37–54)
HEMOGLOBIN: 12.9 G/DL (ref 12.5–16.5)
INR BLD: 1
LYMPHOCYTES ABSOLUTE: 2.35 E9/L (ref 1.5–4)
LYMPHOCYTES RELATIVE PERCENT: 16.7 % (ref 20–42)
MCH RBC QN AUTO: 28.7 PG (ref 26–35)
MCHC RBC AUTO-ENTMCNC: 33.9 % (ref 32–34.5)
MCV RBC AUTO: 84.4 FL (ref 80–99.9)
MONOCYTES ABSOLUTE: 1.1 E9/L (ref 0.1–0.95)
MONOCYTES RELATIVE PERCENT: 7.9 % (ref 2–12)
MYELOCYTE PERCENT: 0.9 % (ref 0–0)
NEUTROPHILS ABSOLUTE: 10.35 E9/L (ref 1.8–7.3)
NEUTROPHILS RELATIVE PERCENT: 73.7 % (ref 43–80)
PDW BLD-RTO: 15.1 FL (ref 11.5–15)
PLATELET # BLD: 782 E9/L (ref 130–450)
PMV BLD AUTO: 7.8 FL (ref 7–12)
PROTHROMBIN TIME: 11.5 SEC (ref 9.3–12.4)
RBC # BLD: 4.5 E12/L (ref 3.8–5.8)
WBC # BLD: 13.8 E9/L (ref 4.5–11.5)

## 2023-02-20 PROCEDURE — 3700000001 HC ADD 15 MINUTES (ANESTHESIA): Performed by: SURGERY

## 2023-02-20 PROCEDURE — C1788 PORT, INDWELLING, IMP: HCPCS | Performed by: SURGERY

## 2023-02-20 PROCEDURE — 36415 COLL VENOUS BLD VENIPUNCTURE: CPT

## 2023-02-20 PROCEDURE — 6360000002 HC RX W HCPCS: Performed by: SURGERY

## 2023-02-20 PROCEDURE — 3700000000 HC ANESTHESIA ATTENDED CARE: Performed by: SURGERY

## 2023-02-20 PROCEDURE — 7100000011 HC PHASE II RECOVERY - ADDTL 15 MIN: Performed by: SURGERY

## 2023-02-20 PROCEDURE — 71045 X-RAY EXAM CHEST 1 VIEW: CPT

## 2023-02-20 PROCEDURE — 77001 FLUOROGUIDE FOR VEIN DEVICE: CPT | Performed by: SURGERY

## 2023-02-20 PROCEDURE — 85025 COMPLETE CBC W/AUTO DIFF WBC: CPT

## 2023-02-20 PROCEDURE — 85610 PROTHROMBIN TIME: CPT

## 2023-02-20 PROCEDURE — 2500000003 HC RX 250 WO HCPCS: Performed by: SURGERY

## 2023-02-20 PROCEDURE — 2580000003 HC RX 258: Performed by: SURGERY

## 2023-02-20 PROCEDURE — 3600000003 HC SURGERY LEVEL 3 BASE: Performed by: SURGERY

## 2023-02-20 PROCEDURE — 99024 POSTOP FOLLOW-UP VISIT: CPT | Performed by: SURGERY

## 2023-02-20 PROCEDURE — 36561 INSERT TUNNELED CV CATH: CPT | Performed by: SURGERY

## 2023-02-20 PROCEDURE — 3600000013 HC SURGERY LEVEL 3 ADDTL 15MIN: Performed by: SURGERY

## 2023-02-20 PROCEDURE — 3209999900 FLUORO FOR SURGICAL PROCEDURES

## 2023-02-20 PROCEDURE — 85730 THROMBOPLASTIN TIME PARTIAL: CPT

## 2023-02-20 PROCEDURE — C1881 DIALYSIS ACCESS SYSTEM: HCPCS | Performed by: SURGERY

## 2023-02-20 PROCEDURE — A4216 STERILE WATER/SALINE, 10 ML: HCPCS | Performed by: SURGERY

## 2023-02-20 PROCEDURE — 2709999900 HC NON-CHARGEABLE SUPPLY: Performed by: SURGERY

## 2023-02-20 PROCEDURE — 7100000010 HC PHASE II RECOVERY - FIRST 15 MIN: Performed by: SURGERY

## 2023-02-20 PROCEDURE — 6360000002 HC RX W HCPCS: Performed by: NURSE ANESTHETIST, CERTIFIED REGISTERED

## 2023-02-20 DEVICE — PORT SMARTPORT 8FR CT TI W/VLV SHTH: Type: IMPLANTABLE DEVICE | Site: CHEST | Status: FUNCTIONAL

## 2023-02-20 RX ORDER — BUPIVACAINE HYDROCHLORIDE AND EPINEPHRINE 2.5; 5 MG/ML; UG/ML
INJECTION, SOLUTION EPIDURAL; INFILTRATION; INTRACAUDAL; PERINEURAL PRN
Status: DISCONTINUED | OUTPATIENT
Start: 2023-02-20 | End: 2023-02-20 | Stop reason: ALTCHOICE

## 2023-02-20 RX ORDER — IPRATROPIUM BROMIDE AND ALBUTEROL SULFATE 2.5; .5 MG/3ML; MG/3ML
1 SOLUTION RESPIRATORY (INHALATION)
Status: CANCELLED | OUTPATIENT
Start: 2023-02-20 | End: 2023-02-21

## 2023-02-20 RX ORDER — HEPARIN SODIUM (PORCINE) LOCK FLUSH IV SOLN 100 UNIT/ML 100 UNIT/ML
SOLUTION INTRAVENOUS PRN
Status: DISCONTINUED | OUTPATIENT
Start: 2023-02-20 | End: 2023-02-20 | Stop reason: ALTCHOICE

## 2023-02-20 RX ORDER — PROPOFOL 10 MG/ML
INJECTION, EMULSION INTRAVENOUS PRN
Status: DISCONTINUED | OUTPATIENT
Start: 2023-02-20 | End: 2023-02-20 | Stop reason: SDUPTHER

## 2023-02-20 RX ORDER — FENTANYL CITRATE 50 UG/ML
25 INJECTION, SOLUTION INTRAMUSCULAR; INTRAVENOUS EVERY 5 MIN PRN
Status: CANCELLED | OUTPATIENT
Start: 2023-02-20

## 2023-02-20 RX ORDER — PROCHLORPERAZINE EDISYLATE 5 MG/ML
5 INJECTION INTRAMUSCULAR; INTRAVENOUS
Status: CANCELLED | OUTPATIENT
Start: 2023-02-20 | End: 2023-02-21

## 2023-02-20 RX ORDER — HYDRALAZINE HYDROCHLORIDE 20 MG/ML
10 INJECTION INTRAMUSCULAR; INTRAVENOUS
Status: CANCELLED | OUTPATIENT
Start: 2023-02-20

## 2023-02-20 RX ORDER — MEPERIDINE HYDROCHLORIDE 25 MG/ML
12.5 INJECTION INTRAMUSCULAR; INTRAVENOUS; SUBCUTANEOUS EVERY 5 MIN PRN
Status: CANCELLED | OUTPATIENT
Start: 2023-02-20

## 2023-02-20 RX ORDER — IBUPROFEN 800 MG/1
800 TABLET ORAL EVERY 6 HOURS PRN
Qty: 20 TABLET | Refills: 0 | Status: SHIPPED | OUTPATIENT
Start: 2023-02-20

## 2023-02-20 RX ORDER — SODIUM CHLORIDE 0.9 % (FLUSH) 0.9 %
5-40 SYRINGE (ML) INJECTION PRN
Status: DISCONTINUED | OUTPATIENT
Start: 2023-02-20 | End: 2023-02-20 | Stop reason: HOSPADM

## 2023-02-20 RX ORDER — LABETALOL HYDROCHLORIDE 5 MG/ML
10 INJECTION, SOLUTION INTRAVENOUS
Status: CANCELLED | OUTPATIENT
Start: 2023-02-20

## 2023-02-20 RX ORDER — SODIUM CHLORIDE 0.9 % (FLUSH) 0.9 %
5-40 SYRINGE (ML) INJECTION EVERY 12 HOURS SCHEDULED
Status: DISCONTINUED | OUTPATIENT
Start: 2023-02-20 | End: 2023-02-20 | Stop reason: HOSPADM

## 2023-02-20 RX ORDER — SODIUM CHLORIDE 9 MG/ML
INJECTION INTRAVENOUS PRN
Status: DISCONTINUED | OUTPATIENT
Start: 2023-02-20 | End: 2023-02-20 | Stop reason: ALTCHOICE

## 2023-02-20 RX ORDER — MORPHINE SULFATE 2 MG/ML
2 INJECTION, SOLUTION INTRAMUSCULAR; INTRAVENOUS EVERY 5 MIN PRN
Status: CANCELLED | OUTPATIENT
Start: 2023-02-20

## 2023-02-20 RX ORDER — FENTANYL CITRATE 50 UG/ML
INJECTION, SOLUTION INTRAMUSCULAR; INTRAVENOUS PRN
Status: DISCONTINUED | OUTPATIENT
Start: 2023-02-20 | End: 2023-02-20 | Stop reason: SDUPTHER

## 2023-02-20 RX ORDER — SODIUM CHLORIDE 9 MG/ML
INJECTION, SOLUTION INTRAVENOUS PRN
Status: DISCONTINUED | OUTPATIENT
Start: 2023-02-20 | End: 2023-02-20 | Stop reason: HOSPADM

## 2023-02-20 RX ORDER — ONDANSETRON 2 MG/ML
4 INJECTION INTRAMUSCULAR; INTRAVENOUS
Status: CANCELLED | OUTPATIENT
Start: 2023-02-20 | End: 2023-02-21

## 2023-02-20 RX ORDER — SODIUM CHLORIDE, SODIUM LACTATE, POTASSIUM CHLORIDE, CALCIUM CHLORIDE 600; 310; 30; 20 MG/100ML; MG/100ML; MG/100ML; MG/100ML
INJECTION, SOLUTION INTRAVENOUS CONTINUOUS
Status: DISCONTINUED | OUTPATIENT
Start: 2023-02-20 | End: 2023-02-20 | Stop reason: HOSPADM

## 2023-02-20 RX ORDER — MIDAZOLAM HYDROCHLORIDE 1 MG/ML
2 INJECTION INTRAMUSCULAR; INTRAVENOUS
Status: CANCELLED | OUTPATIENT
Start: 2023-02-20 | End: 2023-02-21

## 2023-02-20 RX ORDER — DIPHENHYDRAMINE HYDROCHLORIDE 50 MG/ML
12.5 INJECTION INTRAMUSCULAR; INTRAVENOUS
Status: CANCELLED | OUTPATIENT
Start: 2023-02-20 | End: 2023-02-21

## 2023-02-20 RX ADMIN — CEFAZOLIN 2000 MG: 2 INJECTION, POWDER, FOR SOLUTION INTRAMUSCULAR; INTRAVENOUS at 09:51

## 2023-02-20 RX ADMIN — PROPOFOL 50 MG: 10 INJECTION, EMULSION INTRAVENOUS at 09:51

## 2023-02-20 RX ADMIN — SODIUM CHLORIDE, POTASSIUM CHLORIDE, SODIUM LACTATE AND CALCIUM CHLORIDE: 600; 310; 30; 20 INJECTION, SOLUTION INTRAVENOUS at 06:00

## 2023-02-20 RX ADMIN — FENTANYL CITRATE 50 MCG: 50 INJECTION, SOLUTION INTRAMUSCULAR; INTRAVENOUS at 09:52

## 2023-02-20 RX ADMIN — PROPOFOL 100 MCG/KG/MIN: 10 INJECTION, EMULSION INTRAVENOUS at 09:52

## 2023-02-20 RX ADMIN — FENTANYL CITRATE 50 MCG: 50 INJECTION, SOLUTION INTRAMUSCULAR; INTRAVENOUS at 09:46

## 2023-02-20 ASSESSMENT — PAIN - FUNCTIONAL ASSESSMENT: PAIN_FUNCTIONAL_ASSESSMENT: NONE - DENIES PAIN

## 2023-02-20 NOTE — OP NOTE
Anabell Britor  1955      DATE OF PROCEDURE: 2/20/2023     SURGEON: Kristen Munoz M.D.     ASSISTANT: noemí     PREOPERATIVE DIAGNOSIS: poor venous access and lung ca. POSTOPERATIVE DIAGNOSIS: Same    OPERATION: Insertion of left subclavian central venous catheter with subcutaneous reservoir, fluoroscopy     ANESTHESIA: LMAC     ESTIMATED BLOOD LOSS: Minimal     COMPLICATIONS: None    DESCRIPTION OF PROCEDURE: The patient was identified and the procedure was confirmed. The left neck and chest were prepped and draped in the usual sterile fashion. Next, local anesthesic was used for local anesthesia. The left subclavian vein was percutaneously entered and a guidewire was advanced into the superior vena cava under fluoroscopic guidance. A small incision was made around the wire. A second skin incision was made below the clavicle and a pocket was made in the subcutaneous tissue for the reservoir. The catheter was pulled through a subcutaneous tunnel from the inferior chest incision to the access incision. The introducer was passed over the wire then the catheter was passed through the introducer and the tip was positioned in the superior vena cava right atrial junction under fluoroscopic guidance. The catheter was connected to the reservoir and the locking hub was engaged. The port was noted to withdrawal and flush blood easily and was flushed with heparinized saline solution. The reservoir was secured in the pocket with vicryl sutures. Hemostasis was assured and the incisions were irrigated. The incisions were approximated with Vicryl sutures and Dermabond was applied to the incisions in the operating room. Needle, sponge, and instrument counts were reported as correct times two. The patient tolerated the procedure and was transferred back to the floor in satisfactory condition.        María Turcios MD  10:01 AM  2/20/2023

## 2023-02-20 NOTE — DISCHARGE INSTRUCTIONS
Patient Discharge Instructions  Discharge Date:  2/20/2023    Discharged To: Home    RESUME ACTIVITY:      BATHING:  May shower 24hrs after surgery, may bathe 3 days after surgery    DRIVING: No driving while on pain medications    RETURN TO WORK: after follow up appointment    WALKING:  Yes    SEXUAL ACTIVITY: Yes    STAIRS:  Yes    DIET: common adult    BOWELS: constipation is a side effect of your pain meds, take a daily laxative (miralax, dulcolax, etc.) as needed to keep your bowels moving as they normally do, do not go 2-3 days without having a bowel movement. Pain medications;   Pain meds cause constipation so pay close attention to the \"bowels\" topic above. SPECIAL INSTRUCTIONS:     Call the office at [phone] if you have a fever > 100 F, or if your incision becomes red, tender, or drains more than a small amount of clear fluid.     Call  for follow up appointment with Dr. Ilene Brink in:  as needed

## 2023-02-20 NOTE — ANESTHESIA POSTPROCEDURE EVALUATION
Department of Anesthesiology  Postprocedure Note    Patient: Anabell Gramajo  MRN: [id number]  YOB: 1955  Date of evaluation: 2/20/2023      Procedure Summary     Date: 02/20/23 Room / Location: [address] [place] [address] / [address] [place] [address]    Anesthesia Start: 0423 Anesthesia Stop: 5123    Procedure: MEDIPORT CATHETER INSERTION Diagnosis:       Poor venous access      (Poor venous access [I87.8])    Surgeons: María Turcios MD Responsible Provider: Amanda Sheriff MD    Anesthesia Type: MAC ASA Status: 3          Anesthesia Type: MAC    Mayito Phase I: Mayito Score: 10    Mayito Phase II: Mayito Score: 10      Anesthesia Post Evaluation    Patient location during evaluation: PACU  Patient participation: complete - patient participated  Level of consciousness: awake and alert  Airway patency: patent  Nausea & Vomiting: no nausea and no vomiting  Complications: no  Cardiovascular status: hemodynamically stable  Respiratory status: acceptable  Hydration status: euvolemic
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
## 2023-02-20 NOTE — H&P
General Surgery History and Physical    Patient's Name/Date of Birth: Eloisa Faria / 1955    Date: February 20, 2023     Surgeon: Alesha Sierra M.D.    PCP: Kaleb Pelaez MD     Chief Complaint: poor venous access with lung cancer     HPI:   Eloisa Faria is a 74 y.o. male who presents for evaluation of cancer and poor venous access for port placement. Past Medical History:   Diagnosis Date    Aneurysm of abdominal aorta     Anxiety     Cancer ([address] 07/2022    skin-right eye lid, right temple, back of left ear.  09/07/2022: lung    Chronic back pain     COPD (chronic obstructive pulmonary disease) (HCC)     GERD (gastroesophageal reflux disease)     Hearing loss     Hepatitis C     vaccinated    Hyperlipidemia     Hypertension     Intracranial hemorrhage (HCC)     Osteoarthritis     Pneumonia        Past Surgical History:   Procedure Laterality Date    ABDOMINAL AORTIC ANEURYSM REPAIR, ENDOVASCULAR Bilateral 11/10/2022    ENDOVASCULAR REPAIR ABDOMINAL AORTIC ANEURYSM WITH STENT GRAFT performed by Krissy Hassan MD at [address] N/A 08/29/2022    BRONCHOSCOPY BIOPSY BRONCHUS performed by Monty Zuniga MD at [facility] N/A 1/17/2023    BRONCHOSCOPY W/EBUS FNA, CYTOLOGY, PATHOLOGY performed by Mikey Champagne MD at [address] [facility] [address] Right     inguinal    UPPER GASTROINTESTINAL ENDOSCOPY      VASECTOMY         Current Facility-Administered Medications   Medication Dose Route Frequency Provider Last Rate Last Admin    lactated ringers IV soln infusion   IntraVENous Continuous Domenic Mc  mL/hr at 02/20/23 0600 New Bag at 02/20/23 0600    sodium chloride flush 0.9 % injection 5-40 mL  5-40 mL IntraVENous 2 times per day Domenic Mc MD        sodium chloride flush 0.9 % injection 5-40 mL  5-40 mL IntraVENous PRN Domenic Mc MD        0.9 % sodium chloride infusion   IntraVENous PRN Wilber Blue Lucrezia Phalen, MD        ceFAZolin (ANCEF) 2,000 mg in sterile water 20 mL IV syringe  2,000 mg IntraVENous On Call to Romeo Blankenship MD           No Known Allergies    The patient has a family history that is negative for severe cardiovascular or respiratory issues, negative for reaction to anesthesia. Social History     Socioeconomic History    Marital status:      Spouse name: Not on file    Number of children: Not on file    Years of education: Not on file    Highest education level: Not on file   Occupational History    Not on file   Tobacco Use    Smoking status: Former     Packs/day: 0.50     Years: 51.00     Pack years: 25.50     Types: Cigarettes     Start date:      Quit date: 2023     Years since quittin.1     Passive exposure: Current    Smokeless tobacco: Never    Tobacco comments:     Patient states he's trying to quit on his own, taking chantix now   Vaping Use    Vaping Use: Never used   Substance and Sexual Activity    Alcohol use:  Yes     Alcohol/week: 3.0 standard drinks     Types: 3 Cans of beer per week    Drug use: No    Sexual activity: Yes     Partners: Female   Other Topics Concern    Not on file   Social History Narrative    ** Merged History Encounter **          Social Determinants of Health     Financial Resource Strain: Not on file   Food Insecurity: Not on file   Transportation Needs: Not on file   Physical Activity: Not on file   Stress: Not on file   Social Connections: Not on file   Intimate Partner Violence: Not on file   Housing Stability: Not on file           Review of Systems  Review of Systems -  General ROS: negative for - chills, fatigue or malaise  ENT ROS: negative for - hearing change, nasal congestion or nasal discharge  Allergy and Immunology ROS: negative for - hives, itchy/watery eyes or nasal congestion  Hematological and Lymphatic ROS: negative for - blood clots, blood transfusions, bruising or fatigue  Endocrine ROS: negative for - malaise/lethargy, mood swings, palpitations or polydipsia/polyuria  Breast ROS: negative for - new or changing breast lumps or nipple changes  Respiratory ROS: negative for - sputum changes, stridor, tachypnea or wheezing  Cardiovascular ROS: negative for - irregular heartbeat, loss of consciousness, murmur or orthopnea  Gastrointestinal ROS: negative for - constipation, diarrhea, gas/bloating, heartburn or hematemesis  Genito-Urinary ROS: negative for -  genital discharge, genital ulcers or hematuria  Musculoskeletal ROS: negative for - gait disturbance, muscle pain or muscular weakness    Physical exam:   BP (!) 160/78   Pulse 88   Temp 98.4 °F (36.9 °C) (Infrared)   Resp 18   Ht 6' 1\" (1.854 m)   Wt 229 lb (103.9 kg)   SpO2 94%   BMI 30.21 kg/m²   General appearance:  NAD  Pyscho/social: negative for tremors and hallucinations  Head: NCAT, PERRLA, EOMI, red conjunctiva  Neck: supple, no masses  Lungs: CTAB, equal chest rise bilateral  Heart: Reg rate  Abdomen: soft, nontender, nondistended  Skin; no lesions  Gu: no cva tenderness  Extremities: extremities normal, atraumatic, no cyanosis or edema      Assessment:  74 y.o. male with poor venous access and lung cancer for mediport     Plan:  Cbc, cmp, inr  To OR for placement  Discussed the risk, benefits and alternatives of surgery including wound infections, bleeding, scar  and the risks of  anesthetic including MI, CVA, sudden death or reactions to anesthetic medications. The patient understands the risks and alternatives. All questions were answered to the patient's satisfaction and they freely signed the consent.     Andre Brand MD  6:59 AM  2/20/2023

## 2023-02-20 NOTE — TELEPHONE ENCOUNTER
Notified patient's wife, Polo Goldberg, that this office received a prior authorization for treatment from the [employer] for coverage for Dr. Denise Stephenson and his chemotherapy treatments only. The radiation office is still waiting for an approval for their treatment plan form the [employer] at this time. Patient's wife voiced understanding.

## 2023-02-23 ENCOUNTER — HOSPITAL ENCOUNTER (OUTPATIENT)
Dept: INFUSION THERAPY | Age: 68
Discharge: HOME | End: 2023-02-23
Payer: MEDICARE

## 2023-02-23 ENCOUNTER — OFFICE VISIT (OUTPATIENT)
Dept: ONCOLOGY | Age: 68
End: 2023-02-23

## 2023-02-23 VITALS
DIASTOLIC BLOOD PRESSURE: 103 MMHG | OXYGEN SATURATION: 98 % | HEART RATE: 76 BPM | WEIGHT: 238.6 LBS | HEIGHT: 73 IN | SYSTOLIC BLOOD PRESSURE: 177 MMHG | TEMPERATURE: 98.6 F | BODY MASS INDEX: 31.62 KG/M2

## 2023-02-23 DIAGNOSIS — C80.1 MALIGNANT SMALL CELL CANCER (HCC): Primary | ICD-10-CM

## 2023-02-23 DIAGNOSIS — C34.12 SMALL CELL LUNG CANCER, LEFT UPPER LOBE (HCC): ICD-10-CM

## 2023-02-23 LAB
ALBUMIN SERPL-MCNC: 3.9 G/DL (ref 3.5–5.2)
ALP BLD-CCNC: 82 U/L (ref 40–129)
ALT SERPL-CCNC: 20 U/L (ref 0–40)
ANION GAP SERPL CALCULATED.3IONS-SCNC: 10 MMOL/L (ref 7–16)
AST SERPL-CCNC: 26 U/L (ref 0–39)
BASOPHILS ABSOLUTE: 0.21 E9/L (ref 0–0.2)
BASOPHILS RELATIVE PERCENT: 2.7 % (ref 0–2)
BILIRUB SERPL-MCNC: 0.3 MG/DL (ref 0–1.2)
BUN BLDV-MCNC: 11 MG/DL (ref 6–23)
CALCIUM SERPL-MCNC: 9.6 MG/DL (ref 8.6–10.2)
CHLORIDE BLD-SCNC: 100 MMOL/L (ref 98–107)
CO2: 26 MMOL/L (ref 22–29)
CREAT SERPL-MCNC: 0.8 MG/DL (ref 0.7–1.2)
EOSINOPHILS ABSOLUTE: 0.05 E9/L (ref 0.05–0.5)
EOSINOPHILS RELATIVE PERCENT: 0.6 % (ref 0–6)
GFR SERPL CREATININE-BSD FRML MDRD: >60 ML/MIN/1.73
GLUCOSE BLD-MCNC: 100 MG/DL (ref 74–99)
HCT VFR BLD CALC: 39.1 % (ref 37–54)
HEMOGLOBIN: 13.3 G/DL (ref 12.5–16.5)
IMMATURE GRANULOCYTES #: 0.29 E9/L
IMMATURE GRANULOCYTES %: 3.7 % (ref 0–5)
LYMPHOCYTES ABSOLUTE: 2.88 E9/L (ref 1.5–4)
LYMPHOCYTES RELATIVE PERCENT: 36.5 % (ref 20–42)
MCH RBC QN AUTO: 29.1 PG (ref 26–35)
MCHC RBC AUTO-ENTMCNC: 34 % (ref 32–34.5)
MCV RBC AUTO: 85.6 FL (ref 80–99.9)
MONOCYTES ABSOLUTE: 0.94 E9/L (ref 0.1–0.95)
MONOCYTES RELATIVE PERCENT: 11.9 % (ref 2–12)
NEUTROPHILS ABSOLUTE: 3.52 E9/L (ref 1.8–7.3)
NEUTROPHILS RELATIVE PERCENT: 44.6 % (ref 43–80)
PDW BLD-RTO: 15.9 FL (ref 11.5–15)
PLATELET # BLD: 646 E9/L (ref 130–450)
PMV BLD AUTO: 7.8 FL (ref 7–12)
POTASSIUM SERPL-SCNC: 4 MMOL/L (ref 3.5–5)
RBC # BLD: 4.57 E12/L (ref 3.8–5.8)
SODIUM BLD-SCNC: 136 MMOL/L (ref 132–146)
TOTAL PROTEIN: 7.4 G/DL (ref 6.4–8.3)
WBC # BLD: 7.9 E9/L (ref 4.5–11.5)

## 2023-02-23 PROCEDURE — 80053 COMPREHEN METABOLIC PANEL: CPT

## 2023-02-23 PROCEDURE — 85025 COMPLETE CBC W/AUTO DIFF WBC: CPT

## 2023-02-23 PROCEDURE — 36415 COLL VENOUS BLD VENIPUNCTURE: CPT

## 2023-02-23 RX ORDER — ONDANSETRON 2 MG/ML
8 INJECTION INTRAMUSCULAR; INTRAVENOUS
OUTPATIENT
Start: 2023-03-01

## 2023-02-23 RX ORDER — ONDANSETRON 2 MG/ML
8 INJECTION INTRAMUSCULAR; INTRAVENOUS
OUTPATIENT
Start: 2023-02-28

## 2023-02-23 RX ORDER — ALBUTEROL SULFATE 90 UG/1
4 AEROSOL, METERED RESPIRATORY (INHALATION) PRN
OUTPATIENT
Start: 2023-02-28

## 2023-02-23 RX ORDER — SODIUM CHLORIDE 9 MG/ML
5-250 INJECTION, SOLUTION INTRAVENOUS PRN
OUTPATIENT
Start: 2023-02-27

## 2023-02-23 RX ORDER — SODIUM CHLORIDE 9 MG/ML
5-40 INJECTION INTRAVENOUS PRN
OUTPATIENT
Start: 2023-02-27

## 2023-02-23 RX ORDER — SODIUM CHLORIDE 9 MG/ML
5-40 INJECTION INTRAVENOUS PRN
OUTPATIENT
Start: 2023-02-28

## 2023-02-23 RX ORDER — ACETAMINOPHEN 325 MG/1
650 TABLET ORAL
OUTPATIENT
Start: 2023-02-28

## 2023-02-23 RX ORDER — ACETAMINOPHEN 325 MG/1
650 TABLET ORAL
OUTPATIENT
Start: 2023-02-27

## 2023-02-23 RX ORDER — MEPERIDINE HYDROCHLORIDE 25 MG/ML
12.5 INJECTION INTRAMUSCULAR; INTRAVENOUS; SUBCUTANEOUS PRN
OUTPATIENT
Start: 2023-03-01

## 2023-02-23 RX ORDER — DEXAMETHASONE SODIUM PHOSPHATE 10 MG/ML
8 INJECTION, SOLUTION INTRAMUSCULAR; INTRAVENOUS ONCE
OUTPATIENT
Start: 2023-03-01 | End: 2023-03-01

## 2023-02-23 RX ORDER — MEPERIDINE HYDROCHLORIDE 25 MG/ML
12.5 INJECTION INTRAMUSCULAR; INTRAVENOUS; SUBCUTANEOUS PRN
OUTPATIENT
Start: 2023-02-27

## 2023-02-23 RX ORDER — HEPARIN SODIUM (PORCINE) LOCK FLUSH IV SOLN 100 UNIT/ML 100 UNIT/ML
500 SOLUTION INTRAVENOUS PRN
OUTPATIENT
Start: 2023-02-27

## 2023-02-23 RX ORDER — SODIUM CHLORIDE 9 MG/ML
INJECTION, SOLUTION INTRAVENOUS CONTINUOUS
OUTPATIENT
Start: 2023-02-27

## 2023-02-23 RX ORDER — SODIUM CHLORIDE 9 MG/ML
5-40 INJECTION INTRAVENOUS PRN
OUTPATIENT
Start: 2023-03-01

## 2023-02-23 RX ORDER — SODIUM CHLORIDE 9 MG/ML
5-250 INJECTION, SOLUTION INTRAVENOUS PRN
OUTPATIENT
Start: 2023-03-01

## 2023-02-23 RX ORDER — DEXAMETHASONE SODIUM PHOSPHATE 10 MG/ML
8 INJECTION, SOLUTION INTRAMUSCULAR; INTRAVENOUS ONCE
OUTPATIENT
Start: 2023-02-28 | End: 2023-02-28

## 2023-02-23 RX ORDER — DIPHENHYDRAMINE HYDROCHLORIDE 50 MG/ML
50 INJECTION INTRAMUSCULAR; INTRAVENOUS
OUTPATIENT
Start: 2023-03-01

## 2023-02-23 RX ORDER — DEXAMETHASONE SODIUM PHOSPHATE 10 MG/ML
10 INJECTION, SOLUTION INTRAMUSCULAR; INTRAVENOUS ONCE
OUTPATIENT
Start: 2023-02-27 | End: 2023-02-27

## 2023-02-23 RX ORDER — ONDANSETRON 2 MG/ML
8 INJECTION INTRAMUSCULAR; INTRAVENOUS
OUTPATIENT
Start: 2023-02-27

## 2023-02-23 RX ORDER — SODIUM CHLORIDE 9 MG/ML
INJECTION, SOLUTION INTRAVENOUS CONTINUOUS
OUTPATIENT
Start: 2023-02-28

## 2023-02-23 RX ORDER — SODIUM CHLORIDE 9 MG/ML
INJECTION, SOLUTION INTRAVENOUS CONTINUOUS
OUTPATIENT
Start: 2023-03-01

## 2023-02-23 RX ORDER — SODIUM CHLORIDE 9 MG/ML
5-250 INJECTION, SOLUTION INTRAVENOUS PRN
OUTPATIENT
Start: 2023-02-28

## 2023-02-23 RX ORDER — PALONOSETRON HYDROCHLORIDE 0.05 MG/ML
0.25 INJECTION, SOLUTION INTRAVENOUS ONCE
OUTPATIENT
Start: 2023-02-27 | End: 2023-02-27

## 2023-02-23 RX ORDER — ALBUTEROL SULFATE 90 UG/1
4 AEROSOL, METERED RESPIRATORY (INHALATION) PRN
OUTPATIENT
Start: 2023-03-01

## 2023-02-23 RX ORDER — HEPARIN SODIUM (PORCINE) LOCK FLUSH IV SOLN 100 UNIT/ML 100 UNIT/ML
500 SOLUTION INTRAVENOUS PRN
OUTPATIENT
Start: 2023-02-28

## 2023-02-23 RX ORDER — ALBUTEROL SULFATE 90 UG/1
4 AEROSOL, METERED RESPIRATORY (INHALATION) PRN
OUTPATIENT
Start: 2023-02-27

## 2023-02-23 RX ORDER — EPINEPHRINE 1 MG/ML
0.3 INJECTION, SOLUTION, CONCENTRATE INTRAVENOUS PRN
OUTPATIENT
Start: 2023-02-28

## 2023-02-23 RX ORDER — MEPERIDINE HYDROCHLORIDE 25 MG/ML
12.5 INJECTION INTRAMUSCULAR; INTRAVENOUS; SUBCUTANEOUS PRN
OUTPATIENT
Start: 2023-02-28

## 2023-02-23 RX ORDER — EPINEPHRINE 1 MG/ML
0.3 INJECTION, SOLUTION, CONCENTRATE INTRAVENOUS PRN
OUTPATIENT
Start: 2023-02-27

## 2023-02-23 RX ORDER — ACETAMINOPHEN 325 MG/1
650 TABLET ORAL
OUTPATIENT
Start: 2023-03-01

## 2023-02-23 RX ORDER — DIPHENHYDRAMINE HYDROCHLORIDE 50 MG/ML
50 INJECTION INTRAMUSCULAR; INTRAVENOUS
OUTPATIENT
Start: 2023-02-27

## 2023-02-23 RX ORDER — EPINEPHRINE 1 MG/ML
0.3 INJECTION, SOLUTION, CONCENTRATE INTRAVENOUS PRN
OUTPATIENT
Start: 2023-03-01

## 2023-02-23 RX ORDER — DIPHENHYDRAMINE HYDROCHLORIDE 50 MG/ML
50 INJECTION INTRAMUSCULAR; INTRAVENOUS
OUTPATIENT
Start: 2023-02-28

## 2023-02-23 RX ORDER — HEPARIN SODIUM (PORCINE) LOCK FLUSH IV SOLN 100 UNIT/ML 100 UNIT/ML
500 SOLUTION INTRAVENOUS PRN
OUTPATIENT
Start: 2023-03-01
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
## 2023-02-27 ENCOUNTER — HOSPITAL ENCOUNTER (OUTPATIENT)
Dept: INFUSION THERAPY | Age: 68
Discharge: HOME | End: 2023-02-27
Payer: MEDICARE

## 2023-02-27 ENCOUNTER — APPOINTMENT (OUTPATIENT)
Dept: RADIATION ONCOLOGY | Age: 68
End: 2023-02-27
Payer: OTHER GOVERNMENT

## 2023-02-27 VITALS
OXYGEN SATURATION: 98 % | TEMPERATURE: 98.6 F | DIASTOLIC BLOOD PRESSURE: 80 MMHG | SYSTOLIC BLOOD PRESSURE: 177 MMHG | RESPIRATION RATE: 16 BRPM | HEART RATE: 86 BPM

## 2023-02-27 DIAGNOSIS — C80.1 MALIGNANT SMALL CELL CANCER (HCC): Primary | ICD-10-CM

## 2023-02-27 PROCEDURE — 2580000003 HC RX 258: Performed by: INTERNAL MEDICINE

## 2023-02-27 PROCEDURE — 96417 CHEMO IV INFUS EACH ADDL SEQ: CPT

## 2023-02-27 PROCEDURE — 96375 TX/PRO/DX INJ NEW DRUG ADDON: CPT

## 2023-02-27 PROCEDURE — 6360000002 HC RX W HCPCS: Performed by: INTERNAL MEDICINE

## 2023-02-27 PROCEDURE — 96367 TX/PROPH/DG ADDL SEQ IV INF: CPT

## 2023-02-27 PROCEDURE — 96415 CHEMO IV INFUSION ADDL HR: CPT

## 2023-02-27 PROCEDURE — 77014 CHG CT GUIDANCE RADIATION THERAPY FLDS PLACEMENT: CPT | Performed by: RADIOLOGY

## 2023-02-27 PROCEDURE — 96413 CHEMO IV INFUSION 1 HR: CPT

## 2023-02-27 PROCEDURE — 77386 HC NTSTY MODUL RAD TX DLVR CPLX: CPT | Performed by: RADIOLOGY

## 2023-02-27 RX ORDER — DEXAMETHASONE SODIUM PHOSPHATE 10 MG/ML
10 INJECTION INTRAMUSCULAR; INTRAVENOUS ONCE
Status: COMPLETED | OUTPATIENT
Start: 2023-02-27 | End: 2023-02-27

## 2023-02-27 RX ORDER — HEPARIN SODIUM (PORCINE) LOCK FLUSH IV SOLN 100 UNIT/ML 100 UNIT/ML
500 SOLUTION INTRAVENOUS PRN
Status: DISCONTINUED | OUTPATIENT
Start: 2023-02-27 | End: 2023-02-28 | Stop reason: HOSPADM

## 2023-02-27 RX ORDER — SODIUM CHLORIDE 9 MG/ML
5-40 INJECTION INTRAVENOUS PRN
Status: DISCONTINUED | OUTPATIENT
Start: 2023-02-27 | End: 2023-02-28 | Stop reason: HOSPADM

## 2023-02-27 RX ORDER — PALONOSETRON HYDROCHLORIDE 0.05 MG/ML
0.25 INJECTION, SOLUTION INTRAVENOUS ONCE
Status: COMPLETED | OUTPATIENT
Start: 2023-02-27 | End: 2023-02-27

## 2023-02-27 RX ORDER — SODIUM CHLORIDE 9 MG/ML
5-250 INJECTION, SOLUTION INTRAVENOUS PRN
Status: DISCONTINUED | OUTPATIENT
Start: 2023-02-27 | End: 2023-02-28 | Stop reason: HOSPADM

## 2023-02-27 RX ADMIN — POTASSIUM CHLORIDE: 2 INJECTION, SOLUTION, CONCENTRATE INTRAVENOUS at 09:32

## 2023-02-27 RX ADMIN — HEPARIN 500 UNITS: 100 SYRINGE at 13:01

## 2023-02-27 RX ADMIN — SODIUM CHLORIDE, PRESERVATIVE FREE 10 ML: 5 INJECTION INTRAVENOUS at 08:45

## 2023-02-27 RX ADMIN — FOSAPREPITANT 150 MG: 150 INJECTION, POWDER, LYOPHILIZED, FOR SOLUTION INTRAVENOUS at 08:53

## 2023-02-27 RX ADMIN — DEXAMETHASONE SODIUM PHOSPHATE 10 MG: 10 INJECTION INTRAMUSCULAR; INTRAVENOUS at 08:44

## 2023-02-27 RX ADMIN — SODIUM CHLORIDE, PRESERVATIVE FREE 10 ML: 5 INJECTION INTRAVENOUS at 13:01

## 2023-02-27 RX ADMIN — PALONOSETRON 0.25 MG: 0.25 INJECTION, SOLUTION INTRAVENOUS at 08:42

## 2023-02-27 RX ADMIN — SODIUM CHLORIDE, PRESERVATIVE FREE 10 ML: 5 INJECTION INTRAVENOUS at 08:40

## 2023-02-27 RX ADMIN — ETOPOSIDE 240 MG: 20 INJECTION INTRAVENOUS at 11:45

## 2023-02-27 RX ADMIN — SODIUM CHLORIDE 20 ML/HR: 9 INJECTION, SOLUTION INTRAVENOUS at 08:41

## 2023-02-27 NOTE — PROGRESS NOTES
Patient tolerated infusion well. Patient alert and oriented x3.   No distress noted.   Vital signs stable.   Patient denies any new or worsening pain.     Patient educated on signs and symptoms of reaction to medication.   Educated patient on possible side effects and treatment of medication.     Patient verbalized understanding.   Offered patient education an/or discharge material.  Patient declined.   Patient denies any needs.  All questions answered.

## 2023-02-28 ENCOUNTER — HOSPITAL ENCOUNTER (OUTPATIENT)
Dept: INFUSION THERAPY | Age: 68
Discharge: HOME | End: 2023-02-28
Payer: MEDICARE

## 2023-02-28 ENCOUNTER — TELEPHONE (OUTPATIENT)
Dept: RADIATION ONCOLOGY | Age: 68
End: 2023-02-28

## 2023-02-28 ENCOUNTER — APPOINTMENT (OUTPATIENT)
Dept: RADIATION ONCOLOGY | Age: 68
End: 2023-02-28
Payer: OTHER GOVERNMENT

## 2023-02-28 VITALS
SYSTOLIC BLOOD PRESSURE: 163 MMHG | RESPIRATION RATE: 16 BRPM | TEMPERATURE: 98.8 F | DIASTOLIC BLOOD PRESSURE: 91 MMHG | OXYGEN SATURATION: 99 % | HEART RATE: 96 BPM

## 2023-02-28 VITALS
SYSTOLIC BLOOD PRESSURE: 158 MMHG | BODY MASS INDEX: 31.82 KG/M2 | TEMPERATURE: 98.4 F | WEIGHT: 241.2 LBS | HEART RATE: 99 BPM | RESPIRATION RATE: 18 BRPM | OXYGEN SATURATION: 99 % | DIASTOLIC BLOOD PRESSURE: 92 MMHG

## 2023-02-28 DIAGNOSIS — C34.90 MALIGNANT NEOPLASM OF LUNG, UNSPECIFIED LATERALITY, UNSPECIFIED PART OF LUNG (HCC): Primary | ICD-10-CM

## 2023-02-28 DIAGNOSIS — C80.1 MALIGNANT SMALL CELL CANCER (HCC): Primary | ICD-10-CM

## 2023-02-28 PROCEDURE — 99999 PR OFFICE/OUTPT VISIT,PROCEDURE ONLY: CPT | Performed by: RADIOLOGY

## 2023-02-28 PROCEDURE — 96413 CHEMO IV INFUSION 1 HR: CPT

## 2023-02-28 PROCEDURE — 77386 HC NTSTY MODUL RAD TX DLVR CPLX: CPT | Performed by: RADIOLOGY

## 2023-02-28 PROCEDURE — 2580000003 HC RX 258: Performed by: INTERNAL MEDICINE

## 2023-02-28 PROCEDURE — 96375 TX/PRO/DX INJ NEW DRUG ADDON: CPT

## 2023-02-28 PROCEDURE — 77014 CHG CT GUIDANCE RADIATION THERAPY FLDS PLACEMENT: CPT | Performed by: RADIOLOGY

## 2023-02-28 PROCEDURE — 6360000002 HC RX W HCPCS: Performed by: INTERNAL MEDICINE

## 2023-02-28 RX ORDER — DEXAMETHASONE SODIUM PHOSPHATE 10 MG/ML
8 INJECTION INTRAMUSCULAR; INTRAVENOUS ONCE
Status: COMPLETED | OUTPATIENT
Start: 2023-02-28 | End: 2023-02-28

## 2023-02-28 RX ORDER — HEPARIN SODIUM (PORCINE) LOCK FLUSH IV SOLN 100 UNIT/ML 100 UNIT/ML
500 SOLUTION INTRAVENOUS PRN
Status: DISCONTINUED | OUTPATIENT
Start: 2023-02-28 | End: 2023-03-01 | Stop reason: HOSPADM

## 2023-02-28 RX ORDER — SODIUM CHLORIDE 9 MG/ML
5-250 INJECTION, SOLUTION INTRAVENOUS PRN
Status: DISCONTINUED | OUTPATIENT
Start: 2023-02-28 | End: 2023-03-01 | Stop reason: HOSPADM

## 2023-02-28 RX ORDER — SODIUM CHLORIDE 9 MG/ML
5-40 INJECTION INTRAVENOUS PRN
Status: DISCONTINUED | OUTPATIENT
Start: 2023-02-28 | End: 2023-03-01 | Stop reason: HOSPADM

## 2023-02-28 RX ADMIN — ETOPOSIDE 240 MG: 20 INJECTION INTRAVENOUS at 10:35

## 2023-02-28 RX ADMIN — SODIUM CHLORIDE 25 ML/HR: 9 INJECTION, SOLUTION INTRAVENOUS at 09:45

## 2023-02-28 RX ADMIN — Medication 500 UNITS: at 11:57

## 2023-02-28 RX ADMIN — DEXAMETHASONE SODIUM PHOSPHATE 8 MG: 10 INJECTION INTRAMUSCULAR; INTRAVENOUS at 09:58

## 2023-02-28 RX ADMIN — SODIUM CHLORIDE, PRESERVATIVE FREE 10 ML: 5 INJECTION INTRAVENOUS at 11:57

## 2023-02-28 NOTE — PROGRESS NOTES
DEPARTMENT OF RADIATION ONCOLOGY ON TREATMENT VISIT         2/28/2023      NAME:  Bisi Atkinson    YOB: 1955    Diagnosis: lung cancer    SUBJECTIVE:   Bisi Atkinson has now received fractionated external beam radiation therapy - ongoing. Past medical, surgical, social and family histories reviewed and updated as indicated. Pain: controlled    ALLERGIES:  Patient has no known allergies. Current Outpatient Medications   Medication Sig Dispense Refill    ibuprofen (ADVIL;MOTRIN) 800 MG tablet Take 1 tablet by mouth every 6 hours as needed for Pain 20 tablet 0    prochlorperazine (COMPAZINE) 10 MG tablet Take 1 tablet by mouth every 6 hours as needed (for nausea/vomiting) 40 tablet 3    tiotropium (SPIRIVA RESPIMAT) 2.5 MCG/ACT AERS inhaler Inhale 2 puffs into the lungs daily      varenicline (CHANTIX) 0.5 MG tablet Take 0.5 mg by mouth daily Follow dosing quidelines      varenicline (CHANTIX) 1 MG tablet Take 1 mg by mouth in the morning and at bedtime After completing first dosing cycle. aspirin 81 MG EC tablet Take 1 tablet by mouth daily 30 tablet 3    lisinopril (PRINIVIL;ZESTRIL) 10 MG tablet Take 10 mg by mouth daily      Misc Natural Products (URINOZINC PO) Take by mouth daily      pantoprazole (PROTONIX) 40 MG tablet Take 40 mg by mouth daily      clopidogrel (PLAVIX) 75 MG tablet Take 1 tablet by mouth daily 30 tablet 0    albuterol sulfate HFA (VENTOLIN HFA) 108 (90 Base) MCG/ACT inhaler Inhale 2 puffs into the lungs 4 times daily as needed for Wheezing 18 g 5    atorvastatin (LIPITOR) 40 MG tablet Take 1 tablet by mouth daily 30 tablet 3    amLODIPine (NORVASC) 10 MG tablet Take 10 mg by mouth daily       No current facility-administered medications for this encounter. OBJECTIVE:  Alert and fully ambulatory. Pleasant and conversant. Physical Examination: General appearance - alert, well appearing, and in no distress.           Wt Readings from Last 3 Encounters:   02/28/23 241 lb 3.2 oz (109.4 kg)   02/23/23 238 lb 9.6 oz (108.2 kg)   02/20/23 229 lb (103.9 kg)         ASSESSMENT/PLAN:     Patient is tolerating treatments well with expected toxicities. RBA were reviewed prior to first fraction and PRN. Current and planned dose reviewed. Goals of treatment and potential side effects were reviewed with the patient PRN. Treatment imaging has been personally reviewed for accuracy and precision. Questions answered to apparent satisfaction. Treatments will continue as planned. Pavan Nieto.  Jessica Solis MD MS DABR  Radiation Oncologist        [facility] ([address] [place] [address]): [phone] /// FAX: [phone]  [facility]): [phone] /// FAX: [phone]  [address] [place] [address]): [phone] /// FAX: [phone]

## 2023-02-28 NOTE — PATIENT INSTRUCTIONS
Continue daily fractionated radiation therapy as scheduled. Please see weekly OTV note and intial consultation letter in [facility] \[facility]\"" for clinical details. Marly Marinelli. Eliza Allison MD, MS [facility]:  [phone]   FAX: [phone]  [address]:  [phone]   FAX:    [phone] [address]:  [phone]   FAX:  [phone]  Email: [email or web address]

## 2023-02-28 NOTE — TELEPHONE ENCOUNTER
Brenda Hernadez  2/28/2023  Wt Readings from Last 10 Encounters:   02/28/23 241 lb 3.2 oz (109.4 kg)   02/23/23 238 lb 9.6 oz (108.2 kg)   02/20/23 229 lb (103.9 kg)   02/09/23 239 lb 11.2 oz (108.7 kg)   01/27/23 246 lb 12.8 oz (111.9 kg)   01/26/23 244 lb 4.8 oz (110.8 kg)   01/17/23 241 lb (109.3 kg)   12/22/22 243 lb 3.2 oz (110.3 kg)   11/10/22 241 lb (109.3 kg)   10/28/22 241 lb (109.3 kg)     Ht Readings from Last 1 Encounters:   02/23/23 6' 1\" (1.854 m)     BMI=31.82    Assessment: Met with Darling Taylorvickie while in for radiation treatment today. He is receiving concurrent chemo/rad treatment for lung cancer. Darling Taylorvickie reports he is not experiencing any N/V/D/C, taste changes, temperature sensitivity or mouth sores. He states he is eating more now because he isn't working and has more time and is bored. Stressed increase in metabolic needs and encouraged him to even add 2-3 snacks per day to help meet his needs. He voiced understanding. His BP was elevated. He said probably because he drank 3 cups of coffee. Encouraged reduction of coffee to 1 cup per day, and increase in water. He said he would try. Encouraged him to call with questions or concerns. Weight change: 2.26% weight loss x 1 month. Usual body weight 240#. No significant weight change. Appetite: Good appetite, increased since not working due to he has more time and is bored. Eating 2-3 meals per day  Nutritional Side Effects: None  Calculated Needs: 26-28kcal/kg/ZZE=1924-7227kdfxb, 1.3-1.5gm/kg/OOZ=074-002rz protein, 1ml/kcal=2800-3000ml fluids  Malnutrition Status: At risk    Recommendations: Eat 3 meals per day and add 2-3 snacks per day to meet increased metabolic needs due to lung cancer and treatment.      Thai Bland RD

## 2023-02-28 NOTE — PROGRESS NOTES
[facility]  [facility]  Wt Readings from Last 3 Encounters:   23 241 lb 3.2 oz (109.4 kg)   23 238 lb 9.6 oz (108.2 kg)   23 229 lb (103.9 kg)     Body mass index is 31.82 kg/m². Treatment Area:ILENE    Patient was seen today for weekly visit. Comfort Alteration  KPS:80%  Fatigue: None    Ventilation Alterations  Cough: Yes  Hemoptysis: No  Mucus Color: none  Dyspnea: No  O2 Sat: 99%    Nutritional Alteration  Anorexia: No  Nausea: No   Vomiting: No     Skin Alteration   Sensation:good    Radiation Dermatitis:  no    Mucous Membrane Alteration  Voice Changes/ Stridor/Larynx: no  Pharynx & Esophagus: none    Elimination Alterations  Constipation: no  Diarrhea:  no      Emotional  Coping: effective      Injury, potential bleeding or infection: no    Other:na    Lab Results   Component Value Date    WBC 7.9 2023     (H) 2023         BP (!) 158/92   Pulse 99   Temp 98.4 °F (36.9 °C) (Temporal)   Resp 18   Wt 241 lb 3.2 oz (109.4 kg)   SpO2 99%   BMI 31.82 kg/m²   BP within normal range? no   -if no, manually recheck in 5-10 min  NEW BP readin/88  BP within normal range? no   -if no, notify attending provider for further instruction. Called patient's PCP, Dr. Nancy Rice at the [facility] and spoke with Gonsalo Mccain to report elevated BP readings of 201/107 RUE-automatically, 162/88--RUE, 158/92. Tonia's recommendation is for Elena Rne to go to the ED. Informed Gonsalo Mccain that I had informed Elena Ren that he should go to an ED or Urgent care if he begins to feel symptomatic at all. Patient denies any headaches or SOB, nosebleeds, or any chest pains. Patient does have appointment next door in Med Onc following this appointment today. Assessment/Plan::400/6600cGy completed and tolerating.     Samantha Restrepo RN
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
## 2023-02-28 NOTE — TELEPHONE ENCOUNTER
Called Patient's PCP, Dr. Yoselin Garcia office and LVM. Ellie Jimenez fro  their office returned my call. Informed her of patient's elevated BP readings of 201/107 RUE-automatically, 162/88--RUE, 158/92. Tonia's recommendation is for Bernardo Brunner to go to the ED. Informed Ellie Jimenez that I had informed Bernardo Brunner that he should go to an ED or Urgent care if he begins to feel symptomatic at all. Patient denies any headaches or SOB, nosebleeds, or any chest pains. Patient does have appointment next door in Med Onc following this appointment today. Ellie Jimenez stated patient has appointment scheduled in April, but will need to contact the patient to be seen in their office earlier than that next scheduled appointment.

## 2023-03-01 ENCOUNTER — HOSPITAL ENCOUNTER (OUTPATIENT)
Dept: RADIATION ONCOLOGY | Age: 68
Discharge: HOME | End: 2023-03-01
Payer: OTHER GOVERNMENT

## 2023-03-01 ENCOUNTER — HOSPITAL ENCOUNTER (OUTPATIENT)
Dept: INFUSION THERAPY | Age: 68
Discharge: HOME | End: 2023-03-01
Payer: MEDICARE

## 2023-03-01 VITALS
HEART RATE: 85 BPM | TEMPERATURE: 98.2 F | DIASTOLIC BLOOD PRESSURE: 80 MMHG | RESPIRATION RATE: 16 BRPM | OXYGEN SATURATION: 98 % | SYSTOLIC BLOOD PRESSURE: 168 MMHG

## 2023-03-01 DIAGNOSIS — C80.1 MALIGNANT SMALL CELL CANCER (HCC): Primary | ICD-10-CM

## 2023-03-01 PROCEDURE — 96375 TX/PRO/DX INJ NEW DRUG ADDON: CPT

## 2023-03-01 PROCEDURE — 96413 CHEMO IV INFUSION 1 HR: CPT

## 2023-03-01 PROCEDURE — 77386 HC NTSTY MODUL RAD TX DLVR CPLX: CPT | Performed by: RADIOLOGY

## 2023-03-01 PROCEDURE — 6360000002 HC RX W HCPCS: Performed by: INTERNAL MEDICINE

## 2023-03-01 PROCEDURE — 2580000003 HC RX 258: Performed by: INTERNAL MEDICINE

## 2023-03-01 RX ORDER — LIDOCAINE AND PRILOCAINE 25; 25 MG/G; MG/G
CREAM TOPICAL
Qty: 5 G | Refills: 1 | Status: SHIPPED | OUTPATIENT
Start: 2023-03-01

## 2023-03-01 RX ORDER — SODIUM CHLORIDE 9 MG/ML
5-40 INJECTION INTRAVENOUS PRN
Status: DISCONTINUED | OUTPATIENT
Start: 2023-03-01 | End: 2023-03-02 | Stop reason: HOSPADM

## 2023-03-01 RX ORDER — HEPARIN SODIUM (PORCINE) LOCK FLUSH IV SOLN 100 UNIT/ML 100 UNIT/ML
500 SOLUTION INTRAVENOUS PRN
Status: DISCONTINUED | OUTPATIENT
Start: 2023-03-01 | End: 2023-03-02 | Stop reason: HOSPADM

## 2023-03-01 RX ORDER — SODIUM CHLORIDE 9 MG/ML
5-250 INJECTION, SOLUTION INTRAVENOUS PRN
Status: DISCONTINUED | OUTPATIENT
Start: 2023-03-01 | End: 2023-03-02 | Stop reason: HOSPADM

## 2023-03-01 RX ORDER — DEXAMETHASONE SODIUM PHOSPHATE 10 MG/ML
8 INJECTION INTRAMUSCULAR; INTRAVENOUS ONCE
Status: COMPLETED | OUTPATIENT
Start: 2023-03-01 | End: 2023-03-01

## 2023-03-01 RX ADMIN — SODIUM CHLORIDE 20 ML/HR: 9 INJECTION, SOLUTION INTRAVENOUS at 09:18

## 2023-03-01 RX ADMIN — ETOPOSIDE 240 MG: 20 INJECTION INTRAVENOUS at 09:52

## 2023-03-01 RX ADMIN — SODIUM CHLORIDE, PRESERVATIVE FREE 10 ML: 5 INJECTION INTRAVENOUS at 09:18

## 2023-03-01 RX ADMIN — Medication 500 UNITS: at 11:10

## 2023-03-01 RX ADMIN — DEXAMETHASONE SODIUM PHOSPHATE 8 MG: 10 INJECTION INTRAMUSCULAR; INTRAVENOUS at 09:31

## 2023-03-01 RX ADMIN — SODIUM CHLORIDE, PRESERVATIVE FREE 10 ML: 5 INJECTION INTRAVENOUS at 11:10

## 2023-03-01 NOTE — PROGRESS NOTES
Patient tolerated infusion well Send Emla script to [facility] and [facility] and instructed patient on application (he wanted the emla). Vitals stable  BP always runs a little high after treatment. Asymptomatic.  Daniele Pantoja was notified of BP [id number]

## 2023-03-02 ENCOUNTER — HOSPITAL ENCOUNTER (OUTPATIENT)
Dept: RADIATION ONCOLOGY | Age: 68
Discharge: HOME | End: 2023-03-02
Payer: OTHER GOVERNMENT

## 2023-03-02 PROCEDURE — 77386 HC NTSTY MODUL RAD TX DLVR CPLX: CPT | Performed by: RADIOLOGY

## 2023-03-03 ENCOUNTER — HOSPITAL ENCOUNTER (OUTPATIENT)
Dept: RADIATION ONCOLOGY | Age: 68
Discharge: HOME | End: 2023-03-03
Payer: OTHER GOVERNMENT

## 2023-03-03 PROCEDURE — 77336 RADIATION PHYSICS CONSULT: CPT | Performed by: RADIOLOGY

## 2023-03-03 PROCEDURE — 77386 HC NTSTY MODUL RAD TX DLVR CPLX: CPT | Performed by: RADIOLOGY

## 2023-03-06 ENCOUNTER — HOSPITAL ENCOUNTER (OUTPATIENT)
Dept: RADIATION ONCOLOGY | Age: 68
Discharge: HOME | End: 2023-03-06
Payer: OTHER GOVERNMENT

## 2023-03-06 PROCEDURE — 77014 CHG CT GUIDANCE RADIATION THERAPY FLDS PLACEMENT: CPT | Performed by: RADIOLOGY

## 2023-03-06 PROCEDURE — 77386 HC NTSTY MODUL RAD TX DLVR CPLX: CPT | Performed by: RADIOLOGY

## 2023-03-07 ENCOUNTER — HOSPITAL ENCOUNTER (OUTPATIENT)
Dept: RADIATION ONCOLOGY | Age: 68
Discharge: HOME | End: 2023-03-07
Payer: OTHER GOVERNMENT

## 2023-03-07 VITALS
OXYGEN SATURATION: 98 % | RESPIRATION RATE: 18 BRPM | TEMPERATURE: 98.6 F | WEIGHT: 238.6 LBS | BODY MASS INDEX: 31.48 KG/M2 | HEART RATE: 89 BPM

## 2023-03-07 DIAGNOSIS — C34.90 MALIGNANT NEOPLASM OF LUNG, UNSPECIFIED LATERALITY, UNSPECIFIED PART OF LUNG (HCC): Primary | ICD-10-CM

## 2023-03-07 PROCEDURE — 99999 PR OFFICE/OUTPT VISIT,PROCEDURE ONLY: CPT | Performed by: RADIOLOGY

## 2023-03-07 PROCEDURE — 77014 CHG CT GUIDANCE RADIATION THERAPY FLDS PLACEMENT: CPT | Performed by: RADIOLOGY

## 2023-03-07 PROCEDURE — 77386 HC NTSTY MODUL RAD TX DLVR CPLX: CPT | Performed by: RADIOLOGY

## 2023-03-07 NOTE — PROGRESS NOTES
DEPARTMENT OF RADIATION ONCOLOGY ON TREATMENT VISIT         3/7/2023      NAME:  Elvia Yu    YOB: 1955    Diagnosis: lung cancer    SUBJECTIVE:   Elvia Yu has now received fractionated external beam radiation therapy - ongoing. Past medical, surgical, social and family histories reviewed and updated as indicated. Pain: controlled    ALLERGIES:  Patient has no known allergies. Current Outpatient Medications   Medication Sig Dispense Refill    lidocaine-prilocaine (EMLA) 2.5-2.5 % cream Apply topically as needed. 5 g 1    ibuprofen (ADVIL;MOTRIN) 800 MG tablet Take 1 tablet by mouth every 6 hours as needed for Pain 20 tablet 0    prochlorperazine (COMPAZINE) 10 MG tablet Take 1 tablet by mouth every 6 hours as needed (for nausea/vomiting) 40 tablet 3    tiotropium (SPIRIVA RESPIMAT) 2.5 MCG/ACT AERS inhaler Inhale 2 puffs into the lungs daily      varenicline (CHANTIX) 0.5 MG tablet Take 0.5 mg by mouth daily Follow dosing quidelines      varenicline (CHANTIX) 1 MG tablet Take 1 mg by mouth in the morning and at bedtime After completing first dosing cycle. aspirin 81 MG EC tablet Take 1 tablet by mouth daily 30 tablet 3    lisinopril (PRINIVIL;ZESTRIL) 10 MG tablet Take 10 mg by mouth daily      Misc Natural Products (URINOZINC PO) Take by mouth daily      pantoprazole (PROTONIX) 40 MG tablet Take 40 mg by mouth daily      clopidogrel (PLAVIX) 75 MG tablet Take 1 tablet by mouth daily 30 tablet 0    albuterol sulfate HFA (VENTOLIN HFA) 108 (90 Base) MCG/ACT inhaler Inhale 2 puffs into the lungs 4 times daily as needed for Wheezing 18 g 5    atorvastatin (LIPITOR) 40 MG tablet Take 1 tablet by mouth daily 30 tablet 3    amLODIPine (NORVASC) 10 MG tablet Take 10 mg by mouth daily       No current facility-administered medications for this encounter. OBJECTIVE:  Alert and fully ambulatory. Pleasant and conversant.         Physical Examination: General appearance - alert, well appearing, and in no distress. Wt Readings from Last 3 Encounters:   03/07/23 238 lb 9.6 oz (108.2 kg)   02/28/23 241 lb 3.2 oz (109.4 kg)   02/23/23 238 lb 9.6 oz (108.2 kg)         ASSESSMENT/PLAN:     Patient is tolerating treatments well with expected toxicities. RBA were reviewed prior to first fraction and PRN. Current and planned dose reviewed. Goals of treatment and potential side effects were reviewed with the patient PRN. Treatment imaging has been personally reviewed for accuracy and precision. Questions answered to apparent satisfaction. Treatments will continue as planned. Siria Perry.  Erum Jacinto MD MS DABR  Radiation Oncologist        [facility] ([address] [place] [address]): [phone] /// FAX: [phone]  [facility]): [phone] /// FAX: [phone]  [address] [place] [address]): [phone] /// FAX: [phone]

## 2023-03-07 NOTE — PROGRESS NOTES
Yrn Jessica [facility] [place], operated by [facility]  3/7/2023  Wt Readings from Last 3 Encounters:   23 238 lb 9.6 oz (108.2 kg)   23 241 lb 3.2 oz (109.4 kg)   23 238 lb 9.6 oz (108.2 kg)     Body mass index is 31.48 kg/m². Treatment Area:ILENE    Patient was seen today for weekly visit. Comfort Alteration  KPS:80%  Fatigue: None    Ventilation Alterations  Cough: Yes  Hemoptysis: No  Mucus Color: none  Dyspnea: No  O2 Sat: 98%    Nutritional Alteration  Anorexia: No  Nausea: No   Vomiting: No     Skin Alteration   Sensation:good    Radiation Dermatitis:  no    Mucous Membrane Alteration  Voice Changes/ Stridor/Larynx: no  Pharynx & Esophagus: normal    Elimination Alterations  Constipation: no  Diarrhea:  no      Emotional  Coping: effective      Injury, potential bleeding or infection: no    Other:na    Lab Results   Component Value Date    WBC 7.9 2023     (H) 2023         Pulse 89   Temp 98.6 °F (37 °C) (Temporal)   Resp 18   Wt 238 lb 9.6 oz (108.2 kg)   SpO2 98%   BMI 31.48 kg/m²   BP within normal range? no   -if no, manually recheck in 5-10 min  NEW BP readin/103  BP within normal range? no   -if no, notify attending provider for further instruction. Patient denies any headaches, states he feels asymptomatic. He states that he did take his BP medication this morning. Encouraged to narciso his PCP, go to an Urgent care or an ED if he begins to have headaches, chest pain. Assessment/Plan:fx;1400/6600cGy completed and tolerating RT well with no complaints.     Shima Kincaid RN

## 2023-03-07 NOTE — PATIENT INSTRUCTIONS
Continue daily fractionated radiation therapy as scheduled. Please see weekly OTV note and intial consultation letter in [facility] for clinical details. Dung Fairbanks. Candido Ann MD MS [address]:  [phone]   FAX: [phone]  [facility] [address] [facility] Street:  [phone]   FAX:    [phone]  [address] [place] [address] Road:  [phone]   FAX:  [phone]  Email: [email or web address]

## 2023-03-08 ENCOUNTER — HOSPITAL ENCOUNTER (OUTPATIENT)
Dept: RADIATION ONCOLOGY | Age: 68
Discharge: HOME | End: 2023-03-08
Payer: OTHER GOVERNMENT

## 2023-03-08 PROCEDURE — 77386 HC NTSTY MODUL RAD TX DLVR CPLX: CPT | Performed by: RADIOLOGY

## 2023-03-08 PROCEDURE — 77014 CHG CT GUIDANCE RADIATION THERAPY FLDS PLACEMENT: CPT | Performed by: RADIOLOGY

## 2023-03-09 ENCOUNTER — HOSPITAL ENCOUNTER (OUTPATIENT)
Dept: RADIATION ONCOLOGY | Age: 68
Discharge: HOME | End: 2023-03-09
Payer: OTHER GOVERNMENT

## 2023-03-09 PROCEDURE — 77386 HC NTSTY MODUL RAD TX DLVR CPLX: CPT | Performed by: RADIOLOGY

## 2023-03-09 PROCEDURE — 77014 CHG CT GUIDANCE RADIATION THERAPY FLDS PLACEMENT: CPT | Performed by: RADIOLOGY

## 2023-03-10 ENCOUNTER — HOSPITAL ENCOUNTER (OUTPATIENT)
Dept: RADIATION ONCOLOGY | Age: 68
Discharge: HOME | End: 2023-03-10
Payer: OTHER GOVERNMENT

## 2023-03-10 PROCEDURE — 77336 RADIATION PHYSICS CONSULT: CPT | Performed by: RADIOLOGY

## 2023-03-10 PROCEDURE — 77386 HC NTSTY MODUL RAD TX DLVR CPLX: CPT | Performed by: RADIOLOGY

## 2023-03-13 ENCOUNTER — APPOINTMENT (OUTPATIENT)
Dept: RADIATION ONCOLOGY | Age: 68
End: 2023-03-13
Payer: OTHER GOVERNMENT

## 2023-03-13 PROCEDURE — 77386 HC NTSTY MODUL RAD TX DLVR CPLX: CPT | Performed by: RADIOLOGY

## 2023-03-13 PROCEDURE — 77014 CHG CT GUIDANCE RADIATION THERAPY FLDS PLACEMENT: CPT | Performed by: RADIOLOGY

## 2023-03-14 ENCOUNTER — APPOINTMENT (OUTPATIENT)
Dept: RADIATION ONCOLOGY | Age: 68
End: 2023-03-14
Payer: OTHER GOVERNMENT

## 2023-03-14 VITALS
OXYGEN SATURATION: 95 % | HEART RATE: 88 BPM | BODY MASS INDEX: 31.95 KG/M2 | WEIGHT: 242.2 LBS | RESPIRATION RATE: 18 BRPM | TEMPERATURE: 98.6 F

## 2023-03-14 DIAGNOSIS — C80.1 CANCER (HCC): Primary | ICD-10-CM

## 2023-03-14 PROCEDURE — 77386 HC NTSTY MODUL RAD TX DLVR CPLX: CPT | Performed by: RADIOLOGY

## 2023-03-14 PROCEDURE — 77014 CHG CT GUIDANCE RADIATION THERAPY FLDS PLACEMENT: CPT | Performed by: RADIOLOGY

## 2023-03-14 PROCEDURE — 99999 PR OFFICE/OUTPT VISIT,PROCEDURE ONLY: CPT | Performed by: RADIOLOGY

## 2023-03-14 NOTE — PROGRESS NOTES
DEPARTMENT OF RADIATION ONCOLOGY ON TREATMENT VISIT         3/14/2023      NAME:  Gabi Vaughan    YOB: 1955    Diagnosis: lung cancer    SUBJECTIVE:   Gabi Vaughan has now received fractionated external beam radiation therapy - ongoing. Past medical, surgical, social and family histories reviewed and updated as indicated. Pain: controlled    ALLERGIES:  Patient has no known allergies. Current Outpatient Medications   Medication Sig Dispense Refill    lidocaine-prilocaine (EMLA) 2.5-2.5 % cream Apply topically as needed. 5 g 1    ibuprofen (ADVIL;MOTRIN) 800 MG tablet Take 1 tablet by mouth every 6 hours as needed for Pain 20 tablet 0    prochlorperazine (COMPAZINE) 10 MG tablet Take 1 tablet by mouth every 6 hours as needed (for nausea/vomiting) 40 tablet 3    tiotropium (SPIRIVA RESPIMAT) 2.5 MCG/ACT AERS inhaler Inhale 2 puffs into the lungs daily      varenicline (CHANTIX) 0.5 MG tablet Take 0.5 mg by mouth daily Follow dosing quidelines      varenicline (CHANTIX) 1 MG tablet Take 1 mg by mouth in the morning and at bedtime After completing first dosing cycle. aspirin 81 MG EC tablet Take 1 tablet by mouth daily 30 tablet 3    lisinopril (PRINIVIL;ZESTRIL) 10 MG tablet Take 10 mg by mouth daily      Misc Natural Products (URINOZINC PO) Take by mouth daily      pantoprazole (PROTONIX) 40 MG tablet Take 40 mg by mouth daily      clopidogrel (PLAVIX) 75 MG tablet Take 1 tablet by mouth daily 30 tablet 0    albuterol sulfate HFA (VENTOLIN HFA) 108 (90 Base) MCG/ACT inhaler Inhale 2 puffs into the lungs 4 times daily as needed for Wheezing 18 g 5    atorvastatin (LIPITOR) 40 MG tablet Take 1 tablet by mouth daily 30 tablet 3    amLODIPine (NORVASC) 10 MG tablet Take 10 mg by mouth daily       No current facility-administered medications for this encounter. OBJECTIVE:  Alert and fully ambulatory. Pleasant and conversant.       Physical Examination: General appearance - alert, well appearing, and in no distress. Wt Readings from Last 3 Encounters:   03/14/23 242 lb 3.2 oz (109.9 kg)   03/07/23 238 lb 9.6 oz (108.2 kg)   02/28/23 241 lb 3.2 oz (109.4 kg)         ASSESSMENT/PLAN:     Patient is tolerating treatments well with expected toxicities. RBA were reviewed prior to first fraction and PRN. Current and planned dose reviewed. Goals of treatment and potential side effects were reviewed with the patient PRN. Treatment imaging has been personally reviewed for accuracy and precision. Questions answered to apparent satisfaction. Treatments will continue as planned. Kendall Hernandez.  Cristine Armenta MD MS DABR  Radiation Oncologist        [facility] ([address] [place] [address]): [phone] /// FAX: [phone]  [facility]): [phone] /// FAX: [phone]  [address] [place] [address]): [phone] /// FAX: [phone]

## 2023-03-14 NOTE — PATIENT INSTRUCTIONS
Continue daily fractionated radiation therapy as scheduled. Please see weekly OTV note and intial consultation letter in [facility] for clinical details. Lavonne Phan. Isa Bunch MD MS [address]:  [phone]   FAX: [phone]  [facility]:  [phone]   FAX:    [phone]  [address] [place] [address] Road:  [phone]   FAX:  [phone]  Email: [email or web address]

## 2023-03-14 NOTE — PROGRESS NOTES
Yamilet Rocket Lawyer  3/14/2023  Wt Readings from Last 3 Encounters:   23 238 lb 9.6 oz (108.2 kg)   23 241 lb 3.2 oz (109.4 kg)   23 238 lb 9.6 oz (108.2 kg)     There is no height or weight on file to calculate BMI. Treatment Area:ILENE mediastinum    Patient was seen today for weekly visit. Comfort Alteration  KPS:80%  Fatigue: None    Ventilation Alterations  Cough: Yes  Hemoptysis: No  Mucus Color: none  Dyspnea: No  O2 Sat: 95%    Nutritional Alteration  Anorexia: No  Nausea: No   Vomiting: No     Skin Alteration   Sensation:good    Radiation Dermatitis:  no    Mucous Membrane Alteration  Voice Changes/ Stridor/Larynx: no  Pharynx & Esophagus: normal    Elimination Alterations  Constipation: no  Diarrhea:  no      Emotional  Coping: effective      Injury, potential bleeding or infection: no    Other:na    Lab Results   Component Value Date    WBC 7.9 2023     (H) 2023         There were no vitals taken for this visit. BP within normal range? no   -if no, manually recheck in 5-10 min  NEW BP readin/96  BP within normal range? no   -if no, notify attending provider for further instruction. Patient states he saw his PCP, Dr. Ben Oh yesterday who is monitoring patient's BP. Dr. Ben Oh seeing patient back in 2 weeks to evaluate if BP meds need  per patient. Patient denies any headaches, chest pains, SOB, or nosebleeds. Encouraged patient to call PCP, go to an Urgent Care or ED if begins to feel symptomatic. Assessment/Plan:fx;2400/6600cGy completed and tolerating.     Ally Smith RN

## 2023-03-15 ENCOUNTER — TELEPHONE (OUTPATIENT)
Dept: RADIATION ONCOLOGY | Age: 68
End: 2023-03-15

## 2023-03-15 ENCOUNTER — APPOINTMENT (OUTPATIENT)
Dept: RADIATION ONCOLOGY | Age: 68
End: 2023-03-15
Payer: OTHER GOVERNMENT

## 2023-03-15 PROCEDURE — 77386 HC NTSTY MODUL RAD TX DLVR CPLX: CPT | Performed by: RADIOLOGY

## 2023-03-15 PROCEDURE — 77014 CHG CT GUIDANCE RADIATION THERAPY FLDS PLACEMENT: CPT | Performed by: RADIOLOGY

## 2023-03-15 NOTE — TELEPHONE ENCOUNTER
SW briefly followed up with pt prior to his radiation treatment. Pt denied any needs at this time. Pt was encouraged to reach out to this provider should any needs arise.        Jennifer MIRAMONTES, ALESHIA-S  Oncology Social Worker

## 2023-03-16 ENCOUNTER — APPOINTMENT (OUTPATIENT)
Dept: RADIATION ONCOLOGY | Age: 68
End: 2023-03-16
Payer: OTHER GOVERNMENT

## 2023-03-16 PROCEDURE — 77386 HC NTSTY MODUL RAD TX DLVR CPLX: CPT | Performed by: RADIOLOGY

## 2023-03-16 PROCEDURE — 77014 CHG CT GUIDANCE RADIATION THERAPY FLDS PLACEMENT: CPT | Performed by: RADIOLOGY

## 2023-03-17 ENCOUNTER — APPOINTMENT (OUTPATIENT)
Dept: RADIATION ONCOLOGY | Age: 68
End: 2023-03-17
Payer: OTHER GOVERNMENT

## 2023-03-17 PROCEDURE — 77386 HC NTSTY MODUL RAD TX DLVR CPLX: CPT | Performed by: RADIOLOGY

## 2023-03-17 PROCEDURE — 77336 RADIATION PHYSICS CONSULT: CPT | Performed by: RADIOLOGY

## 2023-03-20 ENCOUNTER — APPOINTMENT (OUTPATIENT)
Dept: RADIATION ONCOLOGY | Age: 68
End: 2023-03-20
Payer: OTHER GOVERNMENT

## 2023-03-20 PROCEDURE — 77386 HC NTSTY MODUL RAD TX DLVR CPLX: CPT | Performed by: RADIOLOGY

## 2023-03-20 PROCEDURE — 77014 CHG CT GUIDANCE RADIATION THERAPY FLDS PLACEMENT: CPT | Performed by: RADIOLOGY

## 2023-03-21 ENCOUNTER — APPOINTMENT (OUTPATIENT)
Dept: RADIATION ONCOLOGY | Age: 68
End: 2023-03-21
Payer: OTHER GOVERNMENT

## 2023-03-21 PROCEDURE — 77014 CHG CT GUIDANCE RADIATION THERAPY FLDS PLACEMENT: CPT | Performed by: RADIOLOGY

## 2023-03-21 PROCEDURE — 77386 HC NTSTY MODUL RAD TX DLVR CPLX: CPT | Performed by: RADIOLOGY

## 2023-03-22 ENCOUNTER — APPOINTMENT (OUTPATIENT)
Dept: RADIATION ONCOLOGY | Age: 68
End: 2023-03-22
Payer: OTHER GOVERNMENT

## 2023-03-22 PROCEDURE — 77386 HC NTSTY MODUL RAD TX DLVR CPLX: CPT | Performed by: RADIOLOGY

## 2023-03-23 ENCOUNTER — HOSPITAL ENCOUNTER (OUTPATIENT)
Dept: INFUSION THERAPY | Age: 68
Discharge: HOME | End: 2023-03-23
Payer: MEDICARE

## 2023-03-23 ENCOUNTER — HOSPITAL ENCOUNTER (OUTPATIENT)
Dept: RADIATION ONCOLOGY | Age: 68
Discharge: HOME | End: 2023-03-23
Payer: OTHER GOVERNMENT

## 2023-03-23 ENCOUNTER — OFFICE VISIT (OUTPATIENT)
Dept: ONCOLOGY | Age: 68
End: 2023-03-23
Payer: MEDICARE

## 2023-03-23 ENCOUNTER — APPOINTMENT (OUTPATIENT)
Dept: RADIATION ONCOLOGY | Age: 68
End: 2023-03-23
Payer: OTHER GOVERNMENT

## 2023-03-23 ENCOUNTER — TELEPHONE (OUTPATIENT)
Dept: RADIATION ONCOLOGY | Age: 68
End: 2023-03-23

## 2023-03-23 VITALS
SYSTOLIC BLOOD PRESSURE: 152 MMHG | HEIGHT: 73 IN | OXYGEN SATURATION: 96 % | BODY MASS INDEX: 32.23 KG/M2 | HEART RATE: 95 BPM | DIASTOLIC BLOOD PRESSURE: 80 MMHG | WEIGHT: 243.2 LBS | TEMPERATURE: 99.1 F

## 2023-03-23 VITALS
DIASTOLIC BLOOD PRESSURE: 92 MMHG | RESPIRATION RATE: 18 BRPM | BODY MASS INDEX: 32.06 KG/M2 | TEMPERATURE: 98.4 F | WEIGHT: 243 LBS | HEART RATE: 91 BPM | OXYGEN SATURATION: 98 % | SYSTOLIC BLOOD PRESSURE: 173 MMHG

## 2023-03-23 DIAGNOSIS — C80.1 MALIGNANT SMALL CELL CANCER (HCC): Primary | ICD-10-CM

## 2023-03-23 DIAGNOSIS — C34.12 SMALL CELL LUNG CANCER, LEFT UPPER LOBE (HCC): ICD-10-CM

## 2023-03-23 DIAGNOSIS — C80.1 CANCER (HCC): Primary | ICD-10-CM

## 2023-03-23 DIAGNOSIS — C80.1 MALIGNANT SMALL CELL CANCER (HCC): ICD-10-CM

## 2023-03-23 LAB
ALBUMIN SERPL-MCNC: 4.2 G/DL (ref 3.5–5.2)
ALP SERPL-CCNC: 79 U/L (ref 40–129)
ALT SERPL-CCNC: 15 U/L (ref 0–40)
ANION GAP SERPL CALCULATED.3IONS-SCNC: 7 MMOL/L (ref 7–16)
AST SERPL-CCNC: 21 U/L (ref 0–39)
BASOPHILS # BLD: 0.05 E9/L (ref 0–0.2)
BASOPHILS NFR BLD: 1 % (ref 0–2)
BILIRUB SERPL-MCNC: 0.4 MG/DL (ref 0–1.2)
BUN SERPL-MCNC: 11 MG/DL (ref 6–23)
CALCIUM SERPL-MCNC: 9.4 MG/DL (ref 8.6–10.2)
CHLORIDE SERPL-SCNC: 101 MMOL/L (ref 98–107)
CO2 SERPL-SCNC: 29 MMOL/L (ref 22–29)
CREAT SERPL-MCNC: 0.9 MG/DL (ref 0.7–1.2)
EOSINOPHIL # BLD: 0.1 E9/L (ref 0.05–0.5)
EOSINOPHIL NFR BLD: 1.9 % (ref 0–6)
ERYTHROCYTE [DISTWIDTH] IN BLOOD BY AUTOMATED COUNT: 18.6 FL (ref 11.5–15)
GLUCOSE SERPL-MCNC: 166 MG/DL (ref 74–99)
HCT VFR BLD AUTO: 40.6 % (ref 37–54)
HGB BLD-MCNC: 13.3 G/DL (ref 12.5–16.5)
IMM GRANULOCYTES # BLD: 0.07 E9/L
IMM GRANULOCYTES NFR BLD: 1.3 % (ref 0–5)
LYMPHOCYTES # BLD: 1.3 E9/L (ref 1.5–4)
LYMPHOCYTES NFR BLD: 25 % (ref 20–42)
MAGNESIUM SERPL-MCNC: 2 MG/DL (ref 1.6–2.6)
MCH RBC QN AUTO: 29.4 PG (ref 26–35)
MCHC RBC AUTO-ENTMCNC: 32.8 % (ref 32–34.5)
MCV RBC AUTO: 89.6 FL (ref 80–99.9)
MONOCYTES # BLD: 0.85 E9/L (ref 0.1–0.95)
MONOCYTES NFR BLD: 16.3 % (ref 2–12)
NEUTROPHILS # BLD: 2.83 E9/L (ref 1.8–7.3)
NEUTS SEG NFR BLD: 54.5 % (ref 43–80)
PHOSPHATE SERPL-MCNC: 2.6 MG/DL (ref 2.5–4.5)
PLATELET # BLD AUTO: 405 E9/L (ref 130–450)
PMV BLD AUTO: 7.8 FL (ref 7–12)
POTASSIUM SERPL-SCNC: 4.2 MMOL/L (ref 3.5–5)
PROT SERPL-MCNC: 7.3 G/DL (ref 6.4–8.3)
RBC # BLD AUTO: 4.53 E12/L (ref 3.8–5.8)
SODIUM SERPL-SCNC: 137 MMOL/L (ref 132–146)
WBC # BLD: 5.2 E9/L (ref 4.5–11.5)

## 2023-03-23 PROCEDURE — 85025 COMPLETE CBC W/AUTO DIFF WBC: CPT

## 2023-03-23 PROCEDURE — 99212 OFFICE O/P EST SF 10 MIN: CPT

## 2023-03-23 PROCEDURE — 83735 ASSAY OF MAGNESIUM: CPT

## 2023-03-23 PROCEDURE — 77386 HC NTSTY MODUL RAD TX DLVR CPLX: CPT | Performed by: RADIOLOGY

## 2023-03-23 PROCEDURE — 84100 ASSAY OF PHOSPHORUS: CPT

## 2023-03-23 PROCEDURE — 36415 COLL VENOUS BLD VENIPUNCTURE: CPT

## 2023-03-23 PROCEDURE — 80053 COMPREHEN METABOLIC PANEL: CPT

## 2023-03-23 RX ORDER — SODIUM CHLORIDE 9 MG/ML
5-250 INJECTION, SOLUTION INTRAVENOUS PRN
OUTPATIENT
Start: 2023-03-28

## 2023-03-23 RX ORDER — DEXAMETHASONE SODIUM PHOSPHATE 10 MG/ML
8 INJECTION, SOLUTION INTRAMUSCULAR; INTRAVENOUS ONCE
OUTPATIENT
Start: 2023-03-28 | End: 2023-03-28

## 2023-03-23 RX ORDER — SODIUM CHLORIDE 9 MG/ML
5-250 INJECTION, SOLUTION INTRAVENOUS PRN
OUTPATIENT
Start: 2023-03-27

## 2023-03-23 RX ORDER — SODIUM CHLORIDE 9 MG/ML
5-40 INJECTION INTRAVENOUS PRN
OUTPATIENT
Start: 2023-03-27

## 2023-03-23 RX ORDER — HEPARIN SODIUM (PORCINE) LOCK FLUSH IV SOLN 100 UNIT/ML 100 UNIT/ML
500 SOLUTION INTRAVENOUS PRN
OUTPATIENT
Start: 2023-03-29

## 2023-03-23 RX ORDER — MEPERIDINE HYDROCHLORIDE 25 MG/ML
12.5 INJECTION INTRAMUSCULAR; INTRAVENOUS; SUBCUTANEOUS PRN
OUTPATIENT
Start: 2023-03-27

## 2023-03-23 RX ORDER — SODIUM CHLORIDE 9 MG/ML
5-40 INJECTION INTRAVENOUS PRN
OUTPATIENT
Start: 2023-03-28

## 2023-03-23 RX ORDER — ALBUTEROL SULFATE 90 UG/1
4 AEROSOL, METERED RESPIRATORY (INHALATION) PRN
OUTPATIENT
Start: 2023-03-28

## 2023-03-23 RX ORDER — ALBUTEROL SULFATE 90 UG/1
4 AEROSOL, METERED RESPIRATORY (INHALATION) PRN
OUTPATIENT
Start: 2023-03-27

## 2023-03-23 RX ORDER — SODIUM CHLORIDE 9 MG/ML
5-40 INJECTION INTRAVENOUS PRN
OUTPATIENT
Start: 2023-03-29

## 2023-03-23 RX ORDER — DIPHENHYDRAMINE HYDROCHLORIDE 50 MG/ML
50 INJECTION INTRAMUSCULAR; INTRAVENOUS
OUTPATIENT
Start: 2023-03-29

## 2023-03-23 RX ORDER — DIPHENHYDRAMINE HYDROCHLORIDE 50 MG/ML
50 INJECTION INTRAMUSCULAR; INTRAVENOUS
OUTPATIENT
Start: 2023-03-28

## 2023-03-23 RX ORDER — HEPARIN SODIUM (PORCINE) LOCK FLUSH IV SOLN 100 UNIT/ML 100 UNIT/ML
500 SOLUTION INTRAVENOUS PRN
OUTPATIENT
Start: 2023-03-28

## 2023-03-23 RX ORDER — ACETAMINOPHEN 325 MG/1
650 TABLET ORAL
OUTPATIENT
Start: 2023-03-29

## 2023-03-23 RX ORDER — DIPHENHYDRAMINE HYDROCHLORIDE 50 MG/ML
50 INJECTION INTRAMUSCULAR; INTRAVENOUS
OUTPATIENT
Start: 2023-03-27

## 2023-03-23 RX ORDER — PALONOSETRON HYDROCHLORIDE 0.05 MG/ML
0.25 INJECTION, SOLUTION INTRAVENOUS ONCE
OUTPATIENT
Start: 2023-03-27 | End: 2023-03-27

## 2023-03-23 RX ORDER — ALBUTEROL SULFATE 90 UG/1
4 AEROSOL, METERED RESPIRATORY (INHALATION) PRN
OUTPATIENT
Start: 2023-03-29

## 2023-03-23 RX ORDER — EPINEPHRINE 1 MG/ML
0.3 INJECTION, SOLUTION, CONCENTRATE INTRAVENOUS PRN
OUTPATIENT
Start: 2023-03-29

## 2023-03-23 RX ORDER — EPINEPHRINE 1 MG/ML
0.3 INJECTION, SOLUTION, CONCENTRATE INTRAVENOUS PRN
OUTPATIENT
Start: 2023-03-28

## 2023-03-23 RX ORDER — ONDANSETRON 2 MG/ML
8 INJECTION INTRAMUSCULAR; INTRAVENOUS
OUTPATIENT
Start: 2023-03-29

## 2023-03-23 RX ORDER — SODIUM CHLORIDE 9 MG/ML
INJECTION, SOLUTION INTRAVENOUS CONTINUOUS
OUTPATIENT
Start: 2023-03-29

## 2023-03-23 RX ORDER — MEPERIDINE HYDROCHLORIDE 25 MG/ML
12.5 INJECTION INTRAMUSCULAR; INTRAVENOUS; SUBCUTANEOUS PRN
OUTPATIENT
Start: 2023-03-28

## 2023-03-23 RX ORDER — SODIUM CHLORIDE 9 MG/ML
5-250 INJECTION, SOLUTION INTRAVENOUS PRN
OUTPATIENT
Start: 2023-03-29

## 2023-03-23 RX ORDER — SODIUM CHLORIDE 0.9 % (FLUSH) 0.9 %
5-40 SYRINGE (ML) INJECTION PRN
OUTPATIENT
Start: 2023-03-23

## 2023-03-23 RX ORDER — ONDANSETRON 2 MG/ML
8 INJECTION INTRAMUSCULAR; INTRAVENOUS
OUTPATIENT
Start: 2023-03-27

## 2023-03-23 RX ORDER — HEPARIN SODIUM (PORCINE) LOCK FLUSH IV SOLN 100 UNIT/ML 100 UNIT/ML
500 SOLUTION INTRAVENOUS PRN
OUTPATIENT
Start: 2023-03-23

## 2023-03-23 RX ORDER — DEXAMETHASONE SODIUM PHOSPHATE 10 MG/ML
8 INJECTION, SOLUTION INTRAMUSCULAR; INTRAVENOUS ONCE
OUTPATIENT
Start: 2023-03-29 | End: 2023-03-29

## 2023-03-23 RX ORDER — ONDANSETRON 2 MG/ML
8 INJECTION INTRAMUSCULAR; INTRAVENOUS
OUTPATIENT
Start: 2023-03-28

## 2023-03-23 RX ORDER — SODIUM CHLORIDE 9 MG/ML
INJECTION, SOLUTION INTRAVENOUS CONTINUOUS
OUTPATIENT
Start: 2023-03-28

## 2023-03-23 RX ORDER — SODIUM CHLORIDE 9 MG/ML
25 INJECTION, SOLUTION INTRAVENOUS PRN
OUTPATIENT
Start: 2023-03-23

## 2023-03-23 RX ORDER — ACETAMINOPHEN 325 MG/1
650 TABLET ORAL
OUTPATIENT
Start: 2023-03-27

## 2023-03-23 RX ORDER — HEPARIN SODIUM (PORCINE) LOCK FLUSH IV SOLN 100 UNIT/ML 100 UNIT/ML
500 SOLUTION INTRAVENOUS PRN
OUTPATIENT
Start: 2023-03-27

## 2023-03-23 RX ORDER — DEXAMETHASONE SODIUM PHOSPHATE 10 MG/ML
10 INJECTION, SOLUTION INTRAMUSCULAR; INTRAVENOUS ONCE
OUTPATIENT
Start: 2023-03-27 | End: 2023-03-27

## 2023-03-23 RX ORDER — EPINEPHRINE 1 MG/ML
0.3 INJECTION, SOLUTION, CONCENTRATE INTRAVENOUS PRN
OUTPATIENT
Start: 2023-03-27

## 2023-03-23 RX ORDER — SODIUM CHLORIDE 9 MG/ML
INJECTION, SOLUTION INTRAVENOUS CONTINUOUS
OUTPATIENT
Start: 2023-03-27

## 2023-03-23 RX ORDER — ACETAMINOPHEN 325 MG/1
650 TABLET ORAL
OUTPATIENT
Start: 2023-03-28

## 2023-03-23 RX ORDER — MEPERIDINE HYDROCHLORIDE 25 MG/ML
12.5 INJECTION INTRAMUSCULAR; INTRAVENOUS; SUBCUTANEOUS PRN
OUTPATIENT
Start: 2023-03-29

## 2023-03-23 NOTE — PROGRESS NOTES
Provider, MD   varenicline (CHANTIX) 1 MG tablet Take 1 mg by mouth in the morning and at bedtime After completing first dosing cycle. 12/30/22  Yes Historical Provider, MD   aspirin 81 MG EC tablet Take 1 tablet by mouth daily 11/11/22  Yes BRITTNEY Lara - CNP   lisinopril (PRINIVIL;ZESTRIL) 10 MG tablet Take 10 mg by mouth daily   Yes Historical Provider, MD   Misc Natural Products (URINOZINC PO) Take by mouth daily   Yes Historical Provider, MD   pantoprazole (PROTONIX) 40 MG tablet Take 40 mg by mouth daily   Yes Historical Provider, MD   clopidogrel (PLAVIX) 75 MG tablet Take 1 tablet by mouth daily 10/21/22  Yes James Pascual MD   albuterol sulfate HFA (VENTOLIN HFA) 108 (90 Base) MCG/ACT inhaler Inhale 2 puffs into the lungs 4 times daily as needed for Wheezing 8/31/22  Yes Lamar Jaramillo MD   atorvastatin (LIPITOR) 40 MG tablet Take 1 tablet by mouth daily 9/1/22  Yes Grecia Gusman MD   amLODIPine (NORVASC) 10 MG tablet Take 10 mg by mouth daily   Yes Historical Provider, MD       Allergies  No Known Allergies    Review of Systems:      Objective  BP (!) 152/80   Pulse 95   Temp 99.1 °F (37.3 °C)   Ht 6' 1\" (1.854 m)   Wt 243 lb 3.2 oz (110.3 kg)   SpO2 96%   BMI 32.09 kg/m²     Physical Performance Status    Physical Exam:  General: AAO to person, place, time, and purpose, appears stated age, cooperative, no acute distress, pleasant   Head and neck : PERRLA, EOMI . Sclera non icteric. Oropharynx : Clear  Neck: no JVD,  no adenopathy, no carotid bruit  LYMPHATICS : No peripheral lymphadenopathy. Lungs: Clear to auscultation   Heart: Regular rate and regular rhythm, no murmur, normal S1, S2  Abdomen: Soft, non-tender;no masses, no organomegaly  Extremities: No edema,no cyanosis, no clubbing. Skin:  No rash. Neurologic:Cranial nerves grossly intact. No focal motor or sensory deficits .     Recent Laboratory Data-   Lab Results   Component Value Date    WBC 5.2 03/23/2023    HGB 13.3

## 2023-03-23 NOTE — PROGRESS NOTES
Jose Alfredo Mccall [facility]  3/23/2023  9:08 AM      No chief complaint on file. Wt Readings from Last 3 Encounters:   03/23/23 243 lb (110.2 kg)   03/14/23 242 lb 3.2 oz (109.9 kg)   03/07/23 238 lb 9.6 oz (108.2 kg)       Comments: Dose 3800 cGy to the left upper lobe of the lung. Patient is doing well without any complaints. He denies any dysphagia. He has no shortness of breath. He has occasional cough. He has no hemoptysis. She denies headache nausea or vomiting. On examination the skin over the treated area looks good.     Patient is tolerating treatment well      Plan: Radiation to continue as planned      Electronically signed by Caryl Whaley MD on 3/23/23 at 9:08 AM EDT

## 2023-03-23 NOTE — TELEPHONE ENCOUNTER
Adrienne Fregoso  3/23/2023  Wt Readings from Last 10 Encounters:   03/23/23 243 lb 3.2 oz (110.3 kg)   03/23/23 243 lb (110.2 kg)   03/14/23 242 lb 3.2 oz (109.9 kg)   03/07/23 238 lb 9.6 oz (108.2 kg)   02/28/23 241 lb 3.2 oz (109.4 kg)   02/23/23 238 lb 9.6 oz (108.2 kg)   02/20/23 229 lb (103.9 kg)   02/09/23 239 lb 11.2 oz (108.7 kg)   01/27/23 246 lb 12.8 oz (111.9 kg)   01/26/23 244 lb 4.8 oz (110.8 kg)     Ht Readings from Last 1 Encounters:   03/23/23 6' 1\" (1.854 m)     BMI=32.09    Assessment: Met with Keren Joiner while in for radiation today. He is receiving concurrent chemo/rad tx for lung cancer. He reports he feels great and continues to have a good appetite, finishing 1 meal and looking for next thing to eat. He has quite smoking and this may be reason for his increased intake. He denies N/V/D/C, taste changes, mouth sore, dysphagia and odynophagia. He continues to drinking multiple cups of caffinated coffee throughout the day. Again encouraged reduction in caffeine and replace with water. BP is high again today. He voiced understanding. Encouraged him to call with questions or concerns. Weight change: stable x 1 week, 6.11% significant weight gain x 1 month, stable compared to 3 months ago, 5.65% weight gain x 5 months  Appetite: Great, eating more than his usual  Nutritional Side Effects: None  Calculated Needs: 26-28kcal/kg/LTQ=1517-2812vezqd, 1.3-1.5gm/kg/ISY=749-482qv protein, 1ml/kcal=2900-3100ml fluids  Malnutrition Status: None    Recommendations: Eat 3 meals per day with 2-3 healthy snacks per day. Increase intake of water and decrease caffinated beverages.      Kaya Nolen RD

## 2023-03-23 NOTE — PROGRESS NOTES
Wendy Balderas [facility]  3/23/2023  Wt Readings from Last 3 Encounters:   23 243 lb (110.2 kg)   23 242 lb 3.2 oz (109.9 kg)   23 238 lb 9.6 oz (108.2 kg)     Body mass index is 32.06 kg/m². Treatment Area:ILENE Mdstnm & boost    Patient was seen today for weekly visit. Comfort Alteration  KPS:80%  Fatigue: None    Ventilation Alterations  Cough: Yes  Hemoptysis: No  Mucus Color: none  Dyspnea: No  O2 Sat: 98%    Nutritional Alteration  Anorexia: No  Nausea: No   Vomiting: No     Skin Alteration   Sensation:good    Radiation Dermatitis:  no    Mucous Membrane Alteration  Voice Changes/ Stridor/Larynx: no  Pharynx & Esophagus: wnl    Elimination Alterations  Constipation: no  Diarrhea:  no      Emotional  Coping: effective      Injury, potential bleeding or infection: no    Other:na    Lab Results   Component Value Date    WBC 5.2 2023     2023         BP (!) 173/92   Pulse 91   Temp 98.4 °F (36.9 °C) (Temporal)   Resp 18   Wt 243 lb (110.2 kg)   SpO2 98%   BMI 32.06 kg/m²   BP within normal range? no   -if no, manually recheck in 5-10 min  NEW BP readin/88  BP within normal range? no   -if no, notify attending provider for further instruction. Dr. Rachel Byrd seeing patient back in 2 weeks to evaluate if BP meds need  per patient. Patient denies any headaches, chest pains, SOB, or nosebleeds. Encouraged patient to call PCP, go to an Urgent Care or ED if begins to feel symptomatic. Assessment/Plan:fx;3800/6600cGy completed.     Diego Dai, [place]

## 2023-03-24 ENCOUNTER — HOSPITAL ENCOUNTER (OUTPATIENT)
Dept: RADIATION ONCOLOGY | Age: 68
Discharge: HOME | End: 2023-03-24
Payer: OTHER GOVERNMENT

## 2023-03-24 ENCOUNTER — APPOINTMENT (OUTPATIENT)
Dept: RADIATION ONCOLOGY | Age: 68
End: 2023-03-24
Payer: OTHER GOVERNMENT

## 2023-03-24 PROCEDURE — 77336 RADIATION PHYSICS CONSULT: CPT | Performed by: RADIOLOGY

## 2023-03-24 PROCEDURE — 77386 HC NTSTY MODUL RAD TX DLVR CPLX: CPT | Performed by: RADIOLOGY

## 2023-03-27 ENCOUNTER — APPOINTMENT (OUTPATIENT)
Dept: RADIATION ONCOLOGY | Age: 68
End: 2023-03-27
Payer: OTHER GOVERNMENT

## 2023-03-27 ENCOUNTER — HOSPITAL ENCOUNTER (OUTPATIENT)
Dept: INFUSION THERAPY | Age: 68
Discharge: HOME | End: 2023-03-27
Payer: OTHER GOVERNMENT

## 2023-03-27 VITALS
HEART RATE: 94 BPM | TEMPERATURE: 98.3 F | DIASTOLIC BLOOD PRESSURE: 79 MMHG | SYSTOLIC BLOOD PRESSURE: 159 MMHG | OXYGEN SATURATION: 95 % | RESPIRATION RATE: 16 BRPM

## 2023-03-27 DIAGNOSIS — C80.1 MALIGNANT SMALL CELL CANCER (HCC): Primary | ICD-10-CM

## 2023-03-27 PROCEDURE — 96367 TX/PROPH/DG ADDL SEQ IV INF: CPT

## 2023-03-27 PROCEDURE — 2580000003 HC RX 258: Performed by: INTERNAL MEDICINE

## 2023-03-27 PROCEDURE — 6360000002 HC RX W HCPCS

## 2023-03-27 PROCEDURE — 96417 CHEMO IV INFUS EACH ADDL SEQ: CPT

## 2023-03-27 PROCEDURE — 96415 CHEMO IV INFUSION ADDL HR: CPT

## 2023-03-27 PROCEDURE — 6360000002 HC RX W HCPCS: Performed by: INTERNAL MEDICINE

## 2023-03-27 PROCEDURE — 96375 TX/PRO/DX INJ NEW DRUG ADDON: CPT

## 2023-03-27 PROCEDURE — 96413 CHEMO IV INFUSION 1 HR: CPT

## 2023-03-27 RX ORDER — SODIUM CHLORIDE 9 MG/ML
5-250 INJECTION, SOLUTION INTRAVENOUS PRN
Status: DISCONTINUED | OUTPATIENT
Start: 2023-03-27 | End: 2023-03-28 | Stop reason: HOSPADM

## 2023-03-27 RX ORDER — PALONOSETRON HYDROCHLORIDE 0.05 MG/ML
INJECTION, SOLUTION INTRAVENOUS
Status: COMPLETED
Start: 2023-03-27 | End: 2023-03-27

## 2023-03-27 RX ORDER — PALONOSETRON HYDROCHLORIDE 0.05 MG/ML
0.25 INJECTION, SOLUTION INTRAVENOUS ONCE
Status: COMPLETED | OUTPATIENT
Start: 2023-03-27 | End: 2023-03-27

## 2023-03-27 RX ORDER — HEPARIN SODIUM (PORCINE) LOCK FLUSH IV SOLN 100 UNIT/ML 100 UNIT/ML
500 SOLUTION INTRAVENOUS PRN
Status: DISCONTINUED | OUTPATIENT
Start: 2023-03-27 | End: 2023-03-28 | Stop reason: HOSPADM

## 2023-03-27 RX ORDER — SODIUM CHLORIDE 9 MG/ML
5-40 INJECTION INTRAVENOUS PRN
Status: DISCONTINUED | OUTPATIENT
Start: 2023-03-27 | End: 2023-03-28 | Stop reason: HOSPADM

## 2023-03-27 RX ORDER — DEXAMETHASONE SODIUM PHOSPHATE 10 MG/ML
10 INJECTION INTRAMUSCULAR; INTRAVENOUS ONCE
Status: COMPLETED | OUTPATIENT
Start: 2023-03-27 | End: 2023-03-27

## 2023-03-27 RX ORDER — DEXAMETHASONE SODIUM PHOSPHATE 10 MG/ML
INJECTION INTRAMUSCULAR; INTRAVENOUS
Status: COMPLETED
Start: 2023-03-27 | End: 2023-03-27

## 2023-03-27 RX ADMIN — PALONOSETRON HYDROCHLORIDE 0.25 MG: 0.05 INJECTION, SOLUTION INTRAVENOUS at 09:33

## 2023-03-27 RX ADMIN — Medication 500 UNITS: at 14:10

## 2023-03-27 RX ADMIN — ETOPOSIDE 240 MG: 20 INJECTION INTRAVENOUS at 12:44

## 2023-03-27 RX ADMIN — FOSAPREPITANT 150 MG: 150 INJECTION, POWDER, LYOPHILIZED, FOR SOLUTION INTRAVENOUS at 09:45

## 2023-03-27 RX ADMIN — SODIUM CHLORIDE 20 ML/HR: 9 INJECTION, SOLUTION INTRAVENOUS at 09:32

## 2023-03-27 RX ADMIN — DEXAMETHASONE SODIUM PHOSPHATE 10 MG: 10 INJECTION INTRAMUSCULAR; INTRAVENOUS at 09:36

## 2023-03-27 RX ADMIN — PALONOSETRON 0.25 MG: 0.25 INJECTION, SOLUTION INTRAVENOUS at 09:33

## 2023-03-27 RX ADMIN — SODIUM CHLORIDE, PRESERVATIVE FREE 10 ML: 5 INJECTION INTRAVENOUS at 09:37

## 2023-03-27 RX ADMIN — POTASSIUM CHLORIDE: 2 INJECTION, SOLUTION, CONCENTRATE INTRAVENOUS at 10:34

## 2023-03-28 ENCOUNTER — HOSPITAL ENCOUNTER (OUTPATIENT)
Dept: INFUSION THERAPY | Age: 68
Discharge: HOME | End: 2023-03-28
Payer: MEDICARE

## 2023-03-28 ENCOUNTER — APPOINTMENT (OUTPATIENT)
Dept: RADIATION ONCOLOGY | Age: 68
End: 2023-03-28
Payer: OTHER GOVERNMENT

## 2023-03-28 VITALS
RESPIRATION RATE: 20 BRPM | OXYGEN SATURATION: 97 % | SYSTOLIC BLOOD PRESSURE: 154 MMHG | HEART RATE: 92 BPM | DIASTOLIC BLOOD PRESSURE: 79 MMHG | TEMPERATURE: 99.2 F

## 2023-03-28 DIAGNOSIS — C80.1 MALIGNANT SMALL CELL CANCER (HCC): Primary | ICD-10-CM

## 2023-03-28 PROCEDURE — 77386 HC NTSTY MODUL RAD TX DLVR CPLX: CPT | Performed by: RADIOLOGY

## 2023-03-28 PROCEDURE — 2580000003 HC RX 258: Performed by: INTERNAL MEDICINE

## 2023-03-28 PROCEDURE — 96413 CHEMO IV INFUSION 1 HR: CPT

## 2023-03-28 PROCEDURE — 96375 TX/PRO/DX INJ NEW DRUG ADDON: CPT

## 2023-03-28 PROCEDURE — 6360000002 HC RX W HCPCS: Performed by: INTERNAL MEDICINE

## 2023-03-28 PROCEDURE — 77014 CHG CT GUIDANCE RADIATION THERAPY FLDS PLACEMENT: CPT | Performed by: RADIOLOGY

## 2023-03-28 RX ORDER — HEPARIN SODIUM (PORCINE) LOCK FLUSH IV SOLN 100 UNIT/ML 100 UNIT/ML
500 SOLUTION INTRAVENOUS PRN
Status: DISCONTINUED | OUTPATIENT
Start: 2023-03-28 | End: 2023-03-29 | Stop reason: HOSPADM

## 2023-03-28 RX ORDER — SODIUM CHLORIDE 9 MG/ML
5-40 INJECTION INTRAVENOUS PRN
Status: DISCONTINUED | OUTPATIENT
Start: 2023-03-28 | End: 2023-03-29 | Stop reason: HOSPADM

## 2023-03-28 RX ORDER — DEXAMETHASONE SODIUM PHOSPHATE 10 MG/ML
8 INJECTION INTRAMUSCULAR; INTRAVENOUS ONCE
Status: COMPLETED | OUTPATIENT
Start: 2023-03-28 | End: 2023-03-28

## 2023-03-28 RX ORDER — SODIUM CHLORIDE 9 MG/ML
5-250 INJECTION, SOLUTION INTRAVENOUS PRN
Status: DISCONTINUED | OUTPATIENT
Start: 2023-03-28 | End: 2023-03-29 | Stop reason: HOSPADM

## 2023-03-28 RX ADMIN — SODIUM CHLORIDE, PRESERVATIVE FREE 10 ML: 5 INJECTION INTRAVENOUS at 11:31

## 2023-03-28 RX ADMIN — SODIUM CHLORIDE 20 ML/HR: 9 INJECTION, SOLUTION INTRAVENOUS at 09:46

## 2023-03-28 RX ADMIN — Medication 500 UNITS: at 11:31

## 2023-03-28 RX ADMIN — ETOPOSIDE 240 MG: 20 INJECTION INTRAVENOUS at 10:14

## 2023-03-28 RX ADMIN — DEXAMETHASONE SODIUM PHOSPHATE 8 MG: 10 INJECTION INTRAMUSCULAR; INTRAVENOUS at 09:46

## 2023-03-29 ENCOUNTER — APPOINTMENT (OUTPATIENT)
Dept: RADIATION ONCOLOGY | Age: 68
End: 2023-03-29
Payer: OTHER GOVERNMENT

## 2023-03-29 ENCOUNTER — HOSPITAL ENCOUNTER (OUTPATIENT)
Dept: RADIATION ONCOLOGY | Age: 68
Discharge: HOME | End: 2023-03-29
Payer: OTHER GOVERNMENT

## 2023-03-29 ENCOUNTER — HOSPITAL ENCOUNTER (OUTPATIENT)
Dept: INFUSION THERAPY | Age: 68
Discharge: HOME | End: 2023-03-29
Payer: MEDICARE

## 2023-03-29 VITALS
OXYGEN SATURATION: 97 % | HEART RATE: 90 BPM | SYSTOLIC BLOOD PRESSURE: 164 MMHG | TEMPERATURE: 97.6 F | DIASTOLIC BLOOD PRESSURE: 83 MMHG | RESPIRATION RATE: 18 BRPM

## 2023-03-29 DIAGNOSIS — C80.1 MALIGNANT SMALL CELL CANCER (HCC): Primary | ICD-10-CM

## 2023-03-29 PROCEDURE — 77301 RADIOTHERAPY DOSE PLAN IMRT: CPT | Performed by: RADIOLOGY

## 2023-03-29 PROCEDURE — 77338 DESIGN MLC DEVICE FOR IMRT: CPT | Performed by: RADIOLOGY

## 2023-03-29 PROCEDURE — 96413 CHEMO IV INFUSION 1 HR: CPT

## 2023-03-29 PROCEDURE — 2580000003 HC RX 258: Performed by: INTERNAL MEDICINE

## 2023-03-29 PROCEDURE — 77386 HC NTSTY MODUL RAD TX DLVR CPLX: CPT | Performed by: RADIOLOGY

## 2023-03-29 PROCEDURE — 6360000002 HC RX W HCPCS: Performed by: INTERNAL MEDICINE

## 2023-03-29 PROCEDURE — 77300 RADIATION THERAPY DOSE PLAN: CPT | Performed by: RADIOLOGY

## 2023-03-29 PROCEDURE — 96375 TX/PRO/DX INJ NEW DRUG ADDON: CPT

## 2023-03-29 RX ORDER — HEPARIN SODIUM (PORCINE) LOCK FLUSH IV SOLN 100 UNIT/ML 100 UNIT/ML
500 SOLUTION INTRAVENOUS PRN
Status: DISCONTINUED | OUTPATIENT
Start: 2023-03-29 | End: 2023-03-30 | Stop reason: HOSPADM

## 2023-03-29 RX ORDER — DEXAMETHASONE SODIUM PHOSPHATE 10 MG/ML
8 INJECTION INTRAMUSCULAR; INTRAVENOUS ONCE
Status: COMPLETED | OUTPATIENT
Start: 2023-03-29 | End: 2023-03-29

## 2023-03-29 RX ORDER — SODIUM CHLORIDE 9 MG/ML
5-40 INJECTION INTRAVENOUS PRN
Status: DISCONTINUED | OUTPATIENT
Start: 2023-03-29 | End: 2023-03-30 | Stop reason: HOSPADM

## 2023-03-29 RX ORDER — SODIUM CHLORIDE 9 MG/ML
5-250 INJECTION, SOLUTION INTRAVENOUS PRN
Status: DISCONTINUED | OUTPATIENT
Start: 2023-03-29 | End: 2023-03-30 | Stop reason: HOSPADM

## 2023-03-29 RX ADMIN — DEXAMETHASONE SODIUM PHOSPHATE 8 MG: 10 INJECTION INTRAMUSCULAR; INTRAVENOUS at 09:03

## 2023-03-29 RX ADMIN — SODIUM CHLORIDE, PRESERVATIVE FREE 10 ML: 5 INJECTION INTRAVENOUS at 10:51

## 2023-03-29 RX ADMIN — SODIUM CHLORIDE 150 ML/HR: 9 INJECTION, SOLUTION INTRAVENOUS at 09:01

## 2023-03-29 RX ADMIN — Medication 500 UNITS: at 10:51

## 2023-03-29 RX ADMIN — ETOPOSIDE 240 MG: 20 INJECTION INTRAVENOUS at 09:36

## 2023-03-29 NOTE — PROGRESS NOTES
Patient tolerated treatment well without complications or complaints. Alert and oriented x3. Patient aware of potential side effects and has no questions regarding treatment. Vitals stable throughout treatment. Pain assessed, patient denies any new or worsening pain. Patient left ambulatory by self.

## 2023-03-30 ENCOUNTER — HOSPITAL ENCOUNTER (OUTPATIENT)
Dept: RADIATION ONCOLOGY | Age: 68
Discharge: HOME | End: 2023-03-30
Payer: OTHER GOVERNMENT

## 2023-03-30 ENCOUNTER — APPOINTMENT (OUTPATIENT)
Dept: RADIATION ONCOLOGY | Age: 68
End: 2023-03-30
Payer: OTHER GOVERNMENT

## 2023-03-30 VITALS
DIASTOLIC BLOOD PRESSURE: 84 MMHG | BODY MASS INDEX: 32.69 KG/M2 | RESPIRATION RATE: 18 BRPM | HEART RATE: 95 BPM | WEIGHT: 247.8 LBS | TEMPERATURE: 98.4 F | SYSTOLIC BLOOD PRESSURE: 176 MMHG | OXYGEN SATURATION: 95 %

## 2023-03-30 DIAGNOSIS — C34.90 MALIGNANT NEOPLASM OF LUNG, UNSPECIFIED LATERALITY, UNSPECIFIED PART OF LUNG (HCC): Primary | ICD-10-CM

## 2023-03-30 PROCEDURE — 77386 HC NTSTY MODUL RAD TX DLVR CPLX: CPT | Performed by: RADIOLOGY

## 2023-03-30 NOTE — PROGRESS NOTES
Jagdeepmodesta Juarez  3/30/2023  8:53 AM      No chief complaint on file. Wt Readings from Last 3 Encounters:   03/30/23 247 lb 12.8 oz (112.4 kg)   03/23/23 243 lb 3.2 oz (110.3 kg)   03/23/23 243 lb (110.2 kg)       Comments: Dose 4800 cGy to the upper lobe of the lung. Patient is doing well without any complaints today. He denies any cough or hemoptysis. He has no dysphagia. He has no headache nausea or vomiting. He has no shortness of breath. He denies any pain, weight loss or fatigue. His blood pressure has been running high and he is scheduled to see his family doctor today. On examination the skin over the treated area looks good.     Patient is tolerating treatment well      Plan: Radiation to continue as planned      Electronically signed by Estrada Lee MD on 3/30/23 at 8:53 AM EDT

## 2023-03-30 NOTE — PROGRESS NOTES
Lizeth Peak  3/30/2023  Wt Readings from Last 3 Encounters:   23 247 lb 12.8 oz (112.4 kg)   23 243 lb 3.2 oz (110.3 kg)   23 243 lb (110.2 kg)     Body mass index is 32.69 kg/m². Treatment Area:ILENE Mediastinum & Boost    Patient was seen today for weekly visit. Comfort Alteration  KPS:80%  Fatigue: None    Ventilation Alterations  Cough: Yes, at times  Hemoptysis: No  Mucus Color: none  Dyspnea: No  O2 Sat: 95%    Nutritional Alteration  Anorexia: No  Nausea: No   Vomiting: No     Skin Alteration   Sensation:good    Radiation Dermatitis:  no    Mucous Membrane Alteration  Voice Changes/ Stridor/Larynx: no  Pharynx & Esophagus: wnl    Elimination Alterations  Constipation: no  Diarrhea:  no      Emotional  Coping: effective      Injury, potential bleeding or infection: no    Other:na    Lab Results   Component Value Date    WBC 5.2 2023     2023         BP (!) 176/84   Pulse 95   Temp 98.4 °F (36.9 °C) (Temporal)   Resp 18   Wt 247 lb 12.8 oz (112.4 kg)   SpO2 95%   BMI 32.69 kg/m²   BP within normal range? no   -if no, manually recheck in 5-10 min  NEW BP readin/94  BP within normal range? no   -if no, notify attending provider for further instruction. Physician notified. Patient has appointment with his PCP, Dr. Mary Ellen Valverde today at the [place] for BP medication adjustment. Patient denies any headaches chest pains, SOB, or nosebleeds. Encouraged patient to call PCP, go to an Urgent Care or ED if begins to feel symptomatic. Assessment/Plan:24/33fx;4800/6600cGy completed and tolerating RT well.      Lisbeth Oro RN

## 2023-03-31 ENCOUNTER — TELEPHONE (OUTPATIENT)
Dept: RADIATION ONCOLOGY | Age: 68
End: 2023-03-31

## 2023-03-31 ENCOUNTER — APPOINTMENT (OUTPATIENT)
Dept: RADIATION ONCOLOGY | Age: 68
End: 2023-03-31
Payer: OTHER GOVERNMENT

## 2023-03-31 PROCEDURE — 77336 RADIATION PHYSICS CONSULT: CPT | Performed by: RADIOLOGY

## 2023-03-31 PROCEDURE — 77386 HC NTSTY MODUL RAD TX DLVR CPLX: CPT | Performed by: RADIOLOGY

## 2023-03-31 NOTE — TELEPHONE ENCOUNTER
SW briefly followed up with pt prior to his radiation treatment this morning. Pt denied any needs at this time. Pt was encouraged to reach out to this provider should any needs arise.       Juanita MIRAMONTES, ALESHIA-S  Oncology Social Worker

## 2023-04-03 ENCOUNTER — HOSPITAL ENCOUNTER (OUTPATIENT)
Dept: RADIATION ONCOLOGY | Age: 68
Discharge: HOME | End: 2023-04-03
Payer: OTHER GOVERNMENT

## 2023-04-03 PROCEDURE — 77014 CHG CT GUIDANCE RADIATION THERAPY FLDS PLACEMENT: CPT | Performed by: RADIOLOGY

## 2023-04-03 PROCEDURE — 77386 HC NTSTY MODUL RAD TX DLVR CPLX: CPT | Performed by: RADIOLOGY

## 2023-04-04 ENCOUNTER — HOSPITAL ENCOUNTER (OUTPATIENT)
Dept: RADIATION ONCOLOGY | Age: 68
Discharge: HOME | End: 2023-04-04
Payer: OTHER GOVERNMENT

## 2023-04-04 VITALS
OXYGEN SATURATION: 97 % | RESPIRATION RATE: 18 BRPM | TEMPERATURE: 98.4 F | HEART RATE: 86 BPM | WEIGHT: 244 LBS | SYSTOLIC BLOOD PRESSURE: 169 MMHG | BODY MASS INDEX: 32.19 KG/M2 | DIASTOLIC BLOOD PRESSURE: 103 MMHG

## 2023-04-04 DIAGNOSIS — C34.90 MALIGNANT NEOPLASM OF LUNG, UNSPECIFIED LATERALITY, UNSPECIFIED PART OF LUNG (HCC): Primary | ICD-10-CM

## 2023-04-04 PROCEDURE — 77014 CHG CT GUIDANCE RADIATION THERAPY FLDS PLACEMENT: CPT | Performed by: RADIOLOGY

## 2023-04-04 PROCEDURE — 99999 PR OFFICE/OUTPT VISIT,PROCEDURE ONLY: CPT | Performed by: RADIOLOGY

## 2023-04-04 PROCEDURE — 77386 HC NTSTY MODUL RAD TX DLVR CPLX: CPT | Performed by: RADIOLOGY

## 2023-04-04 NOTE — PATIENT INSTRUCTIONS
Continue daily fractionated radiation therapy as scheduled. Please see weekly OTV note and intial consultation letter in [facility] for clinical details. Sully Arnold. Guy Vang MD MS Ali Cushing:  [phone]   FAX: [phone]  [facility]:  [phone]   FAX:    [phone] [address]:  [phone]   FAX:  [phone]  Email: [email or web address]

## 2023-04-04 NOTE — PROGRESS NOTES
Vickie Garber [facility] [place]  2023  Wt Readings from Last 3 Encounters:   23 244 lb (110.7 kg)   23 247 lb 12.8 oz (112.4 kg)   23 243 lb 3.2 oz (110.3 kg)     Body mass index is 32.19 kg/m². Treatment Area:[facility]    Patient was seen today for weekly visit. Comfort Alteration  KPS:80%  Fatigue: None    Ventilation Alterations  Cough: No  Hemoptysis: No  Mucus Color: none  Dyspnea: No  O2 Sat: 97%    Nutritional Alteration  Anorexia: No  Nausea: No   Vomiting: No     Skin Alteration   Sensation:good    Radiation Dermatitis:  no    Mucous Membrane Alteration  Voice Changes/ Stridor/Larynx: no  Pharynx & Esophagus: none    Elimination Alterations  Constipation: no  Diarrhea:  no      Emotional  Coping: effective      Injury, potential bleeding or infection: no    Other:na    Lab Results   Component Value Date    WBC 5.2 2023     2023         BP (!) 169/103   Pulse 86   Temp 98.4 °F (36.9 °C) (Temporal)   Resp 18   Wt 244 lb (110.7 kg)   SpO2 97%   BMI 32.19 kg/m²   BP within normal range? no   -if no, manually recheck in 5-10 min  NEW BP readin/69  BP within normal range? no   -if no, notify attending provider for further instruction. Patient recently had his BP medication adjusted and states he goes back to his PCP on 23 for follow up of the medication change. Assessment/Plan:27/33fx;5400/6600cGy completed and tolerating RT well with no complaints.     Aneudy Qureshi RN
appearance - alert, well appearing, and in no distress. Wt Readings from Last 3 Encounters:   04/04/23 244 lb (110.7 kg)   03/30/23 247 lb 12.8 oz (112.4 kg)   03/23/23 243 lb 3.2 oz (110.3 kg)         ASSESSMENT/PLAN:     Patient is tolerating treatments well with expected toxicities. RBA were reviewed prior to first fraction and PRN. Current and planned dose reviewed. Goals of treatment and potential side effects were reviewed with the patient PRN. Treatment imaging has been personally reviewed for accuracy and precision. Questions answered to apparent satisfaction. Treatments will continue as planned. Josseline Grant.  Karen Sandy MD MS DABR  Radiation Oncologist        [facility] ([address] [place] [address]): [phone] /// FAX: [phone]  [facility]): [phone] /// FAX: [phone]  [address] Marylene Soja): [phone] /// FAX: [phone]

## 2023-04-05 ENCOUNTER — HOSPITAL ENCOUNTER (OUTPATIENT)
Dept: RADIATION ONCOLOGY | Age: 68
Discharge: HOME | End: 2023-04-05
Payer: OTHER GOVERNMENT

## 2023-04-05 PROCEDURE — 77386 HC NTSTY MODUL RAD TX DLVR CPLX: CPT | Performed by: RADIOLOGY

## 2023-04-06 ENCOUNTER — HOSPITAL ENCOUNTER (OUTPATIENT)
Dept: RADIATION ONCOLOGY | Age: 68
Discharge: HOME | End: 2023-04-06
Payer: OTHER GOVERNMENT

## 2023-04-06 PROCEDURE — 77386 HC NTSTY MODUL RAD TX DLVR CPLX: CPT | Performed by: RADIOLOGY

## 2023-04-07 ENCOUNTER — TELEPHONE (OUTPATIENT)
Dept: CASE MANAGEMENT | Age: 68
End: 2023-04-07

## 2023-04-07 ENCOUNTER — HOSPITAL ENCOUNTER (OUTPATIENT)
Dept: RADIATION ONCOLOGY | Age: 68
Discharge: HOME | End: 2023-04-07
Payer: OTHER GOVERNMENT

## 2023-04-07 PROCEDURE — 77336 RADIATION PHYSICS CONSULT: CPT | Performed by: RADIOLOGY

## 2023-04-07 PROCEDURE — 77386 HC NTSTY MODUL RAD TX DLVR CPLX: CPT | Performed by: RADIOLOGY

## 2023-04-07 NOTE — TELEPHONE ENCOUNTER
Met with patient after radiation therapy, he's scheduled to finish his treatments on 04/12/2023. Overall he stated he feels well and is tolerating his treatments. Cycle 4 day 1 chemo is schedule for 04/24/2023. Patient denied issues with his appetite, transportation or with medical bills. He denied needs today and will update staff with questions or concerns. Fabby Narayanan  RN, ADN, BSE, OCN  Patient Nurse Navigator

## 2023-04-07 NOTE — TELEPHONE ENCOUNTER
Patient is scheduled to see Dr. Mandy Chacon on 04/20/2023 for chemo on 04/24/2023. Dr. Mandy Chacon won't be in the clinic on 04/20/2023. Contacted him at [facility] asking if he wanted patient seen by the nurse practitioner on 04/20 with chemo to be given on 04/24 or if he wants to see him himself on 04/27 with chemo moved out 1 week. He stated he'll see patient himself on 04/27/2023. Will ask  to contact patient with the new appointment. Riley Sagastume  RN, ADN, BSE, OCN  Patient Nurse Navigator

## 2023-04-20 ENCOUNTER — APPOINTMENT (OUTPATIENT)
Dept: INFUSION THERAPY | Age: 68
End: 2023-04-20
Payer: MEDICARE

## 2023-04-27 ENCOUNTER — OFFICE VISIT (OUTPATIENT)
Dept: ONCOLOGY | Age: 68
End: 2023-04-27
Payer: MEDICARE

## 2023-04-27 ENCOUNTER — HOSPITAL ENCOUNTER (OUTPATIENT)
Dept: INFUSION THERAPY | Age: 68
Discharge: HOME | End: 2023-04-27
Payer: MEDICARE

## 2023-04-27 VITALS
HEART RATE: 82 BPM | SYSTOLIC BLOOD PRESSURE: 172 MMHG | WEIGHT: 243.9 LBS | TEMPERATURE: 98 F | OXYGEN SATURATION: 98 % | DIASTOLIC BLOOD PRESSURE: 83 MMHG | BODY MASS INDEX: 32.32 KG/M2 | HEIGHT: 73 IN

## 2023-04-27 DIAGNOSIS — C80.1 MALIGNANT SMALL CELL CANCER (HCC): Primary | ICD-10-CM

## 2023-04-27 DIAGNOSIS — C80.1 MALIGNANT SMALL CELL CANCER (HCC): ICD-10-CM

## 2023-04-27 LAB
ALBUMIN SERPL-MCNC: 4.1 G/DL (ref 3.5–5.2)
ALP SERPL-CCNC: 72 U/L (ref 40–129)
ALT SERPL-CCNC: 20 U/L (ref 0–40)
ANION GAP SERPL CALCULATED.3IONS-SCNC: 11 MMOL/L (ref 7–16)
AST SERPL-CCNC: 30 U/L (ref 0–39)
BASOPHILS # BLD: 0.06 E9/L (ref 0–0.2)
BASOPHILS NFR BLD: 1.1 % (ref 0–2)
BILIRUB SERPL-MCNC: 0.3 MG/DL (ref 0–1.2)
BUN SERPL-MCNC: 9 MG/DL (ref 6–23)
CALCIUM SERPL-MCNC: 9.3 MG/DL (ref 8.6–10.2)
CHLORIDE SERPL-SCNC: 103 MMOL/L (ref 98–107)
CO2 SERPL-SCNC: 24 MMOL/L (ref 22–29)
CREAT SERPL-MCNC: 0.9 MG/DL (ref 0.7–1.2)
EOSINOPHIL # BLD: 0.04 E9/L (ref 0.05–0.5)
EOSINOPHIL NFR BLD: 0.7 % (ref 0–6)
ERYTHROCYTE [DISTWIDTH] IN BLOOD BY AUTOMATED COUNT: 19 FL (ref 11.5–15)
GLUCOSE SERPL-MCNC: 106 MG/DL (ref 74–99)
HCT VFR BLD AUTO: 37 % (ref 37–54)
HGB BLD-MCNC: 12.3 G/DL (ref 12.5–16.5)
IMM GRANULOCYTES # BLD: 0.08 E9/L
IMM GRANULOCYTES NFR BLD: 1.4 % (ref 0–5)
LYMPHOCYTES # BLD: 1.09 E9/L (ref 1.5–4)
LYMPHOCYTES NFR BLD: 19.2 % (ref 20–42)
MAGNESIUM SERPL-MCNC: 1.9 MG/DL (ref 1.6–2.6)
MCH RBC QN AUTO: 31.5 PG (ref 26–35)
MCHC RBC AUTO-ENTMCNC: 33.2 % (ref 32–34.5)
MCV RBC AUTO: 94.6 FL (ref 80–99.9)
MONOCYTES # BLD: 1.07 E9/L (ref 0.1–0.95)
MONOCYTES NFR BLD: 18.8 % (ref 2–12)
NEUTROPHILS # BLD: 3.34 E9/L (ref 1.8–7.3)
NEUTS SEG NFR BLD: 58.8 % (ref 43–80)
PHOSPHATE SERPL-MCNC: 3.3 MG/DL (ref 2.5–4.5)
PLATELET # BLD AUTO: 310 E9/L (ref 130–450)
PMV BLD AUTO: 7.8 FL (ref 7–12)
POTASSIUM SERPL-SCNC: 4.4 MMOL/L (ref 3.5–5)
PROT SERPL-MCNC: 7 G/DL (ref 6.4–8.3)
RBC # BLD AUTO: 3.91 E12/L (ref 3.8–5.8)
SODIUM SERPL-SCNC: 138 MMOL/L (ref 132–146)
WBC # BLD: 5.7 E9/L (ref 4.5–11.5)

## 2023-04-27 PROCEDURE — 83735 ASSAY OF MAGNESIUM: CPT

## 2023-04-27 PROCEDURE — 80053 COMPREHEN METABOLIC PANEL: CPT

## 2023-04-27 PROCEDURE — 99213 OFFICE O/P EST LOW 20 MIN: CPT

## 2023-04-27 PROCEDURE — 36415 COLL VENOUS BLD VENIPUNCTURE: CPT

## 2023-04-27 PROCEDURE — 84100 ASSAY OF PHOSPHORUS: CPT

## 2023-04-27 PROCEDURE — 85025 COMPLETE CBC W/AUTO DIFF WBC: CPT

## 2023-04-27 RX ORDER — DIPHENHYDRAMINE HYDROCHLORIDE 50 MG/ML
50 INJECTION INTRAMUSCULAR; INTRAVENOUS
OUTPATIENT
Start: 2023-05-01

## 2023-04-27 RX ORDER — ONDANSETRON 2 MG/ML
8 INJECTION INTRAMUSCULAR; INTRAVENOUS
OUTPATIENT
Start: 2023-05-01

## 2023-04-27 RX ORDER — HEPARIN SODIUM (PORCINE) LOCK FLUSH IV SOLN 100 UNIT/ML 100 UNIT/ML
500 SOLUTION INTRAVENOUS PRN
OUTPATIENT
Start: 2023-05-01

## 2023-04-27 RX ORDER — DEXAMETHASONE SODIUM PHOSPHATE 10 MG/ML
10 INJECTION, SOLUTION INTRAMUSCULAR; INTRAVENOUS ONCE
OUTPATIENT
Start: 2023-05-01 | End: 2023-05-01

## 2023-04-27 RX ORDER — ACETAMINOPHEN 325 MG/1
650 TABLET ORAL
OUTPATIENT
Start: 2023-05-01

## 2023-04-27 RX ORDER — SODIUM CHLORIDE 9 MG/ML
5-250 INJECTION, SOLUTION INTRAVENOUS PRN
OUTPATIENT
Start: 2023-05-01

## 2023-04-27 RX ORDER — SODIUM CHLORIDE 0.9 % (FLUSH) 0.9 %
5-40 SYRINGE (ML) INJECTION PRN
OUTPATIENT
Start: 2023-05-01

## 2023-04-27 RX ORDER — SODIUM CHLORIDE 9 MG/ML
INJECTION, SOLUTION INTRAVENOUS CONTINUOUS
OUTPATIENT
Start: 2023-05-01

## 2023-04-27 RX ORDER — ALBUTEROL SULFATE 90 UG/1
4 AEROSOL, METERED RESPIRATORY (INHALATION) PRN
OUTPATIENT
Start: 2023-05-01

## 2023-04-27 RX ORDER — PALONOSETRON HYDROCHLORIDE 0.05 MG/ML
0.25 INJECTION, SOLUTION INTRAVENOUS ONCE
OUTPATIENT
Start: 2023-05-01 | End: 2023-05-01

## 2023-04-27 RX ORDER — EPINEPHRINE 1 MG/ML
0.3 INJECTION, SOLUTION, CONCENTRATE INTRAVENOUS PRN
OUTPATIENT
Start: 2023-05-01

## 2023-04-27 RX ORDER — MEPERIDINE HYDROCHLORIDE 25 MG/ML
12.5 INJECTION INTRAMUSCULAR; INTRAVENOUS; SUBCUTANEOUS PRN
OUTPATIENT
Start: 2023-05-01

## 2023-04-27 NOTE — PROGRESS NOTES
[address]  [address]   Hematology/Oncology  Consult      Patient Name: Khoi Ho  YOB: 1955  PCP: Macho Wilkerson MD   Referring Provider:      Reason for Consultation:   Chief Complaint   Patient presents with    Cancer    Follow-up     Lung cancer      Interval history: No new complaints. Tolerating treatment well. Denies nausea vomiting fever chills diarrhea bleeding, hearing loss. Finished radiation to the lung mass. History of Present Illness:  74years old male presented to ER in August 2022 with chest pain. CT chest revealed left upper lobe collapse and mediastinal lymphadenopathy. Patient underwent bronchoscopy where an endobronchial left upper lobe mass was noted. Biopsy was done. Pathology was positive for minute focus of poorly differentiated carcinoma involving bronchial connective tissue, pulmonary parenchyma was not present. Definitive classification of carcinoma could not be determined. Patient was then lost to follow-up. Saw Dr. Nilesh Weeks last week and was referred for further management. Denies recent weight loss, loss of appetite any new pain. Patient is fully functional.  Pulmonary function testing showed 69% FEV1 predicted. Patient underwent bronchoscopy/EBUS on January 17, 2023. Endobronchial lesion was noted in left upper lobe with compression of the lingula and upper segment of lower lobe. Mediastinal lymphadenopathy was not noted. Left hilar lymph node station 11 was biopsied. Biopsy is positive for small cell CA. PET scan showed hypermetabolic left hilar lesion with an SUV of 30 and no distant metastasis. Equivocal mediastinal lymphadenopathy was noted which is probably reactive-SUV of 3. MRI brain negative for metastasis. 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 Not a surgical candidate considering N1 status. ECOG 1. Started cisplatin/etoposide in January 2023.

## 2023-05-01 ENCOUNTER — HOSPITAL ENCOUNTER (OUTPATIENT)
Dept: INFUSION THERAPY | Age: 68
Discharge: HOME | End: 2023-05-01
Payer: MEDICARE

## 2023-05-01 VITALS
RESPIRATION RATE: 18 BRPM | HEART RATE: 89 BPM | DIASTOLIC BLOOD PRESSURE: 92 MMHG | WEIGHT: 245.7 LBS | SYSTOLIC BLOOD PRESSURE: 172 MMHG | HEIGHT: 73 IN | BODY MASS INDEX: 32.56 KG/M2 | TEMPERATURE: 98.8 F | OXYGEN SATURATION: 99 %

## 2023-05-01 DIAGNOSIS — C80.1 MALIGNANT SMALL CELL CANCER (HCC): Primary | ICD-10-CM

## 2023-05-01 PROCEDURE — 96417 CHEMO IV INFUS EACH ADDL SEQ: CPT

## 2023-05-01 PROCEDURE — 96413 CHEMO IV INFUSION 1 HR: CPT

## 2023-05-01 PROCEDURE — 6360000002 HC RX W HCPCS: Performed by: INTERNAL MEDICINE

## 2023-05-01 PROCEDURE — 96375 TX/PRO/DX INJ NEW DRUG ADDON: CPT

## 2023-05-01 PROCEDURE — 96415 CHEMO IV INFUSION ADDL HR: CPT

## 2023-05-01 PROCEDURE — 2580000003 HC RX 258: Performed by: INTERNAL MEDICINE

## 2023-05-01 PROCEDURE — 96367 TX/PROPH/DG ADDL SEQ IV INF: CPT

## 2023-05-01 RX ORDER — SODIUM CHLORIDE 0.9 % (FLUSH) 0.9 %
5-40 SYRINGE (ML) INJECTION PRN
Status: CANCELLED | OUTPATIENT
Start: 2023-05-02

## 2023-05-01 RX ORDER — ACETAMINOPHEN 325 MG/1
650 TABLET ORAL
Status: CANCELLED | OUTPATIENT
Start: 2023-05-03

## 2023-05-01 RX ORDER — SODIUM CHLORIDE 9 MG/ML
5-250 INJECTION, SOLUTION INTRAVENOUS PRN
Status: CANCELLED | OUTPATIENT
Start: 2023-05-03

## 2023-05-01 RX ORDER — FAMOTIDINE 10 MG/ML
20 INJECTION, SOLUTION INTRAVENOUS
Status: CANCELLED | OUTPATIENT
Start: 2023-05-02

## 2023-05-01 RX ORDER — SODIUM CHLORIDE 9 MG/ML
5-250 INJECTION, SOLUTION INTRAVENOUS PRN
Status: CANCELLED | OUTPATIENT
Start: 2023-05-02

## 2023-05-01 RX ORDER — EPINEPHRINE 1 MG/ML
0.3 INJECTION, SOLUTION, CONCENTRATE INTRAVENOUS PRN
Status: CANCELLED | OUTPATIENT
Start: 2023-05-02

## 2023-05-01 RX ORDER — ONDANSETRON 2 MG/ML
8 INJECTION INTRAMUSCULAR; INTRAVENOUS
Status: CANCELLED | OUTPATIENT
Start: 2023-05-03

## 2023-05-01 RX ORDER — ONDANSETRON 2 MG/ML
8 INJECTION INTRAMUSCULAR; INTRAVENOUS
Status: CANCELLED | OUTPATIENT
Start: 2023-05-02

## 2023-05-01 RX ORDER — DEXAMETHASONE SODIUM PHOSPHATE 10 MG/ML
8 INJECTION INTRAMUSCULAR; INTRAVENOUS ONCE
Status: CANCELLED | OUTPATIENT
Start: 2023-05-02 | End: 2023-05-02

## 2023-05-01 RX ORDER — MEPERIDINE HYDROCHLORIDE 25 MG/ML
12.5 INJECTION INTRAMUSCULAR; INTRAVENOUS; SUBCUTANEOUS PRN
Status: CANCELLED | OUTPATIENT
Start: 2023-05-03

## 2023-05-01 RX ORDER — FAMOTIDINE 10 MG/ML
20 INJECTION, SOLUTION INTRAVENOUS
Status: CANCELLED | OUTPATIENT
Start: 2023-05-03

## 2023-05-01 RX ORDER — HEPARIN SODIUM (PORCINE) LOCK FLUSH IV SOLN 100 UNIT/ML 100 UNIT/ML
500 SOLUTION INTRAVENOUS PRN
Status: CANCELLED | OUTPATIENT
Start: 2023-05-02

## 2023-05-01 RX ORDER — HEPARIN SODIUM (PORCINE) LOCK FLUSH IV SOLN 100 UNIT/ML 100 UNIT/ML
500 SOLUTION INTRAVENOUS PRN
Status: CANCELLED | OUTPATIENT
Start: 2023-05-03

## 2023-05-01 RX ORDER — PALONOSETRON HYDROCHLORIDE 0.05 MG/ML
0.25 INJECTION, SOLUTION INTRAVENOUS ONCE
Status: COMPLETED | OUTPATIENT
Start: 2023-05-01 | End: 2023-05-01

## 2023-05-01 RX ORDER — DIPHENHYDRAMINE HYDROCHLORIDE 50 MG/ML
50 INJECTION INTRAMUSCULAR; INTRAVENOUS
Status: CANCELLED | OUTPATIENT
Start: 2023-05-03

## 2023-05-01 RX ORDER — SODIUM CHLORIDE 0.9 % (FLUSH) 0.9 %
5-40 SYRINGE (ML) INJECTION PRN
Status: CANCELLED | OUTPATIENT
Start: 2023-05-03

## 2023-05-01 RX ORDER — DEXAMETHASONE SODIUM PHOSPHATE 10 MG/ML
10 INJECTION INTRAMUSCULAR; INTRAVENOUS ONCE
Status: COMPLETED | OUTPATIENT
Start: 2023-05-01 | End: 2023-05-01

## 2023-05-01 RX ORDER — ALBUTEROL SULFATE 90 UG/1
4 AEROSOL, METERED RESPIRATORY (INHALATION) PRN
Status: CANCELLED | OUTPATIENT
Start: 2023-05-03

## 2023-05-01 RX ORDER — SODIUM CHLORIDE 9 MG/ML
INJECTION, SOLUTION INTRAVENOUS CONTINUOUS
Status: CANCELLED | OUTPATIENT
Start: 2023-05-02

## 2023-05-01 RX ORDER — HEPARIN SODIUM (PORCINE) LOCK FLUSH IV SOLN 100 UNIT/ML 100 UNIT/ML
500 SOLUTION INTRAVENOUS PRN
Status: DISCONTINUED | OUTPATIENT
Start: 2023-05-01 | End: 2023-05-02 | Stop reason: HOSPADM

## 2023-05-01 RX ORDER — EPINEPHRINE 1 MG/ML
0.3 INJECTION, SOLUTION, CONCENTRATE INTRAVENOUS PRN
Status: CANCELLED | OUTPATIENT
Start: 2023-05-03

## 2023-05-01 RX ORDER — ACETAMINOPHEN 325 MG/1
650 TABLET ORAL
Status: CANCELLED | OUTPATIENT
Start: 2023-05-02

## 2023-05-01 RX ORDER — SODIUM CHLORIDE 0.9 % (FLUSH) 0.9 %
5-40 SYRINGE (ML) INJECTION PRN
Status: DISCONTINUED | OUTPATIENT
Start: 2023-05-01 | End: 2023-05-02 | Stop reason: HOSPADM

## 2023-05-01 RX ORDER — DEXAMETHASONE SODIUM PHOSPHATE 10 MG/ML
8 INJECTION INTRAMUSCULAR; INTRAVENOUS ONCE
Status: CANCELLED | OUTPATIENT
Start: 2023-05-03 | End: 2023-05-03

## 2023-05-01 RX ORDER — ALBUTEROL SULFATE 90 UG/1
4 AEROSOL, METERED RESPIRATORY (INHALATION) PRN
Status: CANCELLED | OUTPATIENT
Start: 2023-05-02

## 2023-05-01 RX ORDER — SODIUM CHLORIDE 9 MG/ML
5-250 INJECTION, SOLUTION INTRAVENOUS PRN
Status: DISCONTINUED | OUTPATIENT
Start: 2023-05-01 | End: 2023-05-02 | Stop reason: HOSPADM

## 2023-05-01 RX ORDER — SODIUM CHLORIDE 9 MG/ML
INJECTION, SOLUTION INTRAVENOUS CONTINUOUS
Status: CANCELLED | OUTPATIENT
Start: 2023-05-03

## 2023-05-01 RX ORDER — MEPERIDINE HYDROCHLORIDE 25 MG/ML
12.5 INJECTION INTRAMUSCULAR; INTRAVENOUS; SUBCUTANEOUS PRN
Status: CANCELLED | OUTPATIENT
Start: 2023-05-02

## 2023-05-01 RX ORDER — DIPHENHYDRAMINE HYDROCHLORIDE 50 MG/ML
50 INJECTION INTRAMUSCULAR; INTRAVENOUS
Status: CANCELLED | OUTPATIENT
Start: 2023-05-02

## 2023-05-01 RX ADMIN — SODIUM CHLORIDE, PRESERVATIVE FREE 10 ML: 5 INJECTION INTRAVENOUS at 08:40

## 2023-05-01 RX ADMIN — POTASSIUM CHLORIDE: 2 INJECTION, SOLUTION, CONCENTRATE INTRAVENOUS at 11:02

## 2023-05-01 RX ADMIN — SODIUM CHLORIDE 150 MG: 9 INJECTION, SOLUTION INTRAVENOUS at 09:09

## 2023-05-01 RX ADMIN — SODIUM CHLORIDE 20 ML/HR: 9 INJECTION, SOLUTION INTRAVENOUS at 08:45

## 2023-05-01 RX ADMIN — PALONOSETRON 0.25 MG: 0.25 INJECTION, SOLUTION INTRAVENOUS at 08:51

## 2023-05-01 RX ADMIN — DEXAMETHASONE SODIUM PHOSPHATE 10 MG: 10 INJECTION INTRAMUSCULAR; INTRAVENOUS at 08:52

## 2023-05-01 RX ADMIN — ETOPOSIDE 240 MG: 20 INJECTION INTRAVENOUS at 09:53

## 2023-05-01 RX ADMIN — Medication 500 UNITS: at 13:14

## 2023-05-02 ENCOUNTER — HOSPITAL ENCOUNTER (OUTPATIENT)
Dept: INFUSION THERAPY | Age: 68
Discharge: HOME | End: 2023-05-02
Payer: MEDICARE

## 2023-05-02 VITALS
DIASTOLIC BLOOD PRESSURE: 85 MMHG | HEART RATE: 80 BPM | SYSTOLIC BLOOD PRESSURE: 164 MMHG | TEMPERATURE: 98.3 F | OXYGEN SATURATION: 95 % | RESPIRATION RATE: 18 BRPM

## 2023-05-02 DIAGNOSIS — C80.1 MALIGNANT SMALL CELL CANCER (HCC): Primary | ICD-10-CM

## 2023-05-02 PROCEDURE — 96413 CHEMO IV INFUSION 1 HR: CPT

## 2023-05-02 PROCEDURE — 6360000002 HC RX W HCPCS: Performed by: NURSE PRACTITIONER

## 2023-05-02 PROCEDURE — 2580000003 HC RX 258: Performed by: NURSE PRACTITIONER

## 2023-05-02 PROCEDURE — 96375 TX/PRO/DX INJ NEW DRUG ADDON: CPT

## 2023-05-02 RX ORDER — HEPARIN SODIUM (PORCINE) LOCK FLUSH IV SOLN 100 UNIT/ML 100 UNIT/ML
500 SOLUTION INTRAVENOUS PRN
Status: DISCONTINUED | OUTPATIENT
Start: 2023-05-02 | End: 2023-05-03 | Stop reason: HOSPADM

## 2023-05-02 RX ORDER — SODIUM CHLORIDE 9 MG/ML
5-250 INJECTION, SOLUTION INTRAVENOUS PRN
Status: DISCONTINUED | OUTPATIENT
Start: 2023-05-02 | End: 2023-05-03 | Stop reason: HOSPADM

## 2023-05-02 RX ORDER — SODIUM CHLORIDE 0.9 % (FLUSH) 0.9 %
5-40 SYRINGE (ML) INJECTION PRN
Status: DISCONTINUED | OUTPATIENT
Start: 2023-05-02 | End: 2023-05-03 | Stop reason: HOSPADM

## 2023-05-02 RX ORDER — DEXAMETHASONE SODIUM PHOSPHATE 10 MG/ML
8 INJECTION INTRAMUSCULAR; INTRAVENOUS ONCE
Status: COMPLETED | OUTPATIENT
Start: 2023-05-02 | End: 2023-05-02

## 2023-05-02 RX ADMIN — SODIUM CHLORIDE, PRESERVATIVE FREE 10 ML: 5 INJECTION INTRAVENOUS at 10:12

## 2023-05-02 RX ADMIN — SODIUM CHLORIDE, PRESERVATIVE FREE 10 ML: 5 INJECTION INTRAVENOUS at 08:18

## 2023-05-02 RX ADMIN — ETOPOSIDE 240 MG: 20 INJECTION INTRAVENOUS at 08:58

## 2023-05-02 RX ADMIN — Medication 500 UNITS: at 10:12

## 2023-05-02 RX ADMIN — DEXAMETHASONE SODIUM PHOSPHATE 8 MG: 10 INJECTION INTRAMUSCULAR; INTRAVENOUS at 08:28

## 2023-05-02 RX ADMIN — SODIUM CHLORIDE 20 ML/HR: 9 INJECTION, SOLUTION INTRAVENOUS at 08:26

## 2023-05-03 ENCOUNTER — HOSPITAL ENCOUNTER (OUTPATIENT)
Dept: INFUSION THERAPY | Age: 68
Discharge: HOME | End: 2023-05-03
Payer: MEDICARE

## 2023-05-03 VITALS
SYSTOLIC BLOOD PRESSURE: 197 MMHG | HEART RATE: 87 BPM | RESPIRATION RATE: 18 BRPM | OXYGEN SATURATION: 96 % | DIASTOLIC BLOOD PRESSURE: 88 MMHG | TEMPERATURE: 98.2 F

## 2023-05-03 DIAGNOSIS — C80.1 MALIGNANT SMALL CELL CANCER (HCC): Primary | ICD-10-CM

## 2023-05-03 PROCEDURE — 2580000003 HC RX 258: Performed by: NURSE PRACTITIONER

## 2023-05-03 PROCEDURE — 96375 TX/PRO/DX INJ NEW DRUG ADDON: CPT

## 2023-05-03 PROCEDURE — 96413 CHEMO IV INFUSION 1 HR: CPT

## 2023-05-03 PROCEDURE — 6360000002 HC RX W HCPCS: Performed by: NURSE PRACTITIONER

## 2023-05-03 RX ORDER — HEPARIN SODIUM (PORCINE) LOCK FLUSH IV SOLN 100 UNIT/ML 100 UNIT/ML
500 SOLUTION INTRAVENOUS PRN
Status: DISCONTINUED | OUTPATIENT
Start: 2023-05-03 | End: 2023-05-04 | Stop reason: HOSPADM

## 2023-05-03 RX ORDER — DEXAMETHASONE SODIUM PHOSPHATE 10 MG/ML
8 INJECTION INTRAMUSCULAR; INTRAVENOUS ONCE
Status: COMPLETED | OUTPATIENT
Start: 2023-05-03 | End: 2023-05-03

## 2023-05-03 RX ORDER — SODIUM CHLORIDE 9 MG/ML
5-250 INJECTION, SOLUTION INTRAVENOUS PRN
Status: DISCONTINUED | OUTPATIENT
Start: 2023-05-03 | End: 2023-05-04 | Stop reason: HOSPADM

## 2023-05-03 RX ORDER — SODIUM CHLORIDE 0.9 % (FLUSH) 0.9 %
5-40 SYRINGE (ML) INJECTION PRN
Status: DISCONTINUED | OUTPATIENT
Start: 2023-05-03 | End: 2023-05-04 | Stop reason: HOSPADM

## 2023-05-03 RX ADMIN — SODIUM CHLORIDE, PRESERVATIVE FREE 10 ML: 5 INJECTION INTRAVENOUS at 08:29

## 2023-05-03 RX ADMIN — ETOPOSIDE 240 MG: 20 INJECTION INTRAVENOUS at 08:57

## 2023-05-03 RX ADMIN — DEXAMETHASONE SODIUM PHOSPHATE 8 MG: 10 INJECTION INTRAMUSCULAR; INTRAVENOUS at 08:35

## 2023-05-03 RX ADMIN — SODIUM CHLORIDE 20 ML/HR: 9 INJECTION, SOLUTION INTRAVENOUS at 08:33

## 2023-05-03 RX ADMIN — Medication 500 UNITS: at 10:10

## 2023-05-03 NOTE — PROGRESS NOTES
Patient tolerated infusion well. Patient alert and oriented x3. No distress noted. Vital signs stable. Patient denies any new or worsening pain. Patient educated on signs and symptoms of reaction to medication. Educated patient on possible side effects and treatment of medication. Patient verbalized understanding. Offered patient education an/or discharge material.  Patient declined. Patient denies any needs. All   questions answered. Patient's blood pressure is high will follow up with [place] doctor to get medication adjusted.

## 2023-05-22 ENCOUNTER — TELEPHONE (OUTPATIENT)
Dept: RADIATION ONCOLOGY | Age: 68
End: 2023-05-22

## 2023-05-22 ENCOUNTER — HOSPITAL ENCOUNTER (OUTPATIENT)
Dept: RADIATION ONCOLOGY | Age: 68
Discharge: HOME | End: 2023-05-22

## 2023-05-22 VITALS
SYSTOLIC BLOOD PRESSURE: 162 MMHG | OXYGEN SATURATION: 99 % | BODY MASS INDEX: 31.85 KG/M2 | DIASTOLIC BLOOD PRESSURE: 80 MMHG | WEIGHT: 241.4 LBS | RESPIRATION RATE: 18 BRPM | HEART RATE: 89 BPM | TEMPERATURE: 98.6 F

## 2023-05-22 DIAGNOSIS — C34.92 SMALL CELL LUNG CANCER, LEFT (HCC): Primary | ICD-10-CM

## 2023-05-22 PROCEDURE — 99999 PR OFFICE/OUTPT VISIT,PROCEDURE ONLY: CPT | Performed by: NURSE PRACTITIONER

## 2023-05-22 NOTE — TELEPHONE ENCOUNTER
LVM with [facility] [employer]'s phone number for patient to call and schedule MRI of the Brain that was ordered today. Left my name and phone number for patient to call back with any questions.

## 2023-05-22 NOTE — PROGRESS NOTES
RADIATION ONCOLOGY  4 week follow up       05/22/2023       NAME:  Juventino Perea    YOB: 1955    Diagnosis:  LS-SCLC     Subjective: On 04/12/2023, Juventino Perea completed 6600 cGy in 33 fractions directed to the ILENE, mdstnm LNs  + ILENE boost. Chemotherapy Etoposide + Cisplatin per Medical Oncology started 01/30/2023 and cycle #4 was given on 05/18/2023. The patient is seen today in 4 week post RT completion symptom management check. The patient denies skin complaints or pain complaints. The patient denies SOB, productive cough or hemoptysis. No wheezing or pleuritic pain. No fevers or chills. No nausea, vomiting or diarrhea. The patient no longer smoking, continues on Chantix. Patient is following with:    Alannah Giles- Dr. Katie Nguyen. Pain: no pain complaints. Past medical, surgical, social and family histories reviewed and updated as indicated. ALLERGIES:  Patient has no known allergies. Current Outpatient Medications   Medication Sig Dispense Refill    lidocaine-prilocaine (EMLA) 2.5-2.5 % cream Apply topically as needed. 5 g 1    ibuprofen (ADVIL;MOTRIN) 800 MG tablet Take 1 tablet by mouth every 6 hours as needed for Pain 20 tablet 0    prochlorperazine (COMPAZINE) 10 MG tablet Take 1 tablet by mouth every 6 hours as needed (for nausea/vomiting) 40 tablet 3    tiotropium (SPIRIVA RESPIMAT) 2.5 MCG/ACT AERS inhaler Inhale 2 puffs into the lungs daily      varenicline (CHANTIX) 0.5 MG tablet Take 1 tablet by mouth daily Follow dosing quidelines      varenicline (CHANTIX) 1 MG tablet Take 1 tablet by mouth in the morning and at bedtime After completing first dosing cycle.       aspirin 81 MG EC tablet Take 1 tablet by mouth daily 30 tablet 3    lisinopril (PRINIVIL;ZESTRIL) 10 MG tablet Take 1 tablet by mouth daily      Misc Natural Products (URINOZINC PO) Take by mouth daily      pantoprazole (PROTONIX) 40 MG tablet Take 1 tablet by mouth daily

## 2023-05-22 NOTE — PROGRESS NOTES
Juventino Perea  5/22/2023  8:29 AM      Vitals:    05/22/23 0827   BP: (!) 182/85   Pulse: 89   Resp: 18   Temp: 98.6 °F (37 °C)   SpO2: 99%    : Wt Readings from Last 3 Encounters:   05/22/23 241 lb 6.4 oz (109.5 kg)   05/01/23 245 lb 11.2 oz (111.4 kg)   04/27/23 243 lb 14.4 oz (110.6 kg)                Current Outpatient Medications:     lidocaine-prilocaine (EMLA) 2.5-2.5 % cream, Apply topically as needed. , Disp: 5 g, Rfl: 1    ibuprofen (ADVIL;MOTRIN) 800 MG tablet, Take 1 tablet by mouth every 6 hours as needed for Pain, Disp: 20 tablet, Rfl: 0    prochlorperazine (COMPAZINE) 10 MG tablet, Take 1 tablet by mouth every 6 hours as needed (for nausea/vomiting), Disp: 40 tablet, Rfl: 3    tiotropium (SPIRIVA RESPIMAT) 2.5 MCG/ACT AERS inhaler, Inhale 2 puffs into the lungs daily, Disp: , Rfl:     varenicline (CHANTIX) 0.5 MG tablet, Take 0.5 mg by mouth daily Follow dosing quidelines, Disp: , Rfl:     varenicline (CHANTIX) 1 MG tablet, Take 1 mg by mouth in the morning and at bedtime After completing first dosing cycle., Disp: , Rfl:     aspirin 81 MG EC tablet, Take 1 tablet by mouth daily, Disp: 30 tablet, Rfl: 3    lisinopril (PRINIVIL;ZESTRIL) 10 MG tablet, Take 10 mg by mouth daily, Disp: , Rfl:     Misc Natural Products (URINOZINC PO), Take by mouth daily, Disp: , Rfl:     pantoprazole (PROTONIX) 40 MG tablet, Take 40 mg by mouth daily, Disp: , Rfl:     clopidogrel (PLAVIX) 75 MG tablet, Take 1 tablet by mouth daily, Disp: 30 tablet, Rfl: 0    albuterol sulfate HFA (VENTOLIN HFA) 108 (90 Base) MCG/ACT inhaler, Inhale 2 puffs into the lungs 4 times daily as needed for Wheezing, Disp: 18 g, Rfl: 5    atorvastatin (LIPITOR) 40 MG tablet, Take 1 tablet by mouth daily, Disp: 30 tablet, Rfl: 3    amLODIPine (NORVASC) 10 MG tablet, Take 1 tablet by mouth daily, Disp: , Rfl:       Patient is seen today in follow up for RT to his ILENE, mediastinum & ILENE boost for his Stage II SCLC of his ILENE & mediastinum.  The

## 2023-05-25 ENCOUNTER — HOSPITAL ENCOUNTER (OUTPATIENT)
Dept: INFUSION THERAPY | Age: 68
End: 2023-05-25

## 2023-06-01 ENCOUNTER — HOSPITAL ENCOUNTER (OUTPATIENT)
Age: 68
Discharge: HOME | End: 2023-06-01
Payer: OTHER GOVERNMENT

## 2023-06-01 ENCOUNTER — HOSPITAL ENCOUNTER (OUTPATIENT)
Dept: MRI IMAGING | Age: 68
Discharge: HOME | End: 2023-06-03
Payer: OTHER GOVERNMENT

## 2023-06-01 DIAGNOSIS — C80.1 MALIGNANT SMALL CELL CANCER (HCC): ICD-10-CM

## 2023-06-01 DIAGNOSIS — C34.92 SMALL CELL LUNG CANCER, LEFT (HCC): ICD-10-CM

## 2023-06-01 LAB
ALBUMIN SERPL-MCNC: 4.3 G/DL (ref 3.5–5.2)
ALP SERPL-CCNC: 74 U/L (ref 40–129)
ALT SERPL-CCNC: 15 U/L (ref 0–40)
ANION GAP SERPL CALCULATED.3IONS-SCNC: 7 MMOL/L (ref 7–16)
AST SERPL-CCNC: 21 U/L (ref 0–39)
BASOPHILS # BLD: 0.06 E9/L (ref 0–0.2)
BASOPHILS NFR BLD: 0.7 % (ref 0–2)
BILIRUB SERPL-MCNC: 0.3 MG/DL (ref 0–1.2)
BUN SERPL-MCNC: 13 MG/DL (ref 6–23)
CALCIUM SERPL-MCNC: 9.1 MG/DL (ref 8.6–10.2)
CHLORIDE SERPL-SCNC: 101 MMOL/L (ref 98–107)
CO2 SERPL-SCNC: 27 MMOL/L (ref 22–29)
CREAT SERPL-MCNC: 0.8 MG/DL (ref 0.7–1.2)
EOSINOPHIL # BLD: 0.07 E9/L (ref 0.05–0.5)
EOSINOPHIL NFR BLD: 0.9 % (ref 0–6)
ERYTHROCYTE [DISTWIDTH] IN BLOOD BY AUTOMATED COUNT: 15.4 FL (ref 11.5–15)
GLUCOSE SERPL-MCNC: 107 MG/DL (ref 74–99)
HCT VFR BLD AUTO: 39.2 % (ref 37–54)
HGB BLD-MCNC: 13.4 G/DL (ref 12.5–16.5)
IMM GRANULOCYTES # BLD: 0.1 E9/L
IMM GRANULOCYTES NFR BLD: 1.2 % (ref 0–5)
LYMPHOCYTES # BLD: 1.4 E9/L (ref 1.5–4)
LYMPHOCYTES NFR BLD: 17.2 % (ref 20–42)
MCH RBC QN AUTO: 32.8 PG (ref 26–35)
MCHC RBC AUTO-ENTMCNC: 34.2 % (ref 32–34.5)
MCV RBC AUTO: 95.8 FL (ref 80–99.9)
MONOCYTES # BLD: 1.22 E9/L (ref 0.1–0.95)
MONOCYTES NFR BLD: 15 % (ref 2–12)
NEUTROPHILS # BLD: 5.31 E9/L (ref 1.8–7.3)
NEUTS SEG NFR BLD: 65 % (ref 43–80)
PLATELET # BLD AUTO: 293 E9/L (ref 130–450)
PMV BLD AUTO: 8.2 FL (ref 7–12)
POTASSIUM SERPL-SCNC: 4.6 MMOL/L (ref 3.5–5)
PROT SERPL-MCNC: 7.1 G/DL (ref 6.4–8.3)
RBC # BLD AUTO: 4.09 E12/L (ref 3.8–5.8)
SODIUM SERPL-SCNC: 135 MMOL/L (ref 132–146)
WBC # BLD: 8.2 E9/L (ref 4.5–11.5)

## 2023-06-01 PROCEDURE — 36415 COLL VENOUS BLD VENIPUNCTURE: CPT

## 2023-06-01 PROCEDURE — 6360000004 HC RX CONTRAST MEDICATION: Performed by: RADIOLOGY

## 2023-06-01 PROCEDURE — 70553 MRI BRAIN STEM W/O & W/DYE: CPT

## 2023-06-01 PROCEDURE — A9577 INJ MULTIHANCE: HCPCS | Performed by: RADIOLOGY

## 2023-06-01 PROCEDURE — 85025 COMPLETE CBC W/AUTO DIFF WBC: CPT

## 2023-06-01 PROCEDURE — 80053 COMPREHEN METABOLIC PANEL: CPT

## 2023-06-01 RX ADMIN — GADOBENATE DIMEGLUMINE 20 ML: 529 INJECTION, SOLUTION INTRAVENOUS at 08:24

## 2023-06-05 ENCOUNTER — OFFICE VISIT (OUTPATIENT)
Dept: VASCULAR SURGERY | Age: 68
End: 2023-06-05
Payer: MEDICARE

## 2023-06-05 VITALS — HEIGHT: 73 IN | WEIGHT: 241 LBS | BODY MASS INDEX: 31.94 KG/M2

## 2023-06-05 DIAGNOSIS — Z86.79 STATUS POST ENDOVASCULAR ANEURYSM REPAIR (EVAR): Primary | ICD-10-CM

## 2023-06-05 DIAGNOSIS — Z98.890 STATUS POST ENDOVASCULAR ANEURYSM REPAIR (EVAR): Primary | ICD-10-CM

## 2023-06-05 PROCEDURE — 99213 OFFICE O/P EST LOW 20 MIN: CPT

## 2023-06-05 PROCEDURE — 1124F ACP DISCUSS-NO DSCNMKR DOCD: CPT

## 2023-06-05 NOTE — PROGRESS NOTES
37.3 (H) 02/20/2023    K 4.6 06/01/2023    BUN 13 06/01/2023    CREATININE 0.8 06/01/2023     PHYSICAL EXAM:    CONSTITUTIONAL:  awake, alert, cooperative  PSYCHIATRIC :  Oriented to time, place and person     Appropriate insight to disease process  EYES: Lids and lashes normal  ENT:  External ears and nose without lesions   Hearing deficits not noted  NECK:  supple, symmetrical, trachea midline  LUNGS:  No increased work of breathing                 Clear to auscultation  CARDIOVASCULAR:  regular rate and rhythm   ABDOMEN:   Soft, non-distended, non-tender    Aorta is palpable  SKIN:  normal skin color, texture, turgor  EXTREMITIES:   R LE Groin incision c/d/I, no hematoma  Edema absent  L LE Groin incision c/d/I, no hematoma  Edema absent  R posterior tibial biphasic L posterior tibial biphasic   R dorsalis pedis biphasic L dorsalis pedis biphasic     RADIOLOGY:    A/P Assx 5.1 cm Abdominal Aortic Aneurysm s/p EVAR 11/10/22  Wounds healed  Neurovascular stable  Continue medical management with asa, plavix, and statin  Emphasized importance of Tobacco cessation  He has quit smoking  Discussed with patient pathophysiology of AAA, risk of rupture and all ?s answered  Discussed with patient symptoms of abdominal pain, back pain and need to go to ER immediately if they if any symptoms developed  He is scheduled for CT abdomen in 2 days. We will evaluate this imaging and make follow up recommendations based off those results.     Pt seen and plan reviewed with Dr. Delois Litten, APRN - CNP

## 2023-06-06 ENCOUNTER — HOSPITAL ENCOUNTER (OUTPATIENT)
Dept: INFUSION THERAPY | Age: 68
Discharge: HOME | End: 2023-06-06
Payer: MEDICARE

## 2023-06-06 DIAGNOSIS — C80.1 MALIGNANT SMALL CELL CANCER (HCC): ICD-10-CM

## 2023-06-06 LAB
ALBUMIN SERPL-MCNC: 4.1 G/DL (ref 3.5–5.2)
ALP SERPL-CCNC: 77 U/L (ref 40–129)
ALT SERPL-CCNC: 13 U/L (ref 0–40)
ANION GAP SERPL CALCULATED.3IONS-SCNC: 10 MMOL/L (ref 7–16)
AST SERPL-CCNC: 18 U/L (ref 0–39)
BILIRUB SERPL-MCNC: 0.4 MG/DL (ref 0–1.2)
BUN SERPL-MCNC: 11 MG/DL (ref 6–23)
CALCIUM SERPL-MCNC: 9.2 MG/DL (ref 8.6–10.2)
CHLORIDE SERPL-SCNC: 99 MMOL/L (ref 98–107)
CO2 SERPL-SCNC: 24 MMOL/L (ref 22–29)
CREAT SERPL-MCNC: 0.8 MG/DL (ref 0.7–1.2)
GLUCOSE SERPL-MCNC: 106 MG/DL (ref 74–99)
POTASSIUM SERPL-SCNC: 3.9 MMOL/L (ref 3.5–5)
PROT SERPL-MCNC: 6.9 G/DL (ref 6.4–8.3)
SODIUM SERPL-SCNC: 133 MMOL/L (ref 132–146)

## 2023-06-06 PROCEDURE — 85025 COMPLETE CBC W/AUTO DIFF WBC: CPT

## 2023-06-06 PROCEDURE — 80053 COMPREHEN METABOLIC PANEL: CPT

## 2023-06-06 PROCEDURE — 36415 COLL VENOUS BLD VENIPUNCTURE: CPT

## 2023-06-07 ENCOUNTER — HOSPITAL ENCOUNTER (OUTPATIENT)
Age: 68
Discharge: HOME | End: 2023-06-07
Payer: OTHER GOVERNMENT

## 2023-06-07 ENCOUNTER — HOSPITAL ENCOUNTER (OUTPATIENT)
Dept: INFUSION THERAPY | Age: 68
Discharge: HOME | End: 2023-06-07
Payer: MEDICARE

## 2023-06-07 ENCOUNTER — HOSPITAL ENCOUNTER (OUTPATIENT)
Dept: CT IMAGING | Age: 68
Discharge: HOME | End: 2023-06-07
Payer: OTHER GOVERNMENT

## 2023-06-07 DIAGNOSIS — C80.1 MALIGNANT SMALL CELL CANCER (HCC): ICD-10-CM

## 2023-06-07 LAB
BASOPHILS # BLD: 0 E9/L (ref 0–0.2)
BASOPHILS # BLD: 0.05 E9/L (ref 0–0.2)
BASOPHILS NFR BLD: 0 % (ref 0–2)
BASOPHILS NFR BLD: 0.5 % (ref 0–2)
EOSINOPHIL # BLD: 0 E9/L (ref 0.05–0.5)
EOSINOPHIL # BLD: 0.09 E9/L (ref 0.05–0.5)
EOSINOPHIL NFR BLD: 0 % (ref 0–6)
EOSINOPHIL NFR BLD: 1 % (ref 0–6)
ERYTHROCYTE [DISTWIDTH] IN BLOOD BY AUTOMATED COUNT: 14.9 FL (ref 11.5–15)
ERYTHROCYTE [DISTWIDTH] IN BLOOD BY AUTOMATED COUNT: 15.1 FL (ref 11.5–15)
HCT VFR BLD AUTO: 39.6 % (ref 37–54)
HCT VFR BLD AUTO: 39.8 % (ref 37–54)
HGB BLD-MCNC: 13.3 G/DL (ref 12.5–16.5)
HGB BLD-MCNC: 13.7 G/DL (ref 12.5–16.5)
IMM GRANULOCYTES # BLD: 0.08 E9/L
IMM GRANULOCYTES # BLD: 0.7 E9/L
IMM GRANULOCYTES NFR BLD: 0.7 % (ref 0–5)
IMM GRANULOCYTES NFR BLD: 0.9 % (ref 0–5)
LYMPHOCYTES # BLD: 0 E9/L (ref 1.5–4)
LYMPHOCYTES # BLD: 1.07 E9/L (ref 1.5–4)
LYMPHOCYTES NFR BLD: 0 % (ref 20–42)
LYMPHOCYTES NFR BLD: 11.4 % (ref 20–42)
MCH RBC QN AUTO: 32.3 PG (ref 26–35)
MCH RBC QN AUTO: 32.8 PG (ref 26–35)
MCHC RBC AUTO-ENTMCNC: 33.6 % (ref 32–34.5)
MCHC RBC AUTO-ENTMCNC: 34.4 % (ref 32–34.5)
MCV RBC AUTO: 93.9 FL (ref 80–99.9)
MCV RBC AUTO: 97.5 FL (ref 80–99.9)
MONOCYTES # BLD: 0 E9/L (ref 0.1–0.95)
MONOCYTES # BLD: 0.97 E9/L (ref 0.1–0.95)
MONOCYTES NFR BLD: 0 % (ref 2–12)
MONOCYTES NFR BLD: 10.4 % (ref 2–12)
NEUTROPHILS # BLD: 0 E9/L (ref 1.8–7.3)
NEUTROPHILS # BLD: 7.09 E9/L (ref 1.8–7.3)
NEUTS SEG NFR BLD: 0 % (ref 43–80)
NEUTS SEG NFR BLD: 75.8 % (ref 43–80)
PLATELET # BLD AUTO: 247 E9/L (ref 130–450)
PLATELET # BLD AUTO: 298 E9/L (ref 130–450)
PMV BLD AUTO: 8.4 FL (ref 7–12)
PMV BLD AUTO: 8.6 FL (ref 7–12)
RBC # BLD AUTO: 4.06 E12/L (ref 3.8–5.8)
RBC # BLD AUTO: 4.24 E12/L (ref 3.8–5.8)
WBC # BLD: 8.4 E9/L (ref 4.5–11.5)
WBC # BLD: 9.4 E9/L (ref 4.5–11.5)

## 2023-06-07 PROCEDURE — 6360000004 HC RX CONTRAST MEDICATION: Performed by: RADIOLOGY

## 2023-06-07 PROCEDURE — 85025 COMPLETE CBC W/AUTO DIFF WBC: CPT

## 2023-06-07 PROCEDURE — 74177 CT ABD & PELVIS W/CONTRAST: CPT

## 2023-06-07 PROCEDURE — 36415 COLL VENOUS BLD VENIPUNCTURE: CPT

## 2023-06-07 PROCEDURE — 71260 CT THORAX DX C+: CPT

## 2023-06-07 RX ADMIN — IOPAMIDOL 75 ML: 755 INJECTION, SOLUTION INTRAVENOUS at 08:21

## 2023-06-08 ENCOUNTER — HOSPITAL ENCOUNTER (OUTPATIENT)
Dept: RADIATION ONCOLOGY | Age: 68
Discharge: HOME | End: 2023-06-08

## 2023-06-08 ENCOUNTER — OFFICE VISIT (OUTPATIENT)
Dept: ONCOLOGY | Age: 68
End: 2023-06-08
Payer: MEDICARE

## 2023-06-08 VITALS
OXYGEN SATURATION: 97 % | WEIGHT: 241.6 LBS | BODY MASS INDEX: 32.02 KG/M2 | HEIGHT: 73 IN | TEMPERATURE: 98.3 F | SYSTOLIC BLOOD PRESSURE: 190 MMHG | DIASTOLIC BLOOD PRESSURE: 93 MMHG | HEART RATE: 93 BPM

## 2023-06-08 DIAGNOSIS — C34.90 MALIGNANT NEOPLASM OF LUNG, UNSPECIFIED LATERALITY, UNSPECIFIED PART OF LUNG (HCC): Primary | ICD-10-CM

## 2023-06-08 DIAGNOSIS — C80.1 MALIGNANT SMALL CELL CANCER (HCC): Primary | ICD-10-CM

## 2023-06-08 PROCEDURE — 99213 OFFICE O/P EST LOW 20 MIN: CPT

## 2023-06-08 NOTE — PROGRESS NOTES
varenicline (CHANTIX) 1 MG tablet Take 1 tablet by mouth in the morning and at bedtime After completing first dosing cycle. 12/30/22   Historical Provider, MD   aspirin 81 MG EC tablet Take 1 tablet by mouth daily 11/11/22   BRITTNEY Lara - CNP   lisinopril (PRINIVIL;ZESTRIL) 10 MG tablet Take 1 tablet by mouth daily    Historical Provider, MD   Misc Natural Products (URINOZINC PO) Take by mouth daily    Historical Provider, MD   pantoprazole (PROTONIX) 40 MG tablet Take 1 tablet by mouth daily    Historical Provider, MD   clopidogrel (PLAVIX) 75 MG tablet Take 1 tablet by mouth daily 10/21/22   Jayy Cox MD   albuterol sulfate HFA (VENTOLIN HFA) 108 (90 Base) MCG/ACT inhaler Inhale 2 puffs into the lungs 4 times daily as needed for Wheezing 8/31/22   Monty Zuniga MD   atorvastatin (LIPITOR) 40 MG tablet Take 1 tablet by mouth daily 9/1/22   Grecia Gusman MD   amLODIPine (NORVASC) 10 MG tablet Take 1 tablet by mouth daily    Historical Provider, MD       Allergies  No Known Allergies    Review of Systems:      Objective  BP (!) 190/93   Pulse 93   Temp 98.3 °F (36.8 °C)   Ht 6' 1\" (1.854 m)   Wt 241 lb 9.6 oz (109.6 kg)   SpO2 97%   BMI 31.88 kg/m²     Physical Performance Status    Physical Exam:  General: AAO to person, place, time, and purpose, appears stated age, cooperative, no acute distress, pleasant   Head and neck : PERRLA, EOMI . Sclera non icteric. Oropharynx : Clear  Neck: no JVD,  no adenopathy, no carotid bruit  LYMPHATICS : No peripheral lymphadenopathy. Lungs: Clear to auscultation   Heart: Regular rate and regular rhythm, no murmur, normal S1, S2  Abdomen: Soft, non-tender;no masses, no organomegaly  Extremities: No edema,no cyanosis, no clubbing. Skin:  No rash. Neurologic:Cranial nerves grossly intact. No focal motor or sensory deficits .     Recent Laboratory Data-   Lab Results   Component Value Date    WBC 9.4 06/07/2023    HGB 13.7 06/07/2023    HCT 39.8 06/07/2023

## 2023-06-08 NOTE — PROGRESS NOTES
Approximately 20 minutes was spent with the patient about radiation therapy to his brain utilizing handouts and slides. Patient completed XRT and cisplatin to ILENE mediastinum  and ILENE boost 2/27/23-4/12/23 33fx/6600cGy for small cell lung cancer. Patient had a MRI of the brain on 6/1/23 that showed no intracranial metastasis, no acute intracranial ischemia, edema, or hemorrhage, stable chronic left temporal lobe. Patient is here today for PCI XRT. Patient verbalized understanding of care and all questions/concerns were answered from a nursing perspective.
(NORVASC) 10 MG tablet Take 1 tablet by mouth daily       No current facility-administered medications for this encounter. REVIEW OF SYSTEMS  {ros master:[id number]}      Physical Exam      A/P:      *I spent at least *** on this case and with this patient today including personally performing/reviewing the history, ROS, PE, and providing a summary and description of the detailed medical decision making as a basis for any and all recommendations made today (+/- a pertinent RBA discussion): literature and radiology reviews were performed as noted and applicable (12% or more time was spent in direct patient ). ***          Sherif Harrington MD [place] [address] Oncology  Cell: [phone]  [facility]:  [phone]   FAX: [phone]  [address] [place] [address]:   [address] [place] [address] Avenue:    [phone]  [address] [place] [address] Road:  [phone]   FAX:  [phone]  Email: [email or web address]      NOTE: This report was transcribed using voice recognition software. Every effort was made to ensure accuracy; however, inadvertent computerized transcription errors may be present.

## 2023-06-19 ENCOUNTER — HOSPITAL ENCOUNTER (OUTPATIENT)
Dept: RADIATION ONCOLOGY | Age: 68
Discharge: HOME | End: 2023-06-19
Payer: OTHER GOVERNMENT

## 2023-06-30 ENCOUNTER — HOSPITAL ENCOUNTER (OUTPATIENT)
Dept: RADIATION ONCOLOGY | Age: 68
Discharge: HOME | End: 2023-06-30
Payer: OTHER GOVERNMENT

## 2023-06-30 PROCEDURE — 77334 RADIATION TREATMENT AID(S): CPT | Performed by: RADIOLOGY

## 2023-06-30 PROCEDURE — 77307 TELETHX ISODOSE PLAN CPLX: CPT | Performed by: RADIOLOGY

## 2023-07-03 ENCOUNTER — APPOINTMENT (OUTPATIENT)
Dept: RADIATION ONCOLOGY | Age: 68
End: 2023-07-03
Payer: OTHER GOVERNMENT

## 2023-07-05 ENCOUNTER — APPOINTMENT (OUTPATIENT)
Dept: RADIATION ONCOLOGY | Age: 68
End: 2023-07-05
Payer: OTHER GOVERNMENT

## 2023-07-05 ENCOUNTER — HOSPITAL ENCOUNTER (OUTPATIENT)
Dept: RADIATION ONCOLOGY | Age: 68
Discharge: HOME | End: 2023-07-05
Payer: OTHER GOVERNMENT

## 2023-07-05 PROCEDURE — 77412 RADIATION TX DELIVERY LVL 3: CPT | Performed by: RADIOLOGY

## 2023-07-05 PROCEDURE — 77417 THER RADIOLOGY PORT IMAGE(S): CPT | Performed by: RADIOLOGY

## 2023-07-06 ENCOUNTER — HOSPITAL ENCOUNTER (OUTPATIENT)
Dept: RADIATION ONCOLOGY | Age: 68
Discharge: HOME | End: 2023-07-06
Payer: OTHER GOVERNMENT

## 2023-07-06 ENCOUNTER — TELEPHONE (OUTPATIENT)
Dept: RADIATION ONCOLOGY | Age: 68
End: 2023-07-06

## 2023-07-06 DIAGNOSIS — C34.90 MALIGNANT NEOPLASM OF LUNG, UNSPECIFIED LATERALITY, UNSPECIFIED PART OF LUNG (HCC): Primary | ICD-10-CM

## 2023-07-06 PROCEDURE — 77412 RADIATION TX DELIVERY LVL 3: CPT | Performed by: RADIOLOGY

## 2023-07-06 NOTE — PATIENT INSTRUCTIONS
Continue daily fractionated radiation therapy as scheduled. Please see weekly OTV note and intial consultation letter in [facility] for clinical details. Анна Khoury. Cristina Oliver MD MS [address]:  [phone]   FAX: [phone] [address]:  [phone]   FAX:    [phone] [address]:  [phone]   FAX:  [phone]  Email: [email or web address]

## 2023-07-06 NOTE — TELEPHONE ENCOUNTER
[facility]  7/6/2023  Ht Readings from Last 1 Encounters:   06/08/23 6' 1\" (1.854 m)     Wt Readings from Last 10 Encounters:   06/08/23 241 lb 9.6 oz (109.6 kg)   06/05/23 241 lb (109.3 kg)   05/22/23 241 lb 6.4 oz (109.5 kg)   05/01/23 245 lb 11.2 oz (111.4 kg)   04/27/23 243 lb 14.4 oz (110.6 kg)   04/10/23 243 lb 12.8 oz (110.6 kg)   04/04/23 244 lb (110.7 kg)   03/30/23 247 lb 12.8 oz (112.4 kg)   03/23/23 243 lb 3.2 oz (110.3 kg)   03/23/23 243 lb (110.2 kg)       Assessment: Briefly met with Petty Barton while in for radiation today. Petty Barton reports his appetite continues to be great. Eating 2 meals per day and many snacks. He is not drinking any ONS. His weight has been stable. He denied problems with taste alterations, mouth sores, N/V/D/C, odynophagia or dysphagia. He reports he is remaining active and enjoys eating. No questions at this time.      Varghese Vaz RD

## 2023-07-06 NOTE — PROGRESS NOTES
DEPARTMENT OF RADIATION ONCOLOGY ON TREATMENT VISIT         7/6/2023      NAME:  Gilmar Webster    YOB: 1955    Diagnosis: lung CA    SUBJECTIVE:   Gilmar Webster has now received fractionated external beam radiation therapy, PCI - ongoing. Past medical, surgical, social and family histories reviewed and updated as indicated. Pain: controlled    ALLERGIES:  Patient has no known allergies. Current Outpatient Medications   Medication Sig Dispense Refill    memantine (NAMENDA) 5 MG tablet Take 1 tablet by mouth daily for 7 days, THEN 1 tablet 2 times daily for 7 days, THEN 2 tablets 2 times daily. 60 tablet 5    lidocaine-prilocaine (EMLA) 2.5-2.5 % cream Apply topically as needed. 5 g 1    ibuprofen (ADVIL;MOTRIN) 800 MG tablet Take 1 tablet by mouth every 6 hours as needed for Pain 20 tablet 0    prochlorperazine (COMPAZINE) 10 MG tablet Take 1 tablet by mouth every 6 hours as needed (for nausea/vomiting) 40 tablet 3    tiotropium (SPIRIVA RESPIMAT) 2.5 MCG/ACT AERS inhaler Inhale 2 puffs into the lungs daily      varenicline (CHANTIX) 0.5 MG tablet Take 1 tablet by mouth daily Follow dosing quidelines      varenicline (CHANTIX) 1 MG tablet Take 1 tablet by mouth in the morning and at bedtime After completing first dosing cycle.       aspirin 81 MG EC tablet Take 1 tablet by mouth daily 30 tablet 3    lisinopril (PRINIVIL;ZESTRIL) 10 MG tablet Take 1 tablet by mouth daily      Misc Natural Products (URINOZINC PO) Take by mouth daily      pantoprazole (PROTONIX) 40 MG tablet Take 1 tablet by mouth daily      clopidogrel (PLAVIX) 75 MG tablet Take 1 tablet by mouth daily 30 tablet 0    albuterol sulfate HFA (VENTOLIN HFA) 108 (90 Base) MCG/ACT inhaler Inhale 2 puffs into the lungs 4 times daily as needed for Wheezing 18 g 5    atorvastatin (LIPITOR) 40 MG tablet Take 1 tablet by mouth daily 30 tablet 3    amLODIPine (NORVASC) 10 MG tablet Take 1 tablet by mouth daily       No

## 2023-07-07 ENCOUNTER — HOSPITAL ENCOUNTER (OUTPATIENT)
Dept: RADIATION ONCOLOGY | Age: 68
Discharge: HOME | End: 2023-07-07
Payer: OTHER GOVERNMENT

## 2023-07-07 PROCEDURE — 77412 RADIATION TX DELIVERY LVL 3: CPT | Performed by: RADIOLOGY

## 2023-07-10 ENCOUNTER — HOSPITAL ENCOUNTER (OUTPATIENT)
Dept: RADIATION ONCOLOGY | Age: 68
Discharge: HOME | End: 2023-07-10
Payer: OTHER GOVERNMENT

## 2023-07-11 ENCOUNTER — HOSPITAL ENCOUNTER (OUTPATIENT)
Dept: RADIATION ONCOLOGY | Age: 68
Discharge: HOME | End: 2023-07-11
Payer: OTHER GOVERNMENT

## 2023-07-11 VITALS
OXYGEN SATURATION: 98 % | DIASTOLIC BLOOD PRESSURE: 91 MMHG | TEMPERATURE: 98.6 F | HEART RATE: 84 BPM | RESPIRATION RATE: 18 BRPM | BODY MASS INDEX: 31.56 KG/M2 | SYSTOLIC BLOOD PRESSURE: 173 MMHG | WEIGHT: 239.2 LBS

## 2023-07-11 DIAGNOSIS — C34.90 MALIGNANT NEOPLASM OF LUNG, UNSPECIFIED LATERALITY, UNSPECIFIED PART OF LUNG (HCC): Primary | ICD-10-CM

## 2023-07-11 PROCEDURE — 77412 RADIATION TX DELIVERY LVL 3: CPT | Performed by: RADIOLOGY

## 2023-07-11 NOTE — PROGRESS NOTES
DEPARTMENT OF RADIATION ONCOLOGY ON TREATMENT VISIT         7/11/2023      NAME:  Cheryl Schulz    YOB: 1955    Diagnosis: PCI    SUBJECTIVE:   Cheryl Schulz has now received fractionated external beam radiation therapy - ongoing. Past medical, surgical, social and family histories reviewed and updated as indicated. Pain: controlled    ALLERGIES:  Patient has no known allergies. Current Outpatient Medications   Medication Sig Dispense Refill    memantine (NAMENDA) 5 MG tablet Take 1 tablet by mouth daily for 7 days, THEN 1 tablet 2 times daily for 7 days, THEN 2 tablets 2 times daily. 60 tablet 5    lidocaine-prilocaine (EMLA) 2.5-2.5 % cream Apply topically as needed. 5 g 1    ibuprofen (ADVIL;MOTRIN) 800 MG tablet Take 1 tablet by mouth every 6 hours as needed for Pain 20 tablet 0    prochlorperazine (COMPAZINE) 10 MG tablet Take 1 tablet by mouth every 6 hours as needed (for nausea/vomiting) 40 tablet 3    tiotropium (SPIRIVA RESPIMAT) 2.5 MCG/ACT AERS inhaler Inhale 2 puffs into the lungs daily      varenicline (CHANTIX) 0.5 MG tablet Take 1 tablet by mouth daily Follow dosing quidelines      varenicline (CHANTIX) 1 MG tablet Take 1 tablet by mouth in the morning and at bedtime After completing first dosing cycle.       aspirin 81 MG EC tablet Take 1 tablet by mouth daily 30 tablet 3    lisinopril (PRINIVIL;ZESTRIL) 10 MG tablet Take 1 tablet by mouth daily      Misc Natural Products (URINOZINC PO) Take by mouth daily      pantoprazole (PROTONIX) 40 MG tablet Take 1 tablet by mouth daily      clopidogrel (PLAVIX) 75 MG tablet Take 1 tablet by mouth daily 30 tablet 0    albuterol sulfate HFA (VENTOLIN HFA) 108 (90 Base) MCG/ACT inhaler Inhale 2 puffs into the lungs 4 times daily as needed for Wheezing 18 g 5    atorvastatin (LIPITOR) 40 MG tablet Take 1 tablet by mouth daily 30 tablet 3    amLODIPine (NORVASC) 10 MG tablet Take 1 tablet by mouth daily       No current

## 2023-07-11 NOTE — PATIENT INSTRUCTIONS
Continue daily fractionated radiation therapy as scheduled. Please see weekly OTV note and intial consultation letter in [facility] for clinical details. Piper Lynn. Tania Rosen MD MS [place]:  [phone]   FAX: [phone]  [facility]:  [phone]   FAX:    [phone] [address]:  [phone]   FAX:  [phone]  Email: [email or web address]

## 2023-07-12 ENCOUNTER — HOSPITAL ENCOUNTER (OUTPATIENT)
Dept: RADIATION ONCOLOGY | Age: 68
Discharge: HOME | End: 2023-07-12
Payer: OTHER GOVERNMENT

## 2023-07-12 PROCEDURE — 77417 THER RADIOLOGY PORT IMAGE(S): CPT | Performed by: RADIOLOGY

## 2023-07-12 PROCEDURE — 77412 RADIATION TX DELIVERY LVL 3: CPT | Performed by: RADIOLOGY

## 2023-07-13 ENCOUNTER — HOSPITAL ENCOUNTER (OUTPATIENT)
Dept: RADIATION ONCOLOGY | Age: 68
Discharge: HOME | End: 2023-07-13
Payer: OTHER GOVERNMENT

## 2023-07-13 PROCEDURE — 77412 RADIATION TX DELIVERY LVL 3: CPT | Performed by: RADIOLOGY

## 2023-07-14 ENCOUNTER — HOSPITAL ENCOUNTER (OUTPATIENT)
Dept: RADIATION ONCOLOGY | Age: 68
Discharge: HOME | End: 2023-07-14
Payer: OTHER GOVERNMENT

## 2023-07-14 PROCEDURE — 77412 RADIATION TX DELIVERY LVL 3: CPT | Performed by: RADIOLOGY

## 2023-07-17 ENCOUNTER — HOSPITAL ENCOUNTER (OUTPATIENT)
Dept: RADIATION ONCOLOGY | Age: 68
Discharge: HOME | End: 2023-07-17
Payer: OTHER GOVERNMENT

## 2023-07-17 PROCEDURE — 77412 RADIATION TX DELIVERY LVL 3: CPT | Performed by: RADIOLOGY

## 2023-07-18 ENCOUNTER — HOSPITAL ENCOUNTER (OUTPATIENT)
Dept: RADIATION ONCOLOGY | Age: 68
Discharge: HOME | End: 2023-07-18
Payer: OTHER GOVERNMENT

## 2023-07-18 VITALS
RESPIRATION RATE: 18 BRPM | SYSTOLIC BLOOD PRESSURE: 147 MMHG | BODY MASS INDEX: 31.4 KG/M2 | WEIGHT: 238 LBS | TEMPERATURE: 100 F | HEART RATE: 80 BPM | DIASTOLIC BLOOD PRESSURE: 83 MMHG | OXYGEN SATURATION: 98 %

## 2023-07-18 PROCEDURE — 77412 RADIATION TX DELIVERY LVL 3: CPT | Performed by: RADIOLOGY

## 2023-07-18 NOTE — PROGRESS NOTES
OTV note 7/18/2023  Last day received 2500cGy/10 fx PCI for SCLC  No c/o  Reports that he is on memantine  FU in place  Mild erythema scalp still has his normal amt of hair    FU in place  Images approved

## 2023-07-18 NOTE — PROGRESS NOTES
Cheryl Schulz  7/18/2023  8:53 AM          Current Outpatient Medications   Medication Sig Dispense Refill    memantine (NAMENDA) 5 MG tablet Take 1 tablet by mouth daily for 7 days, THEN 1 tablet 2 times daily for 7 days, THEN 2 tablets 2 times daily. 60 tablet 5    lidocaine-prilocaine (EMLA) 2.5-2.5 % cream Apply topically as needed. 5 g 1    ibuprofen (ADVIL;MOTRIN) 800 MG tablet Take 1 tablet by mouth every 6 hours as needed for Pain 20 tablet 0    prochlorperazine (COMPAZINE) 10 MG tablet Take 1 tablet by mouth every 6 hours as needed (for nausea/vomiting) 40 tablet 3    tiotropium (SPIRIVA RESPIMAT) 2.5 MCG/ACT AERS inhaler Inhale 2 puffs into the lungs daily      varenicline (CHANTIX) 0.5 MG tablet Take 1 tablet by mouth daily Follow dosing quidelines      varenicline (CHANTIX) 1 MG tablet Take 1 tablet by mouth in the morning and at bedtime After completing first dosing cycle. aspirin 81 MG EC tablet Take 1 tablet by mouth daily 30 tablet 3    lisinopril (PRINIVIL;ZESTRIL) 10 MG tablet Take 1 tablet by mouth daily      Misc Natural Products (URINOZINC PO) Take by mouth daily      pantoprazole (PROTONIX) 40 MG tablet Take 1 tablet by mouth daily      clopidogrel (PLAVIX) 75 MG tablet Take 1 tablet by mouth daily 30 tablet 0    albuterol sulfate HFA (VENTOLIN HFA) 108 (90 Base) MCG/ACT inhaler Inhale 2 puffs into the lungs 4 times daily as needed for Wheezing 18 g 5    atorvastatin (LIPITOR) 40 MG tablet Take 1 tablet by mouth daily 30 tablet 3    amLODIPine (NORVASC) 10 MG tablet Take 1 tablet by mouth daily       No current facility-administered medications for this encounter. This is an up-to-date medication list.    Please take this list to your next care provider, and discard any previous medication lists.

## 2023-08-10 ENCOUNTER — TELEPHONE (OUTPATIENT)
Dept: CASE MANAGEMENT | Age: 68
End: 2023-08-10

## 2023-08-10 NOTE — TELEPHONE ENCOUNTER
Contacted patient regarding his upcoming scans that need scheduled before his clinic appointment with Dr. Lino Beckman on 09/14/2023. Offered him the [facility] scheduling number to call, he would prefer this nurse navigator make the appointments. [facility] scheduling then updated patient. He is scheduled for his chest, abdomen and pelvic CT scans and brain MRI on 09/11/2023 at [address] at 0730, arrival through [facility] at 0700 to register. Informed him he's to be NPO 3 hours prior to his scans, may take his medications with a small amount of water and is not to wear jewelry or clothing with metal on it or with metal buttons, snaps or zippers. Patient stated he does not have a pacemaker, any implantable other than his medi-port and is not on oxygen. Reminded patient of his radiation 1 month appointment on 08/18/2023 at 0900, lab work on 09/06/2023 at 0900 in this clinic, then his appointment with Dr. Lino Beckman on 09/14/2023 at 1100. Patient stated he wrote down all appointment information and verbalized understanding of all details. Informed an updated clinic calendar will be given to him when he comes for his clinic appointment on 08/18/2023. He was appreciative for the call. Jesse Soto  RN, ADN, BSE, OCN  Patient Nurse Navigator

## 2023-08-18 ENCOUNTER — HOSPITAL ENCOUNTER (OUTPATIENT)
Dept: RADIATION ONCOLOGY | Age: 68
Discharge: HOME | End: 2023-08-18

## 2023-08-18 VITALS
HEART RATE: 66 BPM | RESPIRATION RATE: 18 BRPM | TEMPERATURE: 97.7 F | DIASTOLIC BLOOD PRESSURE: 82 MMHG | BODY MASS INDEX: 31.19 KG/M2 | SYSTOLIC BLOOD PRESSURE: 160 MMHG | WEIGHT: 236.4 LBS

## 2023-09-06 ENCOUNTER — HOSPITAL ENCOUNTER (OUTPATIENT)
Dept: INFUSION THERAPY | Age: 68
Discharge: HOME | End: 2023-09-06
Payer: MEDICARE

## 2023-09-06 DIAGNOSIS — C80.1 MALIGNANT SMALL CELL CANCER (HCC): ICD-10-CM

## 2023-09-06 LAB
ALBUMIN SERPL-MCNC: 4 G/DL (ref 3.5–5.2)
ALP SERPL-CCNC: 77 U/L (ref 40–129)
ALT SERPL-CCNC: 15 U/L (ref 0–40)
ANION GAP SERPL CALCULATED.3IONS-SCNC: 13 MMOL/L (ref 7–16)
AST SERPL-CCNC: 27 U/L (ref 0–39)
BASOPHILS # BLD: 0.05 K/UL (ref 0–0.2)
BASOPHILS NFR BLD: 1 % (ref 0–2)
BILIRUB SERPL-MCNC: 0.5 MG/DL (ref 0–1.2)
BUN SERPL-MCNC: 10 MG/DL (ref 6–23)
CALCIUM SERPL-MCNC: 9.2 MG/DL (ref 8.6–10.2)
CHLORIDE SERPL-SCNC: 98 MMOL/L (ref 98–107)
CO2 SERPL-SCNC: 22 MMOL/L (ref 22–29)
CREAT SERPL-MCNC: 1 MG/DL (ref 0.7–1.2)
EOSINOPHIL # BLD: 0.1 K/UL (ref 0.05–0.5)
EOSINOPHILS RELATIVE PERCENT: 2 % (ref 0–6)
ERYTHROCYTE [DISTWIDTH] IN BLOOD BY AUTOMATED COUNT: 13.4 % (ref 11.5–15)
GFR SERPL CREATININE-BSD FRML MDRD: >60 ML/MIN/1.73M2
GLUCOSE SERPL-MCNC: 125 MG/DL (ref 74–99)
HCT VFR BLD AUTO: 40.6 % (ref 37–54)
HGB BLD-MCNC: 14 G/DL (ref 12.5–16.5)
IMM GRANULOCYTES # BLD AUTO: 0.03 K/UL (ref 0–0.58)
IMM GRANULOCYTES NFR BLD: 1 % (ref 0–5)
LYMPHOCYTES NFR BLD: 0.99 K/UL (ref 1.5–4)
LYMPHOCYTES RELATIVE PERCENT: 17 % (ref 20–42)
MCH RBC QN AUTO: 31.4 PG (ref 26–35)
MCHC RBC AUTO-ENTMCNC: 34.5 G/DL (ref 32–34.5)
MCV RBC AUTO: 91 FL (ref 80–99.9)
MONOCYTES NFR BLD: 0.67 K/UL (ref 0.1–0.95)
MONOCYTES NFR BLD: 11 % (ref 2–12)
NEUTROPHILS NFR BLD: 69 % (ref 43–80)
NEUTS SEG NFR BLD: 4.17 K/UL (ref 1.8–7.3)
PLATELET # BLD AUTO: 294 K/UL (ref 130–450)
PMV BLD AUTO: 8.1 FL (ref 7–12)
POTASSIUM SERPL-SCNC: 3.9 MMOL/L (ref 3.5–5)
PROT SERPL-MCNC: 7 G/DL (ref 6.4–8.3)
RBC # BLD AUTO: 4.46 M/UL (ref 3.8–5.8)
SODIUM SERPL-SCNC: 133 MMOL/L (ref 132–146)
WBC OTHER # BLD: 6 K/UL (ref 4.5–11.5)

## 2023-09-06 PROCEDURE — 36415 COLL VENOUS BLD VENIPUNCTURE: CPT

## 2023-09-06 PROCEDURE — 80053 COMPREHEN METABOLIC PANEL: CPT

## 2023-09-06 PROCEDURE — 85025 COMPLETE CBC W/AUTO DIFF WBC: CPT

## 2023-09-11 ENCOUNTER — HOSPITAL ENCOUNTER (OUTPATIENT)
Dept: MRI IMAGING | Age: 68
Discharge: HOME | End: 2023-09-13
Attending: INTERNAL MEDICINE
Payer: OTHER GOVERNMENT

## 2023-09-11 ENCOUNTER — HOSPITAL ENCOUNTER (OUTPATIENT)
Dept: CT IMAGING | Age: 68
Discharge: HOME | End: 2023-09-11
Attending: INTERNAL MEDICINE
Payer: OTHER GOVERNMENT

## 2023-09-11 DIAGNOSIS — C80.1 MALIGNANT SMALL CELL CANCER (HCC): ICD-10-CM

## 2023-09-11 PROCEDURE — 71260 CT THORAX DX C+: CPT

## 2023-09-11 PROCEDURE — 74177 CT ABD & PELVIS W/CONTRAST: CPT

## 2023-09-11 PROCEDURE — A9577 INJ MULTIHANCE: HCPCS | Performed by: RADIOLOGY

## 2023-09-11 PROCEDURE — 6360000004 HC RX CONTRAST MEDICATION: Performed by: RADIOLOGY

## 2023-09-11 PROCEDURE — 70553 MRI BRAIN STEM W/O & W/DYE: CPT

## 2023-09-11 RX ADMIN — GADOBENATE DIMEGLUMINE 20 ML: 529 INJECTION, SOLUTION INTRAVENOUS at 08:38

## 2023-09-11 RX ADMIN — IOPAMIDOL 100 ML: 755 INJECTION, SOLUTION INTRAVENOUS at 07:44

## 2023-09-14 ENCOUNTER — OFFICE VISIT (OUTPATIENT)
Dept: ONCOLOGY | Age: 68
End: 2023-09-14
Payer: MEDICARE

## 2023-09-14 VITALS
SYSTOLIC BLOOD PRESSURE: 164 MMHG | WEIGHT: 225 LBS | BODY MASS INDEX: 29.82 KG/M2 | OXYGEN SATURATION: 98 % | TEMPERATURE: 98 F | HEIGHT: 73 IN | DIASTOLIC BLOOD PRESSURE: 86 MMHG | HEART RATE: 86 BPM

## 2023-09-14 DIAGNOSIS — C34.12 SMALL CELL LUNG CANCER, LEFT UPPER LOBE (HCC): Primary | ICD-10-CM

## 2023-09-14 PROCEDURE — 99213 OFFICE O/P EST LOW 20 MIN: CPT

## 2023-09-14 NOTE — PROGRESS NOTES
[address]  [address] [facility], [facility]   Hematology/Oncology  Consult      Patient Name: Beka Patino  YOB: 1955  PCP: Alexa Zepeda MD   Referring Provider: Beatrice Vargas / [facility] [id number]     Reason for Consultation:   Chief Complaint   Patient presents with    Lung Cancer      Interval history: No new complaints. History of Present Illness:  74years old male presented to ER in August 2022 with chest pain. CT chest revealed left upper lobe collapse and mediastinal lymphadenopathy. Patient underwent bronchoscopy where an endobronchial left upper lobe mass was noted. Biopsy was done. Pathology was positive for minute focus of poorly differentiated carcinoma involving bronchial connective tissue, pulmonary parenchyma was not present. Definitive classification of carcinoma could not be determined. Patient was then lost to follow-up. Saw Dr. Prachi Mackey last week and was referred for further management. Denies recent weight loss, loss of appetite any new pain. Patient is fully functional.  Pulmonary function testing showed 69% FEV1 predicted. Patient underwent bronchoscopy/EBUS on January 17, 2023. Endobronchial lesion was noted in left upper lobe with compression of the lingula and upper segment of lower lobe. Mediastinal lymphadenopathy was not noted. Left hilar lymph node station 11 was biopsied. Biopsy is positive for small cell CA. PET scan showed hypermetabolic left hilar lesion with an SUV of 30 and no distant metastasis. Equivocal mediastinal lymphadenopathy was noted which is probably reactive-SUV of 3. MRI brain negative for metastasis. 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 Not a surgical candidate considering N1 status. ECOG 1. Started cisplatin/etoposide in January 2023. Finished concurrent radiation therapy in April 2023. Finished 4 cycles in May 2023.   PCI Keerthi

## 2023-12-27 ENCOUNTER — TELEPHONE (OUTPATIENT)
Dept: ONCOLOGY | Age: 68
End: 2023-12-27

## 2023-12-27 NOTE — TELEPHONE ENCOUNTER
Talked to Pt wife. Explained that Dr. Jayant Lee wanted scans done before next visit & to please call CS with the number I provided. Reminded her of Pt next lab & OV appts.  Wife stated they would call in the morning to get scans scheduled

## 2024-01-02 RX ORDER — MEMANTINE HYDROCHLORIDE 5 MG/1
TABLET ORAL
Qty: 60 TABLET | Refills: 0 | Status: SHIPPED | OUTPATIENT
Start: 2024-01-02

## 2024-01-08 ENCOUNTER — TELEPHONE (OUTPATIENT)
Dept: VASCULAR SURGERY | Age: 69
End: 2024-01-08

## 2024-01-20 ENCOUNTER — HOSPITAL ENCOUNTER (OUTPATIENT)
Age: 69
Discharge: HOME | End: 2024-01-20
Payer: OTHER GOVERNMENT

## 2024-01-20 DIAGNOSIS — C34.12 SMALL CELL LUNG CANCER, LEFT UPPER LOBE (HCC): ICD-10-CM

## 2024-01-20 LAB
ALBUMIN SERPL-MCNC: 4.2 G/DL (ref 3.5–5.2)
ALP SERPL-CCNC: 83 U/L (ref 40–129)
ALT SERPL-CCNC: 14 U/L (ref 0–40)
ANION GAP SERPL CALCULATED.3IONS-SCNC: 11 MMOL/L (ref 7–16)
AST SERPL-CCNC: 19 U/L (ref 0–39)
BASOPHILS # BLD: 0.05 K/UL (ref 0–0.2)
BASOPHILS NFR BLD: 1 % (ref 0–2)
BILIRUB SERPL-MCNC: 0.5 MG/DL (ref 0–1.2)
BUN SERPL-MCNC: 9 MG/DL (ref 6–23)
CALCIUM SERPL-MCNC: 9.4 MG/DL (ref 8.6–10.2)
CEA SERPL-MCNC: 3.6 NG/ML (ref 0–5.2)
CHLORIDE SERPL-SCNC: 98 MMOL/L (ref 98–107)
CO2 SERPL-SCNC: 25 MMOL/L (ref 22–29)
CREAT SERPL-MCNC: 0.9 MG/DL (ref 0.7–1.2)
EOSINOPHIL # BLD: 0.17 K/UL (ref 0.05–0.5)
EOSINOPHILS RELATIVE PERCENT: 3 % (ref 0–6)
ERYTHROCYTE [DISTWIDTH] IN BLOOD BY AUTOMATED COUNT: 12.8 % (ref 11.5–15)
GFR SERPL CREATININE-BSD FRML MDRD: >60 ML/MIN/1.73M2
GLUCOSE SERPL-MCNC: 83 MG/DL (ref 74–99)
HCT VFR BLD AUTO: 40.2 % (ref 37–54)
HGB BLD-MCNC: 14 G/DL (ref 12.5–16.5)
IMM GRANULOCYTES # BLD AUTO: 0.09 K/UL (ref 0–0.58)
IMM GRANULOCYTES NFR BLD: 1 % (ref 0–5)
LYMPHOCYTES NFR BLD: 1.22 K/UL (ref 1.5–4)
LYMPHOCYTES RELATIVE PERCENT: 19 % (ref 20–42)
MCH RBC QN AUTO: 31.7 PG (ref 26–35)
MCHC RBC AUTO-ENTMCNC: 34.8 G/DL (ref 32–34.5)
MCV RBC AUTO: 91.2 FL (ref 80–99.9)
MONOCYTES NFR BLD: 0.8 K/UL (ref 0.1–0.95)
MONOCYTES NFR BLD: 12 % (ref 2–12)
NEUTROPHILS NFR BLD: 65 % (ref 43–80)
NEUTS SEG NFR BLD: 4.26 K/UL (ref 1.8–7.3)
PLATELET # BLD AUTO: 333 K/UL (ref 130–450)
PMV BLD AUTO: 8.3 FL (ref 7–12)
POTASSIUM SERPL-SCNC: 4.3 MMOL/L (ref 3.5–5)
PROT SERPL-MCNC: 7.2 G/DL (ref 6.4–8.3)
RBC # BLD AUTO: 4.41 M/UL (ref 3.8–5.8)
SODIUM SERPL-SCNC: 134 MMOL/L (ref 132–146)
WBC OTHER # BLD: 6.6 K/UL (ref 4.5–11.5)

## 2024-01-20 PROCEDURE — 85025 COMPLETE CBC W/AUTO DIFF WBC: CPT

## 2024-01-20 PROCEDURE — 36415 COLL VENOUS BLD VENIPUNCTURE: CPT

## 2024-01-20 PROCEDURE — 82378 CARCINOEMBRYONIC ANTIGEN: CPT

## 2024-01-20 PROCEDURE — 80053 COMPREHEN METABOLIC PANEL: CPT

## 2024-01-22 ENCOUNTER — HOSPITAL ENCOUNTER (OUTPATIENT)
Dept: MRI IMAGING | Age: 69
Discharge: HOME | End: 2024-01-24
Attending: INTERNAL MEDICINE
Payer: OTHER GOVERNMENT

## 2024-01-22 ENCOUNTER — HOSPITAL ENCOUNTER (OUTPATIENT)
Dept: CT IMAGING | Age: 69
Discharge: HOME | End: 2024-01-22
Attending: INTERNAL MEDICINE
Payer: OTHER GOVERNMENT

## 2024-01-22 DIAGNOSIS — C34.12 SMALL CELL LUNG CANCER, LEFT UPPER LOBE (HCC): ICD-10-CM

## 2024-01-22 PROCEDURE — 6360000004 HC RX CONTRAST MEDICATION: Performed by: RADIOLOGY

## 2024-01-22 PROCEDURE — 74177 CT ABD & PELVIS W/CONTRAST: CPT

## 2024-01-22 PROCEDURE — 70553 MRI BRAIN STEM W/O & W/DYE: CPT

## 2024-01-22 PROCEDURE — A9577 INJ MULTIHANCE: HCPCS | Performed by: RADIOLOGY

## 2024-01-22 PROCEDURE — 71260 CT THORAX DX C+: CPT

## 2024-01-22 RX ADMIN — IOPAMIDOL 80 ML: 755 INJECTION, SOLUTION INTRAVENOUS at 07:19

## 2024-01-22 RX ADMIN — GADOBENATE DIMEGLUMINE 20 ML: 529 INJECTION, SOLUTION INTRAVENOUS at 08:17

## 2024-02-01 ENCOUNTER — OFFICE VISIT (OUTPATIENT)
Dept: ONCOLOGY | Age: 69
End: 2024-02-01
Payer: OTHER GOVERNMENT

## 2024-02-01 VITALS
BODY MASS INDEX: 29.01 KG/M2 | OXYGEN SATURATION: 100 % | DIASTOLIC BLOOD PRESSURE: 80 MMHG | HEART RATE: 71 BPM | HEIGHT: 73 IN | SYSTOLIC BLOOD PRESSURE: 158 MMHG | WEIGHT: 218.9 LBS | TEMPERATURE: 98.5 F

## 2024-02-01 DIAGNOSIS — C34.12 SMALL CELL LUNG CANCER, LEFT UPPER LOBE (HCC): Primary | ICD-10-CM

## 2024-02-01 PROCEDURE — 99213 OFFICE O/P EST LOW 20 MIN: CPT

## 2024-02-01 NOTE — PROGRESS NOTES
decreased or retracted status post stent placement from 5.5 cm on prior.  Widely patent iliofemoral vessels with patent iliac limbs of the stent graft No acute nonvascular findings however incidental findings of hepatic steatosis, cholelithiasis and small left pleural effusion which is decreased from prior noted.  Moderate prostatomegaly again noted with correlation of PSA values given overall heterogeneous appearance and enlargement.         ASSESSMENT/PLAN :    Henry was seen today for lung cancer, follow-up and discuss labs.    Diagnoses and all orders for this visit:    Small cell lung cancer, left upper lobe (HCC)  -     MRI BRAIN W WO CONTRAST; Future  -     CT CHEST W CONTRAST; Future  -     CT ABDOMEN PELVIS W IV CONTRAST Additional Contrast? 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  Not a surgical candidate considering N1 status. ECOG 1.  Started cisplatin 75 mg/m2/[id number] mg/m2  in January 2023.  Finished concurrent radiation therapy in April 2023.  Finished 4 cycles in May 2023.  PCI in June 2023.    Reviewed scans from last week.  No evidence of recurrence noted on MRI brain, CT chest abdomen pelvis.  Post RT changes noted left lung.  Reviewed labs which are unremarkable.      Return to clinic in 4 months with repeat CT chest abdomen pelvis MRI brain.  Will do scans every 6 months after next visit.    S/p Chemo-Port.        Return in about 4 months (around 6/1/2024).     Tristin Dorsey MD   Electronically signed 2/1/2024 at 8:41 AM

## 2024-05-29 ENCOUNTER — HOSPITAL ENCOUNTER (OUTPATIENT)
Dept: CT IMAGING | Age: 69
Discharge: HOME | End: 2024-05-29
Attending: INTERNAL MEDICINE
Payer: MEDICARE

## 2024-05-29 ENCOUNTER — TELEPHONE (OUTPATIENT)
Dept: INFUSION THERAPY | Age: 69
End: 2024-05-29

## 2024-05-29 ENCOUNTER — HOSPITAL ENCOUNTER (OUTPATIENT)
Age: 69
Discharge: HOME | End: 2024-05-29
Attending: INTERNAL MEDICINE
Payer: MEDICARE

## 2024-05-29 DIAGNOSIS — C34.12 SMALL CELL LUNG CANCER, LEFT UPPER LOBE (HCC): ICD-10-CM

## 2024-05-29 LAB
ALBUMIN SERPL-MCNC: 4.3 G/DL (ref 3.5–5.2)
ALP SERPL-CCNC: 76 U/L (ref 40–129)
ALT SERPL-CCNC: 17 U/L (ref 0–40)
ANION GAP SERPL CALCULATED.3IONS-SCNC: 12 MMOL/L (ref 7–16)
AST SERPL-CCNC: 27 U/L (ref 0–39)
BASOPHILS # BLD: 0.05 K/UL (ref 0–0.2)
BASOPHILS NFR BLD: 1 % (ref 0–2)
BILIRUB SERPL-MCNC: 0.5 MG/DL (ref 0–1.2)
BUN SERPL-MCNC: 12 MG/DL (ref 6–23)
CHLORIDE SERPL-SCNC: 102 MMOL/L (ref 98–107)
CO2 SERPL-SCNC: 25 MMOL/L (ref 22–29)
CREAT SERPL-MCNC: 0.9 MG/DL (ref 0.7–1.2)
EOSINOPHIL # BLD: 0.18 K/UL (ref 0.05–0.5)
EOSINOPHILS RELATIVE PERCENT: 3 % (ref 0–6)
ERYTHROCYTE [DISTWIDTH] IN BLOOD BY AUTOMATED COUNT: 13.6 % (ref 11.5–15)
GFR, ESTIMATED: >90 ML/MIN/1.73M2
GLUCOSE SERPL-MCNC: 102 MG/DL (ref 74–99)
HCT VFR BLD AUTO: 41.6 % (ref 37–54)
HGB BLD-MCNC: 14.7 G/DL (ref 12.5–16.5)
IMM GRANULOCYTES # BLD AUTO: 0.05 K/UL (ref 0–0.58)
IMM GRANULOCYTES NFR BLD: 1 % (ref 0–5)
LYMPHOCYTES NFR BLD: 0.92 K/UL (ref 1.5–4)
LYMPHOCYTES RELATIVE PERCENT: 14 % (ref 20–42)
MCH RBC QN AUTO: 32.1 PG (ref 26–35)
MCHC RBC AUTO-ENTMCNC: 35.3 G/DL (ref 32–34.5)
MCV RBC AUTO: 90.8 FL (ref 80–99.9)
MONOCYTES NFR BLD: 0.56 K/UL (ref 0.1–0.95)
NEUTROPHILS NFR BLD: 74 % (ref 43–80)
NEUTS SEG NFR BLD: 5.02 K/UL (ref 1.8–7.3)
PLATELET # BLD AUTO: 255 K/UL (ref 130–450)
PMV BLD AUTO: 8.2 FL (ref 7–12)
POTASSIUM SERPL-SCNC: 4.6 MMOL/L (ref 3.5–5)
PROT SERPL-MCNC: 7 G/DL (ref 6.4–8.3)
RBC # BLD AUTO: 4.58 M/UL (ref 3.8–5.8)
SODIUM SERPL-SCNC: 139 MMOL/L (ref 132–146)
WBC OTHER # BLD: 6.8 K/UL (ref 4.5–11.5)

## 2024-05-29 PROCEDURE — 74177 CT ABD & PELVIS W/CONTRAST: CPT

## 2024-05-29 PROCEDURE — 80053 COMPREHEN METABOLIC PANEL: CPT

## 2024-05-29 PROCEDURE — 6360000004 HC RX CONTRAST MEDICATION: Performed by: RADIOLOGY

## 2024-05-29 PROCEDURE — 85025 COMPLETE CBC W/AUTO DIFF WBC: CPT

## 2024-05-29 PROCEDURE — 71260 CT THORAX DX C+: CPT

## 2024-05-29 PROCEDURE — 36415 COLL VENOUS BLD VENIPUNCTURE: CPT

## 2024-05-29 RX ADMIN — IOPAMIDOL 100 ML: 755 INJECTION, SOLUTION INTRAVENOUS at 08:25

## 2024-05-29 NOTE — TELEPHONE ENCOUNTER
Patient's VA approval for Medical Oncology  on 24. Called and spoke with Sivan to renew patient's VA authorization. Per Sivan this is typically the patient's responsibility but she will inform the primary care provider to renew the authorization.    Based on patients appointment desk, the patient has NOT been seen during time of no approval. Next appointment is 24, Sivan advised of this.

## 2024-06-12 ENCOUNTER — HOSPITAL ENCOUNTER (OUTPATIENT)
Dept: MRI IMAGING | Age: 69
Discharge: HOME | End: 2024-06-14
Attending: INTERNAL MEDICINE
Payer: OTHER GOVERNMENT

## 2024-06-12 DIAGNOSIS — C34.12 SMALL CELL LUNG CANCER, LEFT UPPER LOBE (HCC): ICD-10-CM

## 2024-06-12 PROCEDURE — A9577 INJ MULTIHANCE: HCPCS | Performed by: RADIOLOGY

## 2024-06-12 PROCEDURE — 6360000004 HC RX CONTRAST MEDICATION: Performed by: RADIOLOGY

## 2024-06-12 PROCEDURE — 70553 MRI BRAIN STEM W/O & W/DYE: CPT

## 2024-06-12 RX ADMIN — GADOBENATE DIMEGLUMINE 20 ML: 529 INJECTION, SOLUTION INTRAVENOUS at 08:24

## 2024-06-20 ENCOUNTER — CLINICAL DOCUMENTATION (OUTPATIENT)
Dept: INFUSION THERAPY | Age: 69
End: 2024-06-20

## 2024-06-20 ENCOUNTER — OFFICE VISIT (OUTPATIENT)
Dept: ONCOLOGY | Age: 69
End: 2024-06-20
Payer: OTHER GOVERNMENT

## 2024-06-20 VITALS
DIASTOLIC BLOOD PRESSURE: 86 MMHG | TEMPERATURE: 98 F | WEIGHT: 216.5 LBS | RESPIRATION RATE: 18 BRPM | HEART RATE: 56 BPM | BODY MASS INDEX: 28.56 KG/M2 | OXYGEN SATURATION: 99 % | SYSTOLIC BLOOD PRESSURE: 158 MMHG

## 2024-06-20 DIAGNOSIS — C34.12 SMALL CELL LUNG CANCER, LEFT UPPER LOBE (HCC): Primary | ICD-10-CM

## 2024-06-20 PROCEDURE — 99213 OFFICE O/P EST LOW 20 MIN: CPT

## 2024-06-20 NOTE — PROGRESS NOTES
[facility]  [address] [place] [address]   Hematology/Oncology  Consult      Patient Name: Henry Marsh  YOB: 1955  PCP: Neri Garcia MD   Referring Provider: [id number] / [facility] [address]     Reason for Consultation:   No chief complaint on file.     Interval history: No new complaints.      History of Present Illness:  67 years old male presented to ER in August 2022 with chest pain.  CT chest revealed left upper lobe collapse and mediastinal lymphadenopathy.  Patient underwent bronchoscopy where an endobronchial left upper lobe mass was noted.  Biopsy was done.  Pathology was positive for minute focus of poorly differentiated carcinoma involving bronchial connective tissue, pulmonary parenchyma was not present.  Definitive classification of carcinoma could not be determined.  Patient was then lost to follow-up.  Saw Dr. Howard last week and was referred for further management.    Denies recent weight loss, loss of appetite any new pain.  Patient is fully functional.  Pulmonary function testing showed 69% FEV1 predicted.       Patient underwent bronchoscopy/EBUS on January 17, 2023.  Endobronchial lesion was noted in left upper lobe with compression of the lingula and upper segment of lower lobe.  Mediastinal lymphadenopathy was not noted.  Left hilar lymph node station 11 was biopsied.  Biopsy is positive for small cell CA.      PET scan showed hypermetabolic left hilar lesion with an SUV of 30 and no distant metastasis.  Equivocal mediastinal lymphadenopathy was noted which is probably reactive-SUV of 3.  MRI brain negative for metastasis.    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  Not a surgical candidate considering N1 status. ECOG 1.     Started cisplatin/etoposide in January 2023.  Finished concurrent radiation therapy in April 2023.  Finished 4 cycles in May 2023.  PCI June 2023.      Diagnostic Data:     Past

## 2024-06-20 NOTE — PROGRESS NOTES
Henry HARTMANN Salma  6/20/2024  Ht Readings from Last 1 Encounters:   02/01/24 1.854 m (6' 1\")     Wt Readings from Last 10 Encounters:   06/20/24 98.2 kg (216 lb 8 oz)   02/01/24 99.3 kg (218 lb 14.4 oz)   09/14/23 102.1 kg (225 lb)   08/18/23 107.2 kg (236 lb 6.4 oz)   07/18/23 108 kg (238 lb)   07/11/23 108.5 kg (239 lb 3.2 oz)   06/08/23 109.6 kg (241 lb 9.6 oz)   06/05/23 109.3 kg (241 lb)   05/22/23 109.5 kg (241 lb 6.4 oz)   05/01/23 111.4 kg (245 lb 11.2 oz)     BMI=28.56    Assessment: Briefly met with Henry while in for OV with Dr. Dorsey. 10.39% weight loss x 1 year. Henry voiced he is active working and not sitting around eating all day, as he did before. He voiced that he feels great and weight loss has been beneficial. Good appetite/intake with no complains of dysphagia, odynophagia, N/V/D/C. Henry voiced appreciation for check in but denied nutritional concerns at this time.     Lynne Segura, RD

## 2024-11-19 ENCOUNTER — TELEPHONE (OUTPATIENT)
Dept: INFUSION THERAPY | Age: 69
End: 2024-11-19

## 2024-11-19 NOTE — TELEPHONE ENCOUNTER
Submitted a prior authorization renewal request to the [place]'s association, community care, due to current prior authorization expires 12/17/2024. Awaiting a determination.

## 2024-11-21 ENCOUNTER — TELEPHONE (OUTPATIENT)
Dept: INFUSION THERAPY | Age: 69
End: 2024-11-21

## 2024-11-21 NOTE — TELEPHONE ENCOUNTER
Magda, from the [place]'s association returned call with regards to renewing patient's prior authorization for Community care. She stated due to patient's diagnosis, this request has moved into a second review. Voiced understanding and await a determination.

## 2024-12-02 ENCOUNTER — HOSPITAL ENCOUNTER (OUTPATIENT)
Age: 69
Discharge: HOME | End: 2024-12-02
Attending: INTERNAL MEDICINE
Payer: OTHER GOVERNMENT

## 2024-12-02 ENCOUNTER — HOSPITAL ENCOUNTER (OUTPATIENT)
Dept: CT IMAGING | Age: 69
Discharge: HOME | End: 2024-12-02
Attending: INTERNAL MEDICINE
Payer: OTHER GOVERNMENT

## 2024-12-02 ENCOUNTER — HOSPITAL ENCOUNTER (OUTPATIENT)
Dept: MRI IMAGING | Age: 69
Discharge: HOME | End: 2024-12-04
Attending: INTERNAL MEDICINE
Payer: OTHER GOVERNMENT

## 2024-12-02 DIAGNOSIS — C34.12 SMALL CELL LUNG CANCER, LEFT UPPER LOBE (HCC): ICD-10-CM

## 2024-12-02 LAB
BUN SERPL-MCNC: 13 MG/DL (ref 6–23)
CREAT SERPL-MCNC: 0.9 MG/DL (ref 0.7–1.2)
GFR, ESTIMATED: >90 ML/MIN/1.73M2

## 2024-12-02 PROCEDURE — 36415 COLL VENOUS BLD VENIPUNCTURE: CPT

## 2024-12-02 PROCEDURE — A9577 INJ MULTIHANCE: HCPCS | Performed by: RADIOLOGY

## 2024-12-02 PROCEDURE — 74177 CT ABD & PELVIS W/CONTRAST: CPT

## 2024-12-02 PROCEDURE — 82565 ASSAY OF CREATININE: CPT

## 2024-12-02 PROCEDURE — 6360000004 HC RX CONTRAST MEDICATION: Performed by: RADIOLOGY

## 2024-12-02 PROCEDURE — 70553 MRI BRAIN STEM W/O & W/DYE: CPT

## 2024-12-02 PROCEDURE — 71260 CT THORAX DX C+: CPT

## 2024-12-02 PROCEDURE — 84520 ASSAY OF UREA NITROGEN: CPT

## 2024-12-02 RX ORDER — IOPAMIDOL 755 MG/ML
75 INJECTION, SOLUTION INTRAVASCULAR
Status: COMPLETED | OUTPATIENT
Start: 2024-12-02 | End: 2024-12-02

## 2024-12-02 RX ADMIN — GADOBENATE DIMEGLUMINE 20 ML: 529 INJECTION, SOLUTION INTRAVENOUS at 07:52

## 2024-12-02 RX ADMIN — IOPAMIDOL 75 ML: 755 INJECTION, SOLUTION INTRAVENOUS at 08:01

## 2024-12-17 ENCOUNTER — TELEPHONE (OUTPATIENT)
Dept: INFUSION THERAPY | Age: 69
End: 2024-12-17

## 2024-12-17 NOTE — TELEPHONE ENCOUNTER
Called Magda, from the [place] prior authorization renewal, no answer. Left a message to please return call to [phone].

## 2024-12-19 ENCOUNTER — OFFICE VISIT (OUTPATIENT)
Dept: ONCOLOGY | Age: 69
End: 2024-12-19
Payer: OTHER GOVERNMENT

## 2024-12-19 VITALS
TEMPERATURE: 98.6 F | WEIGHT: 215.1 LBS | SYSTOLIC BLOOD PRESSURE: 162 MMHG | HEIGHT: 73 IN | DIASTOLIC BLOOD PRESSURE: 93 MMHG | BODY MASS INDEX: 28.51 KG/M2 | OXYGEN SATURATION: 98 % | HEART RATE: 68 BPM

## 2024-12-19 DIAGNOSIS — C34.12 SMALL CELL LUNG CANCER, LEFT UPPER LOBE (HCC): Primary | ICD-10-CM

## 2024-12-19 PROCEDURE — 99213 OFFICE O/P EST LOW 20 MIN: CPT

## 2024-12-19 NOTE — PROGRESS NOTES
aneurysmal infrarenal abdominal aorta status post aorta bi-iliac stent graft with maximum transaxial dimension of the native sac 5.2 cm minimally decreased or retracted status post stent placement from 5.5 cm on prior.  Peripheral calcifications.  Patent celiac and SMA origins along with patent renal arteries with only mild atherosclerotic calcific disease involvement of the proximal renal artery origins CTA PELVIS: Iliofemoral vessels reveal patent iliac limbs of the stent graft with widely patent external iliac arteries to the common femoral arteries without focal severe stenosis, aneurysm or occlusion Nonvascular: Lower Chest: Lung bases reveal small left potentially mildly loculated pleural effusion.  Thin walled cyst versus bullous formation at the right lung base Organs: Liver without focal lesion.  Diffuse low attenuation throughout the liver of hepatic steatosis.  Gallbladder partially contracted with calcified stones in the fundus and proximal body of cholelithiasis without wall thickening pancreas and spleen unremarkable.  Adrenals without nodule. Kidneys without suspicious renal lesion and no hydronephrosis. GI/Bowel: No focal thickening or disproportion dilatation of bowel.  No inflammatory findings. Pelvis: Moderate prostatomegaly with mildly heterogeneous appearance including median lobe enlargement presses upon the adjacent urinary bladder. Urinary bladder without inflammatory wall thickening however Peritoneum/Retroperitoneum: No bulky retroperitoneal adenopathy. No suspicious peritoneal or mesenteric process Bones/Soft Tissues: No acute osseous or soft tissue findings.  Fat containing left direct inguinal hernia     Atherosclerotic aneurysmal infrarenal abdominal aorta status post aorta bi-iliac stent graft with maximum transaxial dimension of the native sac 5.2 cm minimally decreased or retracted status post stent placement from 5.5 cm on prior.  Widely patent iliofemoral vessels with patent iliac

## 2025-05-19 ENCOUNTER — HOSPITAL ENCOUNTER (OUTPATIENT)
Age: 70
Discharge: HOME | End: 2025-05-19
Payer: OTHER GOVERNMENT

## 2025-05-19 LAB
ALBUMIN SERPL-MCNC: 4.4 G/DL (ref 3.5–5.2)
ALP SERPL-CCNC: 83 U/L (ref 40–129)
ALT SERPL-CCNC: 18 U/L (ref 0–40)
ANION GAP SERPL CALCULATED.3IONS-SCNC: 11 MMOL/L (ref 7–16)
AST SERPL-CCNC: 24 U/L (ref 0–39)
BASOPHILS # BLD: 0.07 K/UL (ref 0–0.2)
BASOPHILS NFR BLD: 1 % (ref 0–2)
BILIRUB SERPL-MCNC: 0.3 MG/DL (ref 0–1.2)
BUN SERPL-MCNC: 8 MG/DL (ref 6–23)
CALCIUM SERPL-MCNC: 9.3 MG/DL (ref 8.6–10.2)
CHLORIDE SERPL-SCNC: 101 MMOL/L (ref 98–107)
CO2 SERPL-SCNC: 26 MMOL/L (ref 22–29)
CREAT SERPL-MCNC: 0.8 MG/DL (ref 0.7–1.2)
EOSINOPHIL # BLD: 0.18 K/UL (ref 0.05–0.5)
EOSINOPHILS RELATIVE PERCENT: 3 % (ref 0–6)
ERYTHROCYTE [DISTWIDTH] IN BLOOD BY AUTOMATED COUNT: 14.1 % (ref 11.5–15)
GFR, ESTIMATED: >90 ML/MIN/1.73M2
GLUCOSE SERPL-MCNC: 95 MG/DL (ref 74–99)
HCT VFR BLD AUTO: 41.8 % (ref 37–54)
HGB BLD-MCNC: 14.5 G/DL (ref 12.5–16.5)
IMM GRANULOCYTES # BLD AUTO: 0.03 K/UL (ref 0–0.58)
IMM GRANULOCYTES NFR BLD: 1 % (ref 0–5)
LYMPHOCYTES NFR BLD: 1.26 K/UL (ref 1.5–4)
LYMPHOCYTES RELATIVE PERCENT: 21 % (ref 20–42)
MCH RBC QN AUTO: 31.2 PG (ref 26–35)
MCHC RBC AUTO-ENTMCNC: 34.7 G/DL (ref 32–34.5)
MCV RBC AUTO: 89.9 FL (ref 80–99.9)
MONOCYTES NFR BLD: 0.62 K/UL (ref 0.1–0.95)
MONOCYTES NFR BLD: 11 % (ref 2–12)
NEUTROPHILS NFR BLD: 63 % (ref 43–80)
NEUTS SEG NFR BLD: 3.73 K/UL (ref 1.8–7.3)
PLATELET # BLD AUTO: 246 K/UL (ref 130–450)
PMV BLD AUTO: 8.1 FL (ref 7–12)
POTASSIUM SERPL-SCNC: 3.9 MMOL/L (ref 3.5–5)
PROT SERPL-MCNC: 7.1 G/DL (ref 6.4–8.3)
RBC # BLD AUTO: 4.65 M/UL (ref 3.8–5.8)
SODIUM SERPL-SCNC: 138 MMOL/L (ref 132–146)
WBC OTHER # BLD: 5.9 K/UL (ref 4.5–11.5)

## 2025-05-19 PROCEDURE — 85025 COMPLETE CBC W/AUTO DIFF WBC: CPT

## 2025-05-19 PROCEDURE — 36415 COLL VENOUS BLD VENIPUNCTURE: CPT

## 2025-05-19 PROCEDURE — 80053 COMPREHEN METABOLIC PANEL: CPT

## 2025-06-02 ENCOUNTER — HOSPITAL ENCOUNTER (OUTPATIENT)
Dept: CT IMAGING | Age: 70
Discharge: HOME | End: 2025-06-02
Attending: INTERNAL MEDICINE
Payer: OTHER GOVERNMENT

## 2025-06-02 ENCOUNTER — HOSPITAL ENCOUNTER (OUTPATIENT)
Dept: MRI IMAGING | Age: 70
Discharge: HOME | End: 2025-06-04
Attending: INTERNAL MEDICINE
Payer: OTHER GOVERNMENT

## 2025-06-02 DIAGNOSIS — C34.12 SMALL CELL LUNG CANCER, LEFT UPPER LOBE (HCC): ICD-10-CM

## 2025-06-02 PROCEDURE — 70553 MRI BRAIN STEM W/O & W/DYE: CPT

## 2025-06-02 PROCEDURE — 74177 CT ABD & PELVIS W/CONTRAST: CPT

## 2025-06-02 PROCEDURE — 6360000004 HC RX CONTRAST MEDICATION: Performed by: RADIOLOGY

## 2025-06-02 PROCEDURE — 71260 CT THORAX DX C+: CPT

## 2025-06-02 PROCEDURE — A9577 INJ MULTIHANCE: HCPCS | Performed by: RADIOLOGY

## 2025-06-02 RX ORDER — IOPAMIDOL 755 MG/ML
75 INJECTION, SOLUTION INTRAVASCULAR
Status: COMPLETED | OUTPATIENT
Start: 2025-06-02 | End: 2025-06-02

## 2025-06-02 RX ADMIN — IOPAMIDOL 75 ML: 755 INJECTION, SOLUTION INTRAVENOUS at 07:23

## 2025-06-02 RX ADMIN — GADOBENATE DIMEGLUMINE 20 ML: 529 INJECTION, SOLUTION INTRAVENOUS at 07:55

## 2025-06-03 ENCOUNTER — TELEPHONE (OUTPATIENT)
Dept: INFUSION THERAPY | Age: 70
End: 2025-06-03

## 2025-06-03 NOTE — TELEPHONE ENCOUNTER
Faxed the [employer] forms or [place] [facility] [employer] prior authorization renewal forms to [phone] for up in coming appointment on 6/19/25. Awaiting a faxed renewal determination.

## 2025-06-04 ENCOUNTER — TELEPHONE (OUTPATIENT)
Dept: INFUSION THERAPY | Age: 70
End: 2025-06-04

## 2025-06-04 NOTE — TELEPHONE ENCOUNTER
Called the [place]'s association with regards to the community care prior authorization renewal. Per Raysa, [phone] ext. [phone], this has been received and it is in progress. Voiced understanding and await a determination.

## 2025-06-19 ENCOUNTER — OFFICE VISIT (OUTPATIENT)
Dept: ONCOLOGY | Age: 70
End: 2025-06-19

## 2025-06-19 VITALS
HEIGHT: 73 IN | SYSTOLIC BLOOD PRESSURE: 169 MMHG | TEMPERATURE: 98.9 F | DIASTOLIC BLOOD PRESSURE: 84 MMHG | BODY MASS INDEX: 29.26 KG/M2 | OXYGEN SATURATION: 100 % | WEIGHT: 220.8 LBS | HEART RATE: 66 BPM

## 2025-06-19 DIAGNOSIS — C34.12 SMALL CELL LUNG CANCER, LEFT UPPER LOBE (HCC): Primary | ICD-10-CM

## 2025-06-19 NOTE — PROGRESS NOTES
[facility]  [address] [place] [address]   Hematology/Oncology  Consult      Patient Name: Henry Marsh  YOB: 1955  PCP: Neri Garcia MD   Referring Provider:      Reason for Consultation:   Chief Complaint   Patient presents with    Lung Cancer    Follow-up      Interval history: No new complaints.      History of Present Illness:  69 years old male presented to ER in August 2022 with chest pain.  CT chest revealed left upper lobe collapse and mediastinal lymphadenopathy.  Patient underwent bronchoscopy where an endobronchial left upper lobe mass was noted.  Biopsy was done.  Pathology was positive for minute focus of poorly differentiated carcinoma involving bronchial connective tissue, pulmonary parenchyma was not present.  Definitive classification of carcinoma could not be determined.  Patient was then lost to follow-up.  Saw Dr. Howard last week and was referred for further management.    Denies recent weight loss, loss of appetite any new pain.  Patient is fully functional.  Pulmonary function testing showed 69% FEV1 predicted.       Patient underwent bronchoscopy/EBUS on January 17, 2023.  Endobronchial lesion was noted in left upper lobe with compression of the lingula and upper segment of lower lobe.  Mediastinal lymphadenopathy was not noted.  Left hilar lymph node station 11 was biopsied.  Biopsy is positive for small cell CA.      PET scan showed hypermetabolic left hilar lesion with an SUV of 30 and no distant metastasis.  Equivocal mediastinal lymphadenopathy was noted which is probably reactive-SUV of 3.  MRI brain negative for metastasis.    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  Not a surgical candidate considering N1 status. ECOG 1.     Started cisplatin/etoposide in January 2023.  Finished concurrent radiation therapy in April 2023.  Finished 4 cycles in May 2023.  PCI June 2023.      Diagnostic Data:     Past Medical

## (undated) DEVICE — DISPOSABLE BIOPSY FORCEPS: Brand: DISPOSABLE BIOPSY FORCEPS

## (undated) DEVICE — 5F (1.0MM ID) X 9CM5F (1.0MM ID) REGULAR MICRO-STICK® INTRODUCER SETINTRODUCER SET WITH NITINOL GUIDEWIREWITH NITIN WITH RADIOPAQUE TIPWITH RADIOPAQ: Brand: MICRO-STICK SETMICRO-STICK SET

## (undated) DEVICE — SINGLE USE SUCTION VALVE MAJ-209: Brand: SINGLE USE SUCTION VALVE (STERILE)

## (undated) DEVICE — CONTAINER SPEC 60ML PH 7NEUTRAL BUFF FRMLN RDY TO USE

## (undated) DEVICE — HUMBLES LAPWRAP® (10/CASE): Brand: HUMBLES LAPWRAP®

## (undated) DEVICE — KIT MED IMAG CNTRST AGNT W/ IOPAMIDOL REUSE

## (undated) DEVICE — GOWN,SIRUS,FABRNF,L,20/CS: Brand: MEDLINE

## (undated) DEVICE — ELECTRODE PT RET AD L9FT HI MOIST COND ADH HYDRGEL CORDED

## (undated) DEVICE — FIXED CORE WIRE GUIDE SAFE-T-J, CURVED: Brand: COOK

## (undated) DEVICE — ADAPTER TBNG DIA15MM SWVL FBROPT BRONCHSCP TERM 2 AXIS PEEP

## (undated) DEVICE — GOWN,SIRUS,NONRNF,SETINSLV,XL,20/CS: Brand: MEDLINE

## (undated) DEVICE — DOUBLE  SWIVEL ELBOW 15M - DOUBLE FLIP TOP CAP WITH SEAL - 22M/15F: Brand: DOUBLE  SWIVEL ELBOW 15M - DOUBLE FLIP TOP CAP WITH SEAL - 22M/15F

## (undated) DEVICE — KIT MFLD ISOLATN NACL CNTRST PRT TBNG SPIK W/ PRSS TRNSDUC

## (undated) DEVICE — INTENDED FOR TISSUE SEPARATION, AND OTHER PROCEDURES THAT REQUIRE A SHARP SURGICAL BLADE TO PUNCTURE OR CUT.: Brand: BARD-PARKER ® STAINLESS STEEL BLADES

## (undated) DEVICE — 18 GA N.G. KIT, 10 PACK: Brand: SITE-RITE

## (undated) DEVICE — Device: Brand: SINGLE USE GUIDE SHEATH KIT

## (undated) DEVICE — GLOVE ORANGE PI 7 1/2   MSG9075

## (undated) DEVICE — CLOTH SURG PREP PREOPERATIVE CHLORHEXIDINE GLUC 2% READYPREP

## (undated) DEVICE — GLOVE SURG SZ 65 THK91MIL LTX FREE SYN POLYISOPRENE

## (undated) DEVICE — SINGLE USE BIOPSY VALVE MAJ-210: Brand: SINGLE USE BIOPSY VALVE (STERILE)

## (undated) DEVICE — TOWEL,OR,DSP,ST,BLUE,STD,6/PK,12PK/CS: Brand: MEDLINE

## (undated) DEVICE — AIRWAY PHARYNGEAL AD 9 CM INTUB

## (undated) DEVICE — TUBING ANGIO L48IN POLYUR HI PRSS 1200PSI AIRLESS ROT ADPT

## (undated) DEVICE — KIT HND CTRL 3 W STPCOCK ROT END 54IN PREM HI PRSS TBNG AT

## (undated) DEVICE — SHEATH INTRO 8FR L12CM GWIRE 0.038IN W/ SMOOTH TAPERS TIP

## (undated) DEVICE — GAUZE,SPONGE,4"X4",16PLY,XRAY,STRL,LF: Brand: MEDLINE

## (undated) DEVICE — TFE COATED AMPLATZ ULTRA STIFF WIRE GUIDE - CATHETER EXCHANGE: Brand: AMPLATZ

## (undated) DEVICE — MEDIA CONTRAST ISOVUE 370 100ML

## (undated) DEVICE — BRONCHOSCOPY PACK: Brand: MEDLINE INDUSTRIES, INC.

## (undated) DEVICE — CATHETER VASC DIAG MOD PERIPH W/O HYDRPHLC COAT AD

## (undated) DEVICE — APPLICATOR MEDICATED 26 CC SOLUTION HI LT ORNG CHLORAPREP

## (undated) DEVICE — Device: Brand: BALLOON3

## (undated) DEVICE — NDL CNTR 40CT FM MAG: Brand: MEDLINE INDUSTRIES, INC.

## (undated) DEVICE — BEACON TIP TORCON NB ADVANTAGE CATHETER: Brand: BEACON TIP TORCON NB

## (undated) DEVICE — PACK,LAPAROTOMY,NO GOWNS: Brand: MEDLINE

## (undated) DEVICE — MARKER,SKIN,WI/RULER AND LABELS: Brand: MEDLINE

## (undated) DEVICE — GUIDEWIRE VASC STR 0.035 INX180 CM 15 CM BENT PTFE COAT STRT

## (undated) DEVICE — RADIFOCUS GLIDEWIRE ADVANTAGE GUIDEWIRE: Brand: GLIDEWIRE ADVANTAGE

## (undated) DEVICE — SOLUTION IRRIG 500ML 0.9% SOD CHL USP POUR PLAS BTL

## (undated) DEVICE — COVER,LIGHT HANDLE,FLX,1/PK: Brand: MEDLINE INDUSTRIES, INC.

## (undated) DEVICE — 3M™ STERI-DRAPE™ INCISE DRAPE 1050 (60CM X 45CM): Brand: STERI-DRAPE™

## (undated) DEVICE — NEEDLE HYPO 25GA L1.5IN BLU POLYPR HUB S STL REG BVL STR

## (undated) DEVICE — CATHETER RAD W/O HYDRPHLC COAT SHEP HK 1.0 .046/1.17MM 65CM

## (undated) DEVICE — CATHETER ANGIO AD 5FR L65CM DIA0.046IN GWIRE 0.035IN BERN

## (undated) DEVICE — BENTSON WIRE GUIDE 20CM DISTAL FLEXIBILITY WITH SOFTENED TIP: Brand: BENTSON

## (undated) DEVICE — CANNULA IV 18GA L15IN BLNT FILL LUERLOCK HUB MJCT

## (undated) DEVICE — BLADE ES ELASTOMERIC COAT INSUL DURABLE BEND UPTO 90DEG

## (undated) DEVICE — ENDOVASCULAR: Brand: MEDLINE INDUSTRIES, INC.

## (undated) DEVICE — CATHETER HAD L24CM DIA15.5FR BASIC LNG TERM POLYUR STR

## (undated) DEVICE — LOOP VES W25MM THK1MM MAXI RED SIL FLD REPELLENT 100 PER

## (undated) DEVICE — ADHESIVE SKIN CLSR 0.7ML TOP DERMBND ADV

## (undated) DEVICE — CODA, LP BALLOON CATHETER: Brand: CODA

## (undated) DEVICE — PERCLOSE PROGLIDE™ SUTURE-MEDIATED CLOSURE SYSTEM: Brand: PERCLOSE PROGLIDE™

## (undated) DEVICE — SYRINGE MED 10ML LUERLOCK TIP W/O SFTY DISP

## (undated) DEVICE — 18GA (1.3MM OD: 1.0MM ID) X 7CM INTRODUCER18GA X 7CM NEEDLENEEDLE: Brand: INTRODUCER NEEDLEINTRODUCER NEEDLE

## (undated) DEVICE — SPONGE LAP W18XL18IN WHT COT 4 PLY FLD STRUNG RADPQ DISP ST

## (undated) DEVICE — INTRODUCER HEMSTAS 6FRX5CM SHTH W/ .035IN GWIRE ULTIMUM EV 407649] ST JUDE MED CARDIOVASCULAR DIV]

## (undated) DEVICE — DOUBLE BASIN SET: Brand: MEDLINE INDUSTRIES, INC.

## (undated) DEVICE — DRAPE EQUIP BANDED BG 36X28 IN W/ROUNDED CORNER SNAPKOVER

## (undated) DEVICE — MAJOR VASCULAR: Brand: MEDLINE INDUSTRIES, INC.

## (undated) DEVICE — GLOVE ORANGE PI 8   MSG9080

## (undated) DEVICE — SCANLAN® SUTURE BOOT™ INSTRUMENT JAW COVERS - ORIGINAL YELLOW, MINI PKG (3 PAIR/CARTRIDGE, 1 CARTRIDGE/PKG): Brand: SCANLAN® SUTURE BOOT™ INSTRUMENT JAW COVERS

## (undated) DEVICE — GOWN,SIRUS,FABRNF,XL,20/CS: Brand: MEDLINE

## (undated) DEVICE — SYRINGE MED 10ML TRNSLUC BRL PLUNG BLK MRK POLYPR CTRL

## (undated) DEVICE — ECHOTIP PROCORE, ENDOBRONCHIAL HD ULTRASOUND BIOPSY NEEDLE FOR OLYMPUS SCOPES: Brand: ECHOTIP PROCORE

## (undated) DEVICE — GARMENT,MEDLINE,DVT,INT,CALF,MED, GEN2: Brand: MEDLINE

## (undated) DEVICE — SYSTEM CATH 20GA L1IN OD1.0668-1.143MM ID0.7874-0.8636MM

## (undated) DEVICE — MEDIA CONTRAST RX ISOVUE-300 61% 30ML VIALS